# Patient Record
Sex: MALE | Race: BLACK OR AFRICAN AMERICAN | NOT HISPANIC OR LATINO | Employment: OTHER | URBAN - NONMETROPOLITAN AREA
[De-identification: names, ages, dates, MRNs, and addresses within clinical notes are randomized per-mention and may not be internally consistent; named-entity substitution may affect disease eponyms.]

---

## 2018-02-17 ENCOUNTER — HOSPITAL ENCOUNTER (EMERGENCY)
Facility: HOSPITAL | Age: 68
Discharge: HOME | End: 2018-02-17
Attending: FAMILY MEDICINE | Admitting: FAMILY MEDICINE

## 2018-02-17 ENCOUNTER — APPOINTMENT (OUTPATIENT)
Dept: NUCLEAR MEDICINE | Facility: HOSPITAL | Age: 68
End: 2018-02-17

## 2018-02-17 ENCOUNTER — APPOINTMENT (OUTPATIENT)
Dept: GENERAL RADIOLOGY | Facility: HOSPITAL | Age: 68
End: 2018-02-17

## 2018-02-17 VITALS
SYSTOLIC BLOOD PRESSURE: 148 MMHG | HEIGHT: 78 IN | RESPIRATION RATE: 16 BRPM | BODY MASS INDEX: 30.89 KG/M2 | TEMPERATURE: 97.5 F | OXYGEN SATURATION: 100 % | HEART RATE: 69 BPM | DIASTOLIC BLOOD PRESSURE: 94 MMHG | WEIGHT: 267 LBS

## 2018-02-17 DIAGNOSIS — E86.0 DEHYDRATION: ICD-10-CM

## 2018-02-17 DIAGNOSIS — R55 NEUROCARDIOGENIC SYNCOPE: Primary | ICD-10-CM

## 2018-02-17 LAB
ALBUMIN SERPL-MCNC: 4.5 G/DL (ref 3.4–4.8)
ALBUMIN/GLOB SERPL: 1.8 G/DL (ref 1.5–2.5)
ALP SERPL-CCNC: 73 U/L (ref 40–129)
ALT SERPL W P-5'-P-CCNC: 32 U/L (ref 10–44)
ANION GAP SERPL CALCULATED.3IONS-SCNC: 5.2 MMOL/L (ref 3.6–11.2)
AST SERPL-CCNC: 29 U/L (ref 10–34)
BASOPHILS # BLD AUTO: 0.01 10*3/MM3 (ref 0–0.3)
BASOPHILS NFR BLD AUTO: 0.1 % (ref 0–2)
BILIRUB SERPL-MCNC: 0.5 MG/DL (ref 0.2–1.8)
BNP SERPL-MCNC: 4 PG/ML (ref 0–100)
BUN BLD-MCNC: 26 MG/DL (ref 7–21)
BUN/CREAT SERPL: 19.8 (ref 7–25)
CALCIUM SPEC-SCNC: 9.2 MG/DL (ref 7.7–10)
CHLORIDE SERPL-SCNC: 107 MMOL/L (ref 99–112)
CO2 SERPL-SCNC: 26.8 MMOL/L (ref 24.3–31.9)
CREAT BLD-MCNC: 1.31 MG/DL (ref 0.43–1.29)
D DIMER PPP FEU-MCNC: 5.16 MCGFEU/ML (ref 0–0.5)
DEPRECATED RDW RBC AUTO: 40.5 FL (ref 37–54)
EOSINOPHIL # BLD AUTO: 0.07 10*3/MM3 (ref 0–0.7)
EOSINOPHIL NFR BLD AUTO: 1 % (ref 0–7)
ERYTHROCYTE [DISTWIDTH] IN BLOOD BY AUTOMATED COUNT: 12.8 % (ref 11.5–14.5)
GFR SERPL CREATININE-BSD FRML MDRD: 66 ML/MIN/1.73
GLOBULIN UR ELPH-MCNC: 2.5 GM/DL
GLUCOSE BLD-MCNC: 145 MG/DL (ref 70–110)
GLUCOSE BLDC GLUCOMTR-MCNC: 139 MG/DL (ref 70–130)
HCT VFR BLD AUTO: 38.8 % (ref 42–52)
HGB BLD-MCNC: 12.8 G/DL (ref 14–18)
IMM GRANULOCYTES # BLD: 0.01 10*3/MM3 (ref 0–0.03)
IMM GRANULOCYTES NFR BLD: 0.1 % (ref 0–0.5)
LYMPHOCYTES # BLD AUTO: 0.78 10*3/MM3 (ref 1–3)
LYMPHOCYTES NFR BLD AUTO: 11.2 % (ref 16–46)
MAGNESIUM SERPL-MCNC: 2.3 MG/DL (ref 1.7–2.6)
MCH RBC QN AUTO: 29.3 PG (ref 27–33)
MCHC RBC AUTO-ENTMCNC: 33 G/DL (ref 33–37)
MCV RBC AUTO: 88.8 FL (ref 80–94)
MONOCYTES # BLD AUTO: 0.6 10*3/MM3 (ref 0.1–0.9)
MONOCYTES NFR BLD AUTO: 8.6 % (ref 0–12)
NEUTROPHILS # BLD AUTO: 5.5 10*3/MM3 (ref 1.4–6.5)
NEUTROPHILS NFR BLD AUTO: 79 % (ref 40–75)
OSMOLALITY SERPL CALC.SUM OF ELEC: 284.9 MOSM/KG (ref 273–305)
PHOSPHATE SERPL-MCNC: 3.1 MG/DL (ref 2.7–4.5)
PLATELET # BLD AUTO: 253 10*3/MM3 (ref 130–400)
PMV BLD AUTO: 9.5 FL (ref 6–10)
POTASSIUM BLD-SCNC: 3.7 MMOL/L (ref 3.5–5.3)
PROT SERPL-MCNC: 7 G/DL (ref 6–8)
RBC # BLD AUTO: 4.37 10*6/MM3 (ref 4.7–6.1)
SODIUM BLD-SCNC: 139 MMOL/L (ref 135–153)
TROPONIN I SERPL-MCNC: <0.006 NG/ML
TSH SERPL DL<=0.05 MIU/L-ACNC: 0.49 MIU/ML (ref 0.55–4.78)
WBC NRBC COR # BLD: 6.97 10*3/MM3 (ref 4.5–12.5)

## 2018-02-17 PROCEDURE — 71045 X-RAY EXAM CHEST 1 VIEW: CPT

## 2018-02-17 PROCEDURE — 85025 COMPLETE CBC W/AUTO DIFF WBC: CPT | Performed by: FAMILY MEDICINE

## 2018-02-17 PROCEDURE — 78582 LUNG VENTILAT&PERFUS IMAGING: CPT | Performed by: RADIOLOGY

## 2018-02-17 PROCEDURE — 83735 ASSAY OF MAGNESIUM: CPT | Performed by: FAMILY MEDICINE

## 2018-02-17 PROCEDURE — 78582 LUNG VENTILAT&PERFUS IMAGING: CPT

## 2018-02-17 PROCEDURE — 83880 ASSAY OF NATRIURETIC PEPTIDE: CPT | Performed by: FAMILY MEDICINE

## 2018-02-17 PROCEDURE — 80053 COMPREHEN METABOLIC PANEL: CPT | Performed by: FAMILY MEDICINE

## 2018-02-17 PROCEDURE — 84443 ASSAY THYROID STIM HORMONE: CPT | Performed by: FAMILY MEDICINE

## 2018-02-17 PROCEDURE — 36415 COLL VENOUS BLD VENIPUNCTURE: CPT

## 2018-02-17 PROCEDURE — A9539 TC99M PENTETATE: HCPCS | Performed by: FAMILY MEDICINE

## 2018-02-17 PROCEDURE — 85379 FIBRIN DEGRADATION QUANT: CPT | Performed by: FAMILY MEDICINE

## 2018-02-17 PROCEDURE — 71045 X-RAY EXAM CHEST 1 VIEW: CPT | Performed by: RADIOLOGY

## 2018-02-17 PROCEDURE — 0 TECHNETIUM ALBUMIN AGGREGATED: Performed by: FAMILY MEDICINE

## 2018-02-17 PROCEDURE — 93005 ELECTROCARDIOGRAM TRACING: CPT | Performed by: FAMILY MEDICINE

## 2018-02-17 PROCEDURE — 99285 EMERGENCY DEPT VISIT HI MDM: CPT

## 2018-02-17 PROCEDURE — 0 TECHNETIUM TC 99M PENTETATE KIT: Performed by: FAMILY MEDICINE

## 2018-02-17 PROCEDURE — 84484 ASSAY OF TROPONIN QUANT: CPT | Performed by: FAMILY MEDICINE

## 2018-02-17 PROCEDURE — A9540 TC99M MAA: HCPCS | Performed by: FAMILY MEDICINE

## 2018-02-17 PROCEDURE — 82962 GLUCOSE BLOOD TEST: CPT

## 2018-02-17 PROCEDURE — 84100 ASSAY OF PHOSPHORUS: CPT | Performed by: FAMILY MEDICINE

## 2018-02-17 PROCEDURE — 96374 THER/PROPH/DIAG INJ IV PUSH: CPT

## 2018-02-17 PROCEDURE — 25010000002 ONDANSETRON PER 1 MG: Performed by: FAMILY MEDICINE

## 2018-02-17 PROCEDURE — 96361 HYDRATE IV INFUSION ADD-ON: CPT

## 2018-02-17 PROCEDURE — 96375 TX/PRO/DX INJ NEW DRUG ADDON: CPT

## 2018-02-17 PROCEDURE — 93010 ELECTROCARDIOGRAM REPORT: CPT | Performed by: INTERNAL MEDICINE

## 2018-02-17 RX ORDER — FAMOTIDINE 10 MG/ML
20 INJECTION, SOLUTION INTRAVENOUS ONCE
Status: COMPLETED | OUTPATIENT
Start: 2018-02-17 | End: 2018-02-17

## 2018-02-17 RX ORDER — SODIUM CHLORIDE 0.9 % (FLUSH) 0.9 %
10 SYRINGE (ML) INJECTION AS NEEDED
Status: DISCONTINUED | OUTPATIENT
Start: 2018-02-17 | End: 2018-02-17 | Stop reason: HOSPADM

## 2018-02-17 RX ORDER — OMEPRAZOLE 20 MG/1
20 CAPSULE, DELAYED RELEASE ORAL DAILY
COMMUNITY
End: 2021-08-30

## 2018-02-17 RX ORDER — ONDANSETRON 2 MG/ML
4 INJECTION INTRAMUSCULAR; INTRAVENOUS ONCE
Status: COMPLETED | OUTPATIENT
Start: 2018-02-17 | End: 2018-02-17

## 2018-02-17 RX ORDER — AMLODIPINE, VALSARTAN AND HYDROCHLOROTHIAZIDE 5; 160; 25 MG/1; MG/1; MG/1
TABLET ORAL DAILY
COMMUNITY
End: 2021-08-30 | Stop reason: SDUPTHER

## 2018-02-17 RX ORDER — IBUPROFEN 600 MG/1
600 TABLET ORAL EVERY 6 HOURS PRN
COMMUNITY
End: 2021-08-30

## 2018-02-17 RX ADMIN — FAMOTIDINE 20 MG: 10 INJECTION INTRAVENOUS at 16:23

## 2018-02-17 RX ADMIN — SODIUM CHLORIDE 1000 ML: 9 INJECTION, SOLUTION INTRAVENOUS at 17:14

## 2018-02-17 RX ADMIN — Medication 1 DOSE: at 18:10

## 2018-02-17 RX ADMIN — SODIUM CHLORIDE 2000 ML: 9 INJECTION, SOLUTION INTRAVENOUS at 16:23

## 2018-02-17 RX ADMIN — ONDANSETRON 4 MG: 2 INJECTION, SOLUTION INTRAMUSCULAR; INTRAVENOUS at 16:23

## 2018-02-17 RX ADMIN — KIT FOR THE PREPARATION OF TECHNETIUM TC 99M PENTETATE 1 DOSE: 20 INJECTION, POWDER, LYOPHILIZED, FOR SOLUTION INTRAVENOUS; RESPIRATORY (INHALATION) at 17:43

## 2018-02-17 NOTE — ED NOTES
Went to get pts glucose check. Registration is in there at this time. Will Check back.      Jane Marie  02/17/18 1540

## 2018-02-17 NOTE — ED PROVIDER NOTES
Subjective   History of Present Illness  68 y/o M here after syncopal episode that occurred shortly PTP. Pt states that he was attending a banquet when he suddenly felt nauseous and awoke after the syncopal episode. Pt states he had been standing for >20 minutes taking pictures when he suddenly felt nauseated and lightheaded. Pt states that he sat down upon feeling symptoms and almost immediately had syncopal episode. Pt does not remember syncope per se but states he sat down and the next thing he remembers people were standing over him when he awoke. Pt denies any CP, SOA, numbness/tingling, or palpitations surrounding event. Pt has never had an episode like this before. Pt has no history or arrhythmia or CHF and states he was last seen at his PCP <1 wk prior to presentation. Pt mentions that he is from out of town and has had a prolonged car ride.  Review of Systems   Constitutional: Negative for chills, fatigue and fever.   Eyes: Negative for photophobia and visual disturbance.   Respiratory: Negative for cough, chest tightness, shortness of breath and wheezing.    Cardiovascular: Negative for chest pain, palpitations and leg swelling.   Gastrointestinal: Positive for nausea. Negative for abdominal distention, abdominal pain, constipation, diarrhea and vomiting.   Genitourinary: Negative for difficulty urinating and dysuria.   Musculoskeletal: Negative for back pain and neck pain.   Skin: Negative for color change and pallor.   Neurological: Positive for syncope. Negative for dizziness, seizures, weakness, light-headedness and numbness.   Hematological: Does not bruise/bleed easily.   All other systems reviewed and are negative.      Past Medical History:   Diagnosis Date   • Acid reflux    • Diabetes    • High cholesterol    • Hypertension    • Neuropathy        No Known Allergies    Past Surgical History:   Procedure Laterality Date   • CIRCUMCISION     • KNEE ACL RECONSTRUCTION      left       History  reviewed. No pertinent family history.    Social History     Social History   • Marital status:      Spouse name: N/A   • Number of children: N/A   • Years of education: N/A     Social History Main Topics   • Smoking status: Never Smoker   • Smokeless tobacco: Never Used   • Alcohol use No   • Drug use: No   • Sexual activity: Defer     Other Topics Concern   • None     Social History Narrative   • None           Objective   Physical Exam   Constitutional: He is oriented to person, place, and time. He appears well-developed and well-nourished. He is active.   HENT:   Head: Normocephalic and atraumatic.   Right Ear: Hearing, external ear and ear canal normal.   Left Ear: Hearing, external ear and ear canal normal.   Nose: Nose normal.   Mouth/Throat: Uvula is midline, oropharynx is clear and moist and mucous membranes are normal.   Eyes: Conjunctivae, EOM and lids are normal. Pupils are equal, round, and reactive to light.   Neck: Trachea normal, normal range of motion, full passive range of motion without pain and phonation normal. Neck supple.   Cardiovascular: Normal rate, regular rhythm and normal heart sounds.    Pulmonary/Chest: Effort normal and breath sounds normal.   Abdominal: Normal appearance.   Neurological: He is alert and oriented to person, place, and time. GCS eye subscore is 4. GCS verbal subscore is 5. GCS motor subscore is 6.   Skin: Skin is warm, dry and intact.   Psychiatric: He has a normal mood and affect. His speech is normal and behavior is normal. Cognition and memory are normal.   Nursing note and vitals reviewed.      Procedures         ED Course  ED Course   Value Comment By Time   ECG 12 Lead NSR, 64 bpm. QTc 392ms. No ST segment abnormalities concerning for STEMI. No blocks or dysrhythmias. Naveed Grimaldo MD 02/17 1528   [place] Syncope Score of 0 on presentation placing pt in the low risk category. Pt's WELL's score for PE is zero on presentation but pt has PERC  of 1 necessitating a d-dimer since PE cannot be ruled out.  1907-pt V/Q scan read as low probability for PE. Pt with evidence of dehydration on labs with elevation in his BUN/Cr. Pt given IVF bolus. Pt states that he is feeling much improved. Pt encouraged to maintain PO intake. Given pt has such frequent PCP f/u will continue medications as is for now with plan to revisit his PCP next week. Pt has been asymptomatic while in the ED. I spoke w/ Dr Jeffries who removed pt's EKG and concurred with outpt f/u.             MDM  Number of Diagnoses or Management Options  Dehydration: new and requires workup  Neurocardiogenic syncope: new and requires workup     Amount and/or Complexity of Data Reviewed  Clinical lab tests: reviewed and ordered  Tests in the radiology section of CPT®: reviewed and ordered  Tests in the medicine section of CPT®: reviewed and ordered  Independent visualization of images, tracings, or specimens: yes    Risk of Complications, Morbidity, and/or Mortality  Presenting problems: high  Diagnostic procedures: high  Management options: high    Patient Progress  Patient progress: stable      Final diagnoses:   Neurocardiogenic syncope   Dehydration            Naveed Grimaldo MD  02/17/18 1915       Naveed Grimaldo MD  02/17/18 1921

## 2018-02-18 NOTE — ED NOTES
PT IS AAO X4 PT HAS NO SIGNS OF DISTRESS AT THIS TIME BREATHING IS EQUAL AND UNLABORED SKIN PWD     Kortney Laughlin, ZHANG  02/17/18 1939

## 2019-02-20 ENCOUNTER — HOSPITAL ENCOUNTER (OUTPATIENT)
Dept: OTHER | Facility: HOSPITAL | Age: 69
Discharge: HOME | End: 2019-02-20
Attending: INTERNAL MEDICINE

## 2019-02-20 LAB
ANION GAP SERPL CALC-SCNC: 15 MMOL/L (ref 8–19)
BUN SERPL-MCNC: 18 MG/DL (ref 5–25)
BUN/CREAT SERPL: 15 {RATIO} (ref 6–20)
CALCIUM SERPL-MCNC: 9.3 MG/DL (ref 8.7–10.4)
CHLORIDE SERPL-SCNC: 103 MMOL/L (ref 99–111)
CONV CO2: 27 MMOL/L (ref 22–32)
CREAT UR-MCNC: 1.22 MG/DL (ref 0.7–1.2)
GFR SERPLBLD BASED ON 1.73 SQ M-ARVRAT: >60 ML/MIN/{1.73_M2}
GLUCOSE SERPL-MCNC: 134 MG/DL (ref 70–99)
MAGNESIUM SERPL-MCNC: 2.32 MG/DL (ref 1.6–2.3)
OSMOLALITY SERPL CALC.SUM OF ELEC: 294 MOSM/KG (ref 273–304)
POTASSIUM SERPL-SCNC: 4.5 MMOL/L (ref 3.5–5.3)
SODIUM SERPL-SCNC: 140 MMOL/L (ref 135–147)

## 2019-03-08 ENCOUNTER — HOSPITAL ENCOUNTER (OUTPATIENT)
Dept: GENERAL RADIOLOGY | Facility: HOSPITAL | Age: 69
Discharge: HOME | End: 2019-03-08
Attending: PHYSICIAN ASSISTANT

## 2019-03-26 ENCOUNTER — HOSPITAL ENCOUNTER (OUTPATIENT)
Dept: URGENT CARE | Facility: CLINIC | Age: 69
Discharge: HOME | End: 2019-03-26

## 2019-03-26 LAB — GLUCOSE BLD-MCNC: 163 MG/DL (ref 70–99)

## 2019-04-01 ENCOUNTER — HOSPITAL ENCOUNTER (OUTPATIENT)
Dept: OTHER | Facility: HOSPITAL | Age: 69
Discharge: HOME | End: 2019-04-01
Attending: INTERNAL MEDICINE

## 2019-04-01 LAB
ALBUMIN SERPL-MCNC: 3.7 G/DL (ref 3.5–5)
ALBUMIN/GLOB SERPL: 1.1 {RATIO} (ref 1.4–2.6)
ALP SERPL-CCNC: 88 U/L (ref 56–155)
ALT SERPL-CCNC: 19 U/L (ref 10–40)
ANION GAP SERPL CALC-SCNC: 16 MMOL/L (ref 8–19)
AST SERPL-CCNC: 17 U/L (ref 15–50)
BASOPHILS # BLD AUTO: 0.02 10*3/UL (ref 0–0.2)
BASOPHILS NFR BLD AUTO: 0.3 % (ref 0–3)
BILIRUB SERPL-MCNC: 0.24 MG/DL (ref 0.2–1.3)
BUN SERPL-MCNC: 17 MG/DL (ref 5–25)
BUN/CREAT SERPL: 16 {RATIO} (ref 6–20)
CALCIUM SERPL-MCNC: 9.7 MG/DL (ref 8.7–10.4)
CHLORIDE SERPL-SCNC: 100 MMOL/L (ref 99–111)
CHOLEST SERPL-MCNC: 161 MG/DL (ref 107–200)
CHOLEST/HDLC SERPL: 3 {RATIO} (ref 3–6)
CONV ABS IMM GRAN: 0.02 10*3/UL (ref 0–0.2)
CONV CO2: 28 MMOL/L (ref 22–32)
CONV IMMATURE GRAN: 0.3 % (ref 0–1.8)
CONV TOTAL PROTEIN: 7.1 G/DL (ref 6.3–8.2)
CREAT UR-MCNC: 1.05 MG/DL (ref 0.7–1.2)
DEPRECATED RDW RBC AUTO: 40.3 FL (ref 35.1–43.9)
EOSINOPHIL # BLD AUTO: 0.25 10*3/UL (ref 0–0.7)
EOSINOPHIL # BLD AUTO: 3.2 % (ref 0–7)
ERYTHROCYTE [DISTWIDTH] IN BLOOD BY AUTOMATED COUNT: 12.3 % (ref 11.6–14.4)
EST. AVERAGE GLUCOSE BLD GHB EST-MCNC: 160 MG/DL
GFR SERPLBLD BASED ON 1.73 SQ M-ARVRAT: >60 ML/MIN/{1.73_M2}
GLOBULIN UR ELPH-MCNC: 3.4 G/DL (ref 2–3.5)
GLUCOSE SERPL-MCNC: 173 MG/DL (ref 70–99)
HBA1C MFR BLD: 12.3 G/DL (ref 14–18)
HBA1C MFR BLD: 7.2 % (ref 3.5–5.7)
HCT VFR BLD AUTO: 38.6 % (ref 42–52)
HDLC SERPL-MCNC: 53 MG/DL (ref 40–60)
LDLC SERPL CALC-MCNC: 96 MG/DL (ref 70–100)
LYMPHOCYTES # BLD AUTO: 1.75 10*3/UL (ref 1–5)
MCH RBC QN AUTO: 28.2 PG (ref 27–31)
MCHC RBC AUTO-ENTMCNC: 31.9 G/DL (ref 33–37)
MCV RBC AUTO: 88.5 FL (ref 80–96)
MONOCYTES # BLD AUTO: 0.76 10*3/UL (ref 0.2–1.2)
MONOCYTES NFR BLD AUTO: 9.6 % (ref 3–10)
NEUTROPHILS # BLD AUTO: 5.11 10*3/UL (ref 2–8)
NEUTROPHILS NFR BLD AUTO: 64.5 % (ref 30–85)
NRBC CBCN: 0 % (ref 0–0.7)
OSMOLALITY SERPL CALC.SUM OF ELEC: 294 MOSM/KG (ref 273–304)
PLATELET # BLD AUTO: 383 10*3/UL (ref 130–400)
PMV BLD AUTO: 9.6 FL (ref 9.4–12.4)
POTASSIUM SERPL-SCNC: 4.7 MMOL/L (ref 3.5–5.3)
RBC # BLD AUTO: 4.36 10*6/UL (ref 4.7–6.1)
SODIUM SERPL-SCNC: 139 MMOL/L (ref 135–147)
T4 FREE SERPL-MCNC: 1.2 NG/DL (ref 0.9–1.8)
TRIGL SERPL-MCNC: 61 MG/DL (ref 40–150)
TSH SERPL-ACNC: 2.36 M[IU]/L (ref 0.27–4.2)
VARIANT LYMPHS NFR BLD MANUAL: 22.1 % (ref 20–45)
VLDLC SERPL-MCNC: 12 MG/DL (ref 5–37)
WBC # BLD AUTO: 7.91 10*3/UL (ref 4.8–10.8)

## 2019-05-08 ENCOUNTER — HOSPITAL ENCOUNTER (OUTPATIENT)
Dept: OTHER | Facility: HOSPITAL | Age: 69
Discharge: HOME | End: 2019-05-08
Attending: INTERNAL MEDICINE

## 2019-05-08 LAB
BASOPHILS # BLD AUTO: 0.02 10*3/UL (ref 0–0.2)
BASOPHILS NFR BLD AUTO: 0.2 % (ref 0–3)
CONV ABS IMM GRAN: 0.02 10*3/UL (ref 0–0.2)
CONV IMMATURE GRAN: 0.2 % (ref 0–1.8)
CRP SERPL-MCNC: 97.3 MG/L (ref 0–5)
DEPRECATED RDW RBC AUTO: 42.8 FL (ref 35.1–43.9)
EOSINOPHIL # BLD AUTO: 0.24 10*3/UL (ref 0–0.7)
EOSINOPHIL # BLD AUTO: 2.7 % (ref 0–7)
ERYTHROCYTE [DISTWIDTH] IN BLOOD BY AUTOMATED COUNT: 13.2 % (ref 11.6–14.4)
ERYTHROCYTE [SEDIMENTATION RATE] IN BLOOD: 86 MM/H (ref 0–20)
HBA1C MFR BLD: 10.4 G/DL (ref 14–18)
HCT VFR BLD AUTO: 32.5 % (ref 42–52)
LYMPHOCYTES # BLD AUTO: 1.87 10*3/UL (ref 1–5)
MCH RBC QN AUTO: 28.2 PG (ref 27–31)
MCHC RBC AUTO-ENTMCNC: 32 G/DL (ref 33–37)
MCV RBC AUTO: 88.1 FL (ref 80–96)
MONOCYTES # BLD AUTO: 0.84 10*3/UL (ref 0.2–1.2)
MONOCYTES NFR BLD AUTO: 9.5 % (ref 3–10)
NEUTROPHILS # BLD AUTO: 5.85 10*3/UL (ref 2–8)
NEUTROPHILS NFR BLD AUTO: 66.2 % (ref 30–85)
NRBC CBCN: 0 % (ref 0–0.7)
PLATELET # BLD AUTO: 347 10*3/UL (ref 130–400)
PMV BLD AUTO: 9.4 FL (ref 9.4–12.4)
RBC # BLD AUTO: 3.69 10*6/UL (ref 4.7–6.1)
URATE SERPL-MCNC: 4 MG/DL (ref 3.5–8.5)
VARIANT LYMPHS NFR BLD MANUAL: 21.2 % (ref 20–45)
WBC # BLD AUTO: 8.84 10*3/UL (ref 4.8–10.8)

## 2019-06-21 ENCOUNTER — HOSPITAL ENCOUNTER (OUTPATIENT)
Dept: OTHER | Facility: HOSPITAL | Age: 69
Discharge: HOME | End: 2019-06-21
Attending: INTERNAL MEDICINE

## 2019-06-21 LAB
ANION GAP SERPL CALC-SCNC: 22 MMOL/L (ref 8–19)
BASOPHILS # BLD AUTO: 0.01 10*3/UL (ref 0–0.2)
BASOPHILS NFR BLD AUTO: 0.1 % (ref 0–3)
BUN SERPL-MCNC: 19 MG/DL (ref 5–25)
BUN/CREAT SERPL: 18 {RATIO} (ref 6–20)
CALCIUM SERPL-MCNC: 9.8 MG/DL (ref 8.7–10.4)
CHLORIDE SERPL-SCNC: 101 MMOL/L (ref 99–111)
CONV ABS IMM GRAN: 0.03 10*3/UL (ref 0–0.2)
CONV CO2: 23 MMOL/L (ref 22–32)
CONV IMMATURE GRAN: 0.3 % (ref 0–1.8)
CREAT UR-MCNC: 1.05 MG/DL (ref 0.7–1.2)
DEPRECATED RDW RBC AUTO: 48.2 FL (ref 35.1–43.9)
EOSINOPHIL # BLD AUTO: 0.06 10*3/UL (ref 0–0.7)
EOSINOPHIL # BLD AUTO: 0.7 % (ref 0–7)
ERYTHROCYTE [DISTWIDTH] IN BLOOD BY AUTOMATED COUNT: 14.7 % (ref 11.6–14.4)
EST. AVERAGE GLUCOSE BLD GHB EST-MCNC: 154 MG/DL
FERRITIN SERPL-MCNC: 306 NG/ML (ref 30–300)
FOLATE SERPL-MCNC: >20 NG/ML (ref 4.8–20)
GFR SERPLBLD BASED ON 1.73 SQ M-ARVRAT: >60 ML/MIN/{1.73_M2}
GLUCOSE SERPL-MCNC: 180 MG/DL (ref 70–99)
HBA1C MFR BLD: 11.3 G/DL (ref 14–18)
HBA1C MFR BLD: 7 % (ref 3.5–5.7)
HCT VFR BLD AUTO: 35.6 % (ref 42–52)
IRON SATN MFR SERPL: 17 % (ref 20–55)
IRON SERPL-MCNC: 52 UG/DL (ref 70–180)
LYMPHOCYTES # BLD AUTO: 1.21 10*3/UL (ref 1–5)
MCH RBC QN AUTO: 28.4 PG (ref 27–31)
MCHC RBC AUTO-ENTMCNC: 31.7 G/DL (ref 33–37)
MCV RBC AUTO: 89.4 FL (ref 80–96)
MONOCYTES # BLD AUTO: 0.52 10*3/UL (ref 0.2–1.2)
MONOCYTES NFR BLD AUTO: 5.8 % (ref 3–10)
NEUTROPHILS # BLD AUTO: 7.12 10*3/UL (ref 2–8)
NEUTROPHILS NFR BLD AUTO: 79.6 % (ref 30–85)
NRBC CBCN: 0 % (ref 0–0.7)
OSMOLALITY SERPL CALC.SUM OF ELEC: 299 MOSM/KG (ref 273–304)
PLATELET # BLD AUTO: 357 10*3/UL (ref 130–400)
PMV BLD AUTO: 10 FL (ref 9.4–12.4)
POTASSIUM SERPL-SCNC: 4.6 MMOL/L (ref 3.5–5.3)
RBC # BLD AUTO: 3.98 10*6/UL (ref 4.7–6.1)
SODIUM SERPL-SCNC: 141 MMOL/L (ref 135–147)
TIBC SERPL-MCNC: 315 UG/DL (ref 245–450)
TRANSFERRIN SERPL-MCNC: 220 MG/DL (ref 215–365)
VARIANT LYMPHS NFR BLD MANUAL: 13.5 % (ref 20–45)
VIT B12 SERPL-MCNC: 948 PG/ML (ref 211–911)
WBC # BLD AUTO: 8.95 10*3/UL (ref 4.8–10.8)

## 2019-09-24 ENCOUNTER — HOSPITAL ENCOUNTER (OUTPATIENT)
Dept: OTHER | Facility: HOSPITAL | Age: 69
Discharge: HOME | End: 2019-09-24
Attending: INTERNAL MEDICINE

## 2019-09-24 LAB
ALBUMIN SERPL-MCNC: 4.4 G/DL (ref 3.5–5)
ALBUMIN/GLOB SERPL: 1.8 {RATIO} (ref 1.4–2.6)
ALP SERPL-CCNC: 74 U/L (ref 56–155)
ALT SERPL-CCNC: 20 U/L (ref 10–40)
ANION GAP SERPL CALC-SCNC: 13 MMOL/L (ref 8–19)
AST SERPL-CCNC: 21 U/L (ref 15–50)
BASOPHILS # BLD AUTO: 0.01 10*3/UL (ref 0–0.2)
BASOPHILS NFR BLD AUTO: 0.1 % (ref 0–3)
BILIRUB SERPL-MCNC: 0.21 MG/DL (ref 0.2–1.3)
BUN SERPL-MCNC: 18 MG/DL (ref 5–25)
BUN/CREAT SERPL: 16 {RATIO} (ref 6–20)
CALCIUM SERPL-MCNC: 9.7 MG/DL (ref 8.7–10.4)
CHLORIDE SERPL-SCNC: 106 MMOL/L (ref 99–111)
CHOLEST SERPL-MCNC: 137 MG/DL (ref 107–200)
CHOLEST/HDLC SERPL: 2.4 {RATIO} (ref 3–6)
CONV ABS IMM GRAN: 0.02 10*3/UL (ref 0–0.2)
CONV CO2: 25 MMOL/L (ref 22–32)
CONV IMMATURE GRAN: 0.3 % (ref 0–1.8)
CONV RHEUMATOID FACTOR IGM: 14.6 [IU]/ML (ref 0–14)
CONV TOTAL PROTEIN: 6.8 G/DL (ref 6.3–8.2)
CREAT UR-MCNC: 1.1 MG/DL (ref 0.7–1.2)
CRP SERPL HS-MCNC: 0.19 MG/DL (ref 0–0.5)
DEPRECATED RDW RBC AUTO: 42.4 FL (ref 35.1–43.9)
EOSINOPHIL # BLD AUTO: 0.2 10*3/UL (ref 0–0.7)
EOSINOPHIL # BLD AUTO: 2.9 % (ref 0–7)
ERYTHROCYTE [DISTWIDTH] IN BLOOD BY AUTOMATED COUNT: 13.2 % (ref 11.6–14.4)
ERYTHROCYTE [SEDIMENTATION RATE] IN BLOOD: 9 MM/H (ref 0–20)
EST. AVERAGE GLUCOSE BLD GHB EST-MCNC: 166 MG/DL
FERRITIN SERPL-MCNC: 129 NG/ML (ref 30–300)
GFR SERPLBLD BASED ON 1.73 SQ M-ARVRAT: >60 ML/MIN/{1.73_M2}
GLOBULIN UR ELPH-MCNC: 2.4 G/DL (ref 2–3.5)
GLUCOSE SERPL-MCNC: 133 MG/DL (ref 70–99)
HBA1C MFR BLD: 7.4 % (ref 3.5–5.7)
HCT VFR BLD AUTO: 37.4 % (ref 42–52)
HDLC SERPL-MCNC: 57 MG/DL (ref 40–60)
HGB BLD-MCNC: 12 G/DL (ref 14–18)
IRON SATN MFR SERPL: 16 % (ref 20–55)
IRON SERPL-MCNC: 51 UG/DL (ref 70–180)
LDLC SERPL CALC-MCNC: 65 MG/DL (ref 70–100)
LYMPHOCYTES # BLD AUTO: 1.48 10*3/UL (ref 1–5)
LYMPHOCYTES NFR BLD AUTO: 21.6 % (ref 20–45)
MCH RBC QN AUTO: 28.5 PG (ref 27–31)
MCHC RBC AUTO-ENTMCNC: 32.1 G/DL (ref 33–37)
MCV RBC AUTO: 88.8 FL (ref 80–96)
MONOCYTES # BLD AUTO: 0.61 10*3/UL (ref 0.2–1.2)
MONOCYTES NFR BLD AUTO: 8.9 % (ref 3–10)
NEUTROPHILS # BLD AUTO: 4.52 10*3/UL (ref 2–8)
NEUTROPHILS NFR BLD AUTO: 66.2 % (ref 30–85)
NRBC CBCN: 0 % (ref 0–0.7)
OSMOLALITY SERPL CALC.SUM OF ELEC: 292 MOSM/KG (ref 273–304)
PLATELET # BLD AUTO: 275 10*3/UL (ref 130–400)
PMV BLD AUTO: 9.7 FL (ref 9.4–12.4)
POTASSIUM SERPL-SCNC: 4.5 MMOL/L (ref 3.5–5.3)
PSA SERPL-MCNC: 1.81 NG/ML (ref 0–4)
RBC # BLD AUTO: 4.21 10*6/UL (ref 4.7–6.1)
SODIUM SERPL-SCNC: 139 MMOL/L (ref 135–147)
TIBC SERPL-MCNC: 313 UG/DL (ref 245–450)
TRANSFERRIN SERPL-MCNC: 219 MG/DL (ref 215–365)
TRIGL SERPL-MCNC: 75 MG/DL (ref 40–150)
VLDLC SERPL-MCNC: 15 MG/DL (ref 5–37)
WBC # BLD AUTO: 6.84 10*3/UL (ref 4.8–10.8)

## 2019-12-26 ENCOUNTER — HOSPITAL ENCOUNTER (OUTPATIENT)
Dept: OTHER | Facility: HOSPITAL | Age: 69
Discharge: HOME | End: 2019-12-26
Attending: INTERNAL MEDICINE

## 2019-12-26 LAB
ANION GAP SERPL CALC-SCNC: 17 MMOL/L (ref 8–19)
BUN SERPL-MCNC: 19 MG/DL (ref 5–25)
BUN/CREAT SERPL: 18 {RATIO} (ref 6–20)
CALCIUM SERPL-MCNC: 9.6 MG/DL (ref 8.7–10.4)
CHLORIDE SERPL-SCNC: 104 MMOL/L (ref 99–111)
CONV CO2: 25 MMOL/L (ref 22–32)
CREAT UR-MCNC: 1.08 MG/DL (ref 0.7–1.2)
EST. AVERAGE GLUCOSE BLD GHB EST-MCNC: 151 MG/DL
GFR SERPLBLD BASED ON 1.73 SQ M-ARVRAT: >60 ML/MIN/{1.73_M2}
GLUCOSE SERPL-MCNC: 134 MG/DL (ref 70–99)
HBA1C MFR BLD: 6.9 % (ref 3.5–5.7)
OSMOLALITY SERPL CALC.SUM OF ELEC: 298 MOSM/KG (ref 273–304)
POTASSIUM SERPL-SCNC: 4 MMOL/L (ref 3.5–5.3)
SODIUM SERPL-SCNC: 142 MMOL/L (ref 135–147)

## 2020-04-29 ENCOUNTER — HOSPITAL ENCOUNTER (OUTPATIENT)
Dept: LAB | Facility: HOSPITAL | Age: 70
Discharge: HOME | End: 2020-04-29
Attending: INTERNAL MEDICINE

## 2020-04-29 LAB
ALBUMIN SERPL-MCNC: 4.3 G/DL (ref 3.5–5)
ALBUMIN/GLOB SERPL: 1.5 {RATIO} (ref 1.4–2.6)
ALP SERPL-CCNC: 75 U/L (ref 56–155)
ALT SERPL-CCNC: 16 U/L (ref 10–40)
ANION GAP SERPL CALC-SCNC: 23 MMOL/L (ref 8–19)
AST SERPL-CCNC: 21 U/L (ref 15–50)
BASOPHILS # BLD AUTO: 0.02 10*3/UL (ref 0–0.2)
BASOPHILS NFR BLD AUTO: 0.3 % (ref 0–3)
BILIRUB SERPL-MCNC: 0.42 MG/DL (ref 0.2–1.3)
BUN SERPL-MCNC: 19 MG/DL (ref 5–25)
BUN/CREAT SERPL: 14 {RATIO} (ref 6–20)
CALCIUM SERPL-MCNC: 9.8 MG/DL (ref 8.7–10.4)
CHLORIDE SERPL-SCNC: 106 MMOL/L (ref 99–111)
CHOLEST SERPL-MCNC: 153 MG/DL (ref 107–200)
CHOLEST/HDLC SERPL: 3 {RATIO} (ref 3–6)
CONV ABS IMM GRAN: 0.02 10*3/UL (ref 0–0.2)
CONV CO2: 21 MMOL/L (ref 22–32)
CONV IMMATURE GRAN: 0.3 % (ref 0–1.8)
CONV TOTAL PROTEIN: 7.2 G/DL (ref 6.3–8.2)
CREAT UR-MCNC: 1.39 MG/DL (ref 0.7–1.2)
DEPRECATED RDW RBC AUTO: 42.9 FL (ref 35.1–43.9)
EOSINOPHIL # BLD AUTO: 0.19 10*3/UL (ref 0–0.7)
EOSINOPHIL # BLD AUTO: 2.6 % (ref 0–7)
ERYTHROCYTE [DISTWIDTH] IN BLOOD BY AUTOMATED COUNT: 13.1 % (ref 11.6–14.4)
EST. AVERAGE GLUCOSE BLD GHB EST-MCNC: 169 MG/DL
GFR SERPLBLD BASED ON 1.73 SQ M-ARVRAT: 58 ML/MIN/{1.73_M2}
GLOBULIN UR ELPH-MCNC: 2.9 G/DL (ref 2–3.5)
GLUCOSE SERPL-MCNC: 140 MG/DL (ref 70–99)
HBA1C MFR BLD: 7.5 % (ref 3.5–5.7)
HCT VFR BLD AUTO: 38.5 % (ref 42–52)
HDLC SERPL-MCNC: 51 MG/DL (ref 40–60)
HGB BLD-MCNC: 12.3 G/DL (ref 14–18)
IRON SATN MFR SERPL: 20 % (ref 20–55)
IRON SERPL-MCNC: 67 UG/DL (ref 70–180)
LDLC SERPL CALC-MCNC: 85 MG/DL (ref 70–100)
LYMPHOCYTES # BLD AUTO: 1.52 10*3/UL (ref 1–5)
LYMPHOCYTES NFR BLD AUTO: 20.7 % (ref 20–45)
MCH RBC QN AUTO: 28.4 PG (ref 27–31)
MCHC RBC AUTO-ENTMCNC: 31.9 G/DL (ref 33–37)
MCV RBC AUTO: 88.9 FL (ref 80–96)
MONOCYTES # BLD AUTO: 0.67 10*3/UL (ref 0.2–1.2)
MONOCYTES NFR BLD AUTO: 9.1 % (ref 3–10)
NEUTROPHILS # BLD AUTO: 4.91 10*3/UL (ref 2–8)
NEUTROPHILS NFR BLD AUTO: 67 % (ref 30–85)
NRBC CBCN: 0 % (ref 0–0.7)
OSMOLALITY SERPL CALC.SUM OF ELEC: 307 MOSM/KG (ref 273–304)
PLATELET # BLD AUTO: 280 10*3/UL (ref 130–400)
PMV BLD AUTO: 10 FL (ref 9.4–12.4)
POTASSIUM SERPL-SCNC: 4.4 MMOL/L (ref 3.5–5.3)
RBC # BLD AUTO: 4.33 10*6/UL (ref 4.7–6.1)
SODIUM SERPL-SCNC: 146 MMOL/L (ref 135–147)
TIBC SERPL-MCNC: 330 UG/DL (ref 245–450)
TRANSFERRIN SERPL-MCNC: 231 MG/DL (ref 215–365)
TRIGL SERPL-MCNC: 84 MG/DL (ref 40–150)
VLDLC SERPL-MCNC: 17 MG/DL (ref 5–37)
WBC # BLD AUTO: 7.33 10*3/UL (ref 4.8–10.8)

## 2020-08-03 ENCOUNTER — HOSPITAL ENCOUNTER (OUTPATIENT)
Dept: LAB | Facility: HOSPITAL | Age: 70
Discharge: HOME | End: 2020-08-03
Attending: INTERNAL MEDICINE

## 2020-08-03 LAB
ANION GAP SERPL CALC-SCNC: 18 MMOL/L (ref 8–19)
BASOPHILS # BLD AUTO: 0.02 10*3/UL (ref 0–0.2)
BASOPHILS NFR BLD AUTO: 0.3 % (ref 0–3)
BUN SERPL-MCNC: 16 MG/DL (ref 5–25)
BUN/CREAT SERPL: 13 {RATIO} (ref 6–20)
CALCIUM SERPL-MCNC: 10.2 MG/DL (ref 8.7–10.4)
CHLORIDE SERPL-SCNC: 104 MMOL/L (ref 99–111)
CONV ABS IMM GRAN: 0.01 10*3/UL (ref 0–0.2)
CONV CO2: 23 MMOL/L (ref 22–32)
CONV IMMATURE GRAN: 0.2 % (ref 0–1.8)
CREAT UR-MCNC: 1.24 MG/DL (ref 0.7–1.2)
DEPRECATED RDW RBC AUTO: 41.1 FL (ref 35.1–43.9)
EOSINOPHIL # BLD AUTO: 0.16 10*3/UL (ref 0–0.7)
EOSINOPHIL # BLD AUTO: 2.6 % (ref 0–7)
ERYTHROCYTE [DISTWIDTH] IN BLOOD BY AUTOMATED COUNT: 12.6 % (ref 11.6–14.4)
EST. AVERAGE GLUCOSE BLD GHB EST-MCNC: 151 MG/DL
FERRITIN SERPL-MCNC: 127 NG/ML (ref 30–300)
GFR SERPLBLD BASED ON 1.73 SQ M-ARVRAT: >60 ML/MIN/{1.73_M2}
GLUCOSE SERPL-MCNC: 152 MG/DL (ref 70–99)
HBA1C MFR BLD: 6.9 % (ref 3.5–5.7)
HCT VFR BLD AUTO: 38.5 % (ref 42–52)
HGB BLD-MCNC: 12.6 G/DL (ref 14–18)
IRON SATN MFR SERPL: 18 % (ref 20–55)
IRON SERPL-MCNC: 60 UG/DL (ref 70–180)
LYMPHOCYTES # BLD AUTO: 1.68 10*3/UL (ref 1–5)
LYMPHOCYTES NFR BLD AUTO: 27.4 % (ref 20–45)
MCH RBC QN AUTO: 29.2 PG (ref 27–31)
MCHC RBC AUTO-ENTMCNC: 32.7 G/DL (ref 33–37)
MCV RBC AUTO: 89.3 FL (ref 80–96)
MONOCYTES # BLD AUTO: 0.52 10*3/UL (ref 0.2–1.2)
MONOCYTES NFR BLD AUTO: 8.5 % (ref 3–10)
NEUTROPHILS # BLD AUTO: 3.74 10*3/UL (ref 2–8)
NEUTROPHILS NFR BLD AUTO: 61 % (ref 30–85)
NRBC CBCN: 0 % (ref 0–0.7)
OSMOLALITY SERPL CALC.SUM OF ELEC: 296 MOSM/KG (ref 273–304)
PLATELET # BLD AUTO: 261 10*3/UL (ref 130–400)
PMV BLD AUTO: 9.9 FL (ref 9.4–12.4)
POTASSIUM SERPL-SCNC: 4.2 MMOL/L (ref 3.5–5.3)
RBC # BLD AUTO: 4.31 10*6/UL (ref 4.7–6.1)
SODIUM SERPL-SCNC: 141 MMOL/L (ref 135–147)
TIBC SERPL-MCNC: 325 UG/DL (ref 245–450)
TRANSFERRIN SERPL-MCNC: 227 MG/DL (ref 215–365)
WBC # BLD AUTO: 6.13 10*3/UL (ref 4.8–10.8)

## 2020-12-03 ENCOUNTER — HOSPITAL ENCOUNTER (OUTPATIENT)
Dept: LAB | Facility: HOSPITAL | Age: 70
Discharge: HOME | End: 2020-12-03
Attending: INTERNAL MEDICINE

## 2020-12-03 LAB
ALBUMIN SERPL-MCNC: 4.1 G/DL (ref 3.5–5)
ALBUMIN/GLOB SERPL: 1.5 {RATIO} (ref 1.4–2.6)
ALP SERPL-CCNC: 81 U/L (ref 56–155)
ALT SERPL-CCNC: 14 U/L (ref 10–40)
ANION GAP SERPL CALC-SCNC: 15 MMOL/L (ref 8–19)
AST SERPL-CCNC: 19 U/L (ref 15–50)
BASOPHILS # BLD AUTO: 0.02 10*3/UL (ref 0–0.2)
BASOPHILS NFR BLD AUTO: 0.3 % (ref 0–3)
BILIRUB SERPL-MCNC: 0.33 MG/DL (ref 0.2–1.3)
BUN SERPL-MCNC: 21 MG/DL (ref 5–25)
BUN/CREAT SERPL: 19 {RATIO} (ref 6–20)
CALCIUM SERPL-MCNC: 9.9 MG/DL (ref 8.7–10.4)
CHLORIDE SERPL-SCNC: 104 MMOL/L (ref 99–111)
CHOLEST SERPL-MCNC: 147 MG/DL (ref 107–200)
CHOLEST/HDLC SERPL: 2.9 {RATIO} (ref 3–6)
CONV ABS IMM GRAN: 0.01 10*3/UL (ref 0–0.2)
CONV CO2: 27 MMOL/L (ref 22–32)
CONV IMMATURE GRAN: 0.2 % (ref 0–1.8)
CONV TOTAL PROTEIN: 6.9 G/DL (ref 6.3–8.2)
CREAT UR-MCNC: 1.12 MG/DL (ref 0.7–1.2)
DEPRECATED RDW RBC AUTO: 41.1 FL (ref 35.1–43.9)
EOSINOPHIL # BLD AUTO: 0.2 10*3/UL (ref 0–0.7)
EOSINOPHIL # BLD AUTO: 3 % (ref 0–7)
ERYTHROCYTE [DISTWIDTH] IN BLOOD BY AUTOMATED COUNT: 12.6 % (ref 11.6–14.4)
EST. AVERAGE GLUCOSE BLD GHB EST-MCNC: 151 MG/DL
FERRITIN SERPL-MCNC: 162 NG/ML (ref 30–300)
GFR SERPLBLD BASED ON 1.73 SQ M-ARVRAT: >60 ML/MIN/{1.73_M2}
GLOBULIN UR ELPH-MCNC: 2.8 G/DL (ref 2–3.5)
GLUCOSE SERPL-MCNC: 133 MG/DL (ref 70–99)
HBA1C MFR BLD: 6.9 % (ref 3.5–5.7)
HCT VFR BLD AUTO: 40.4 % (ref 42–52)
HDLC SERPL-MCNC: 50 MG/DL (ref 40–60)
HGB BLD-MCNC: 13.2 G/DL (ref 14–18)
IRON SATN MFR SERPL: 26 % (ref 20–55)
IRON SERPL-MCNC: 89 UG/DL (ref 70–180)
LDLC SERPL CALC-MCNC: 82 MG/DL (ref 70–100)
LYMPHOCYTES # BLD AUTO: 2.35 10*3/UL (ref 1–5)
LYMPHOCYTES NFR BLD AUTO: 35.8 % (ref 20–45)
MCH RBC QN AUTO: 29.1 PG (ref 27–31)
MCHC RBC AUTO-ENTMCNC: 32.7 G/DL (ref 33–37)
MCV RBC AUTO: 89.2 FL (ref 80–96)
MONOCYTES # BLD AUTO: 0.59 10*3/UL (ref 0.2–1.2)
MONOCYTES NFR BLD AUTO: 9 % (ref 3–10)
NEUTROPHILS # BLD AUTO: 3.39 10*3/UL (ref 2–8)
NEUTROPHILS NFR BLD AUTO: 51.7 % (ref 30–85)
NRBC CBCN: 0 % (ref 0–0.7)
OSMOLALITY SERPL CALC.SUM OF ELEC: 297 MOSM/KG (ref 273–304)
PLATELET # BLD AUTO: 287 10*3/UL (ref 130–400)
PMV BLD AUTO: 9.7 FL (ref 9.4–12.4)
POTASSIUM SERPL-SCNC: 4.6 MMOL/L (ref 3.5–5.3)
RBC # BLD AUTO: 4.53 10*6/UL (ref 4.7–6.1)
SODIUM SERPL-SCNC: 141 MMOL/L (ref 135–147)
TIBC SERPL-MCNC: 342 UG/DL (ref 245–450)
TRANSFERRIN SERPL-MCNC: 239 MG/DL (ref 215–365)
TRIGL SERPL-MCNC: 77 MG/DL (ref 40–150)
VLDLC SERPL-MCNC: 15 MG/DL (ref 5–37)
WBC # BLD AUTO: 6.56 10*3/UL (ref 4.8–10.8)

## 2021-04-02 ENCOUNTER — HOSPITAL ENCOUNTER (OUTPATIENT)
Dept: LAB | Facility: HOSPITAL | Age: 71
Discharge: HOME | End: 2021-04-02
Attending: INTERNAL MEDICINE

## 2021-04-02 LAB
ANION GAP SERPL CALC-SCNC: 17 MMOL/L (ref 8–19)
BASOPHILS # BLD AUTO: 0.03 10*3/UL (ref 0–0.2)
BASOPHILS NFR BLD AUTO: 0.5 % (ref 0–3)
BUN SERPL-MCNC: 15 MG/DL (ref 5–25)
BUN/CREAT SERPL: 13 {RATIO} (ref 6–20)
CALCIUM SERPL-MCNC: 9.7 MG/DL (ref 8.7–10.4)
CHLORIDE SERPL-SCNC: 106 MMOL/L (ref 99–111)
CONV ABS IMM GRAN: 0.01 10*3/UL (ref 0–0.2)
CONV CO2: 24 MMOL/L (ref 22–32)
CONV IMMATURE GRAN: 0.2 % (ref 0–1.8)
CREAT UR-MCNC: 1.16 MG/DL (ref 0.7–1.2)
DEPRECATED RDW RBC AUTO: 40.8 FL (ref 35.1–43.9)
EOSINOPHIL # BLD AUTO: 0.3 10*3/UL (ref 0–0.7)
EOSINOPHIL # BLD AUTO: 5 % (ref 0–7)
ERYTHROCYTE [DISTWIDTH] IN BLOOD BY AUTOMATED COUNT: 12.5 % (ref 11.6–14.4)
EST. AVERAGE GLUCOSE BLD GHB EST-MCNC: 157 MG/DL
GFR SERPLBLD BASED ON 1.73 SQ M-ARVRAT: >60 ML/MIN/{1.73_M2}
GLUCOSE SERPL-MCNC: 148 MG/DL (ref 70–99)
HBA1C MFR BLD: 7.1 % (ref 3.5–5.7)
HCT VFR BLD AUTO: 40 % (ref 42–52)
HGB BLD-MCNC: 13 G/DL (ref 14–18)
IRON SERPL-MCNC: 61 UG/DL (ref 70–180)
LYMPHOCYTES # BLD AUTO: 2.02 10*3/UL (ref 1–5)
LYMPHOCYTES NFR BLD AUTO: 33.4 % (ref 20–45)
MCH RBC QN AUTO: 29.1 PG (ref 27–31)
MCHC RBC AUTO-ENTMCNC: 32.5 G/DL (ref 33–37)
MCV RBC AUTO: 89.5 FL (ref 80–96)
MONOCYTES # BLD AUTO: 0.47 10*3/UL (ref 0.2–1.2)
MONOCYTES NFR BLD AUTO: 7.8 % (ref 3–10)
NEUTROPHILS # BLD AUTO: 3.21 10*3/UL (ref 2–8)
NEUTROPHILS NFR BLD AUTO: 53.1 % (ref 30–85)
NRBC CBCN: 0 % (ref 0–0.7)
OSMOLALITY SERPL CALC.SUM OF ELEC: 298 MOSM/KG (ref 273–304)
PLATELET # BLD AUTO: 293 10*3/UL (ref 130–400)
PMV BLD AUTO: 10 FL (ref 9.4–12.4)
POTASSIUM SERPL-SCNC: 5 MMOL/L (ref 3.5–5.3)
PSA SERPL-MCNC: 1.81 NG/ML (ref 0–4)
RBC # BLD AUTO: 4.47 10*6/UL (ref 4.7–6.1)
SODIUM SERPL-SCNC: 142 MMOL/L (ref 135–147)
T4 FREE SERPL-MCNC: 1 NG/DL (ref 0.9–1.8)
TSH SERPL-ACNC: 1.33 M[IU]/L (ref 0.27–4.2)
WBC # BLD AUTO: 6.04 10*3/UL (ref 4.8–10.8)

## 2021-07-02 ENCOUNTER — TRANSCRIBE ORDERS (OUTPATIENT)
Dept: ADMINISTRATIVE | Facility: HOSPITAL | Age: 71
End: 2021-07-02

## 2021-07-02 ENCOUNTER — HOSPITAL ENCOUNTER (OUTPATIENT)
Dept: GENERAL RADIOLOGY | Facility: HOSPITAL | Age: 71
Discharge: HOME | End: 2021-07-02
Admitting: INTERNAL MEDICINE

## 2021-07-02 DIAGNOSIS — M25.532 LEFT WRIST PAIN: ICD-10-CM

## 2021-07-02 DIAGNOSIS — M25.562 LEFT KNEE PAIN, UNSPECIFIED CHRONICITY: Primary | ICD-10-CM

## 2021-07-02 DIAGNOSIS — M25.562 LEFT KNEE PAIN, UNSPECIFIED CHRONICITY: ICD-10-CM

## 2021-07-02 PROCEDURE — 73110 X-RAY EXAM OF WRIST: CPT

## 2021-07-02 PROCEDURE — 73562 X-RAY EXAM OF KNEE 3: CPT

## 2021-07-29 PROBLEM — E78.2 MIXED HYPERLIPIDEMIA: Status: ACTIVE | Noted: 2017-02-09

## 2021-07-29 PROBLEM — E78.00 PURE HYPERCHOLESTEROLEMIA: Status: ACTIVE | Noted: 2021-07-29

## 2021-07-29 PROBLEM — D50.9 IRON DEFICIENCY ANEMIA: Status: ACTIVE | Noted: 2021-07-29

## 2021-07-29 PROBLEM — R53.83 FATIGUE: Status: ACTIVE | Noted: 2021-07-29

## 2021-07-29 PROBLEM — Z12.5 SCREENING FOR MALIGNANT NEOPLASM OF PROSTATE: Status: ACTIVE | Noted: 2021-07-29

## 2021-08-19 PROBLEM — E11.9 TYPE 2 DIABETES MELLITUS WITHOUT COMPLICATION, WITHOUT LONG-TERM CURRENT USE OF INSULIN: Status: ACTIVE | Noted: 2021-08-19

## 2021-08-19 PROBLEM — M15.0 PRIMARY OSTEOARTHRITIS INVOLVING MULTIPLE JOINTS: Status: ACTIVE | Noted: 2021-08-19

## 2021-08-19 PROBLEM — D50.9 IRON DEFICIENCY ANEMIA: Status: ACTIVE | Noted: 2021-08-19

## 2021-08-19 PROBLEM — M15.9 PRIMARY OSTEOARTHRITIS INVOLVING MULTIPLE JOINTS: Status: ACTIVE | Noted: 2021-08-19

## 2021-08-25 ENCOUNTER — TRANSCRIBE ORDERS (OUTPATIENT)
Dept: LAB | Facility: HOSPITAL | Age: 71
End: 2021-08-25

## 2021-08-25 ENCOUNTER — LAB (OUTPATIENT)
Dept: LAB | Facility: HOSPITAL | Age: 71
End: 2021-08-25

## 2021-08-25 DIAGNOSIS — E78.2 MIXED HYPERLIPIDEMIA: ICD-10-CM

## 2021-08-25 DIAGNOSIS — E11.9 TYPE 2 DIABETES MELLITUS WITHOUT COMPLICATION, WITHOUT LONG-TERM CURRENT USE OF INSULIN (HCC): Primary | ICD-10-CM

## 2021-08-25 DIAGNOSIS — E11.9 TYPE 2 DIABETES MELLITUS WITHOUT COMPLICATION, WITHOUT LONG-TERM CURRENT USE OF INSULIN (HCC): ICD-10-CM

## 2021-08-25 LAB
ALBUMIN SERPL-MCNC: 4.4 G/DL (ref 3.5–5.2)
ALBUMIN/GLOB SERPL: 1.8 G/DL
ALP SERPL-CCNC: 77 U/L (ref 39–117)
ALT SERPL W P-5'-P-CCNC: 14 U/L (ref 1–41)
ANION GAP SERPL CALCULATED.3IONS-SCNC: 10.4 MMOL/L (ref 5–15)
AST SERPL-CCNC: 18 U/L (ref 1–40)
BILIRUB SERPL-MCNC: 0.3 MG/DL (ref 0–1.2)
BUN SERPL-MCNC: 16 MG/DL (ref 8–23)
BUN/CREAT SERPL: 12.9 (ref 7–25)
CALCIUM SPEC-SCNC: 9.4 MG/DL (ref 8.6–10.5)
CHLORIDE SERPL-SCNC: 105 MMOL/L (ref 98–107)
CHOLEST SERPL-MCNC: 149 MG/DL (ref 0–200)
CO2 SERPL-SCNC: 24.6 MMOL/L (ref 22–29)
CREAT SERPL-MCNC: 1.24 MG/DL (ref 0.76–1.27)
GFR SERPL CREATININE-BSD FRML MDRD: 69 ML/MIN/1.73
GLOBULIN UR ELPH-MCNC: 2.5 GM/DL
GLUCOSE SERPL-MCNC: 132 MG/DL (ref 65–99)
HBA1C MFR BLD: 7.2 % (ref 4.8–5.6)
HDLC SERPL-MCNC: 50 MG/DL (ref 40–60)
LDLC SERPL CALC-MCNC: 86 MG/DL (ref 0–100)
LDLC/HDLC SERPL: 1.72 {RATIO}
POTASSIUM SERPL-SCNC: 4.3 MMOL/L (ref 3.5–5.2)
PROT SERPL-MCNC: 6.9 G/DL (ref 6–8.5)
SODIUM SERPL-SCNC: 140 MMOL/L (ref 136–145)
TRIGL SERPL-MCNC: 65 MG/DL (ref 0–150)
VLDLC SERPL-MCNC: 13 MG/DL (ref 5–40)

## 2021-08-25 PROCEDURE — 36415 COLL VENOUS BLD VENIPUNCTURE: CPT

## 2021-08-25 PROCEDURE — 80061 LIPID PANEL: CPT

## 2021-08-25 PROCEDURE — 80053 COMPREHEN METABOLIC PANEL: CPT

## 2021-08-25 PROCEDURE — 83036 HEMOGLOBIN GLYCOSYLATED A1C: CPT

## 2021-08-28 PROBLEM — D50.8 IRON DEFICIENCY ANEMIA SECONDARY TO INADEQUATE DIETARY IRON INTAKE: Status: ACTIVE | Noted: 2021-08-28

## 2021-08-28 PROBLEM — D64.9 ABSOLUTE ANEMIA: Status: ACTIVE | Noted: 2021-08-28

## 2021-08-28 NOTE — PROGRESS NOTES
"Chief Complaint  Hypertension and Follow-up (Pt gets SOB with activity, especially in the sun. He was wondering if he could have blockage)    Subjective          Marcio Enrique presents to [facility]     History of Present Illness     68-year-old male with underlying hypertension, hyperlipidemia, diabetes, who is coming in for routine 4-month follow-up.  We will review his labs and evaluate any new concerns.    Review of Systems   Constitutional: Negative for appetite change, fatigue and fever.   HENT: Negative for congestion and ear pain.    Eyes: Negative for blurred vision.   Respiratory: Negative for cough, chest tightness, shortness of breath and wheezing.    Cardiovascular: Negative for chest pain, palpitations and leg swelling.   Gastrointestinal: Negative for abdominal pain.   Genitourinary: Negative for difficulty urinating, dysuria and hematuria.   Musculoskeletal: Negative for arthralgias and gait problem.   Skin: Negative for skin lesions.   Neurological: Negative for syncope, memory problem and confusion.   Psychiatric/Behavioral: Negative for self-injury and depressed mood.       Objective   Vital Signs:   /94   Pulse 59   Temp 97.3 °F (36.3 °C)   Ht 195.6 cm (77\")   Wt 123 kg (270 lb 9.6 oz)   SpO2 99%   BMI 32.09 kg/m²           Physical Exam  Vitals and nursing note reviewed.   Constitutional:       General: He is not in acute distress.     Appearance: Normal appearance. He is not toxic-appearing.   HENT:      Head: Atraumatic.      Right Ear: External ear normal.      Left Ear: External ear normal.      Nose: Nose normal.      Mouth/Throat:      Mouth: Mucous membranes are moist.   Eyes:      General:         Right eye: No discharge.         Left eye: No discharge.      Extraocular Movements: Extraocular movements intact.      Pupils: Pupils are equal, round, and reactive to light.   Cardiovascular:      Rate and Rhythm: Normal rate and regular rhythm. "      Pulses: Normal pulses.      Heart sounds: Normal heart sounds. No murmur heard.   No gallop.    Pulmonary:      Effort: Pulmonary effort is normal. No respiratory distress.      Breath sounds: No wheezing, rhonchi or rales.   Abdominal:      General: There is no distension.      Palpations: Abdomen is soft. There is no mass.      Tenderness: There is no abdominal tenderness. There is no guarding.   Musculoskeletal:         General: No swelling or tenderness.      Cervical back: No tenderness.      Right lower leg: No edema.      Left lower leg: No edema.   Skin:     General: Skin is warm and dry.      Findings: No rash.   Neurological:      General: No focal deficit present.      Mental Status: He is alert and oriented to person, place, and time. Mental status is at baseline.      Motor: No weakness.      Gait: Gait normal.   Psychiatric:         Mood and Affect: Mood normal.         Thought Content: Thought content normal.          Result Review :   The following data was reviewed by: Aleksandr Olmstead MD on 08/23/2021:                  Assessment and Plan    Diagnoses and all orders for this visit:    1. Type 2 diabetes mellitus without complication, without long-term current use of insulin (CMS/[facility]) (Primary)  Overview:  A1c is very stable at 7.2 as of his 8/30/2021 office visit.  This is on low-dose Metformin only.  Continue same treatment.    Orders:  -     Hemoglobin A1c; Future  -     Basic Metabolic Panel; Future    2. Primary osteoarthritis involving multiple joints  Overview:  This is improved as of 8/21 office visit.  He is not requiring any regularly scheduled anti-inflammatories because he has been seeing Dr. Mcgovern and getting steroid injections in his left knee.  He has had 3 as of this dictation.      3. Mixed hyperlipidemia  Overview:  LDL of 86 is at goal as of his August 2021 appointment.  He is maintained on low-dose pravastatin, he is tolerating this, stable on this, so continue same.      4.  Gastro-esophageal reflux disease without esophagitis  Overview:  His issues with reflux and specifically burping are not much improved as of August 2021.  He had increase his Prilosec from every other day to daily, but it has not helped much as of yet.  We will advanced Prilosec to 40 mg daily prior to consideration for further evaluation or additional medications.      5. Essential hypertension  Overview:  Blood pressure was fine last visit, perhaps up a little bit at this one, so patient will monitor at home and let me know.  He has previously been stable on max dose Exforge and very low-dose metoprolol.  We will continue same for now and make changes only if remains elevated at home.      6. Iron deficiency anemia secondary to inadequate dietary iron intake  Overview:  Hemoglobin was stable earlier this year, we will repeat the level in the fall.  Patient is to maintain current dosing of over-the-counter iron pill.    Orders:  -     CBC & Differential; Future  -     Iron Profile; Future  -     Ferritin; Future    7. Dyspnea on exertion  Overview:  This is as of his August 2021 appointment.  He has not had a stress test in many many years, additionally has not had a repeat echo for a very minor ascending aortic aneurysm either.  We will get both of these and take things from there.    Orders:  -     Adult Transthoracic Echo Complete W/ Cont if Necessary Per Protocol; Future  -     Stress Test With Myocardial Perfusion One Day; Future    Other orders  -     omeprazole (priLOSEC) 40 MG capsule; Take 1 capsule by mouth Daily.  Dispense: 90 capsule; Refill: 1       --  --  VISIT 4/21:   ANNUAL MEDICARE WELLNESS EXAM 12/20 = reviewed all forms with pt; no new discoveries.  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  --  DM stable on only 500 qd and that is fine...6.3...6.9 and I d/w bad trend...6.9 is holding on 500 qd only... 7.4 and rec 1000 qd again...7.0...7.4 is due to the steroids, and they are being weaned, so no med changes made 9/19...6.9 is back down off steroids...7.5 and will increase on RTO if same/higher = only on 500 qd recall...6.9 on 1000 qAM as of 8/20 OV---> 7.1 is fine 4/21. (TSH neg 4/21).  --  LIPIDS stable 3/14 w/o treatment...elevated CK without statins anyhow...defer... holding steady...122 and will add statin now...78, but CK up and c/o joints at 5/18 OV, so will hold Pravastatin 10 mg for now...86 is fine...96=ok for now---> 84 in 4/20.  --  HTN with need for checks at home before increase Exforge to max dose as of 2/18 OV... BP stable off HCTZ and on toprol...required increase exforge per Karyn; BP great 4/19 OV despite back on Naproxen past week---> stable 4/21.  --  SYNCOPE 2/18 and to ER for 5 hrs; had neg V/Q; did have aura, so most c/w vasovagal; exam neg; will get ECHO/Dopplers/tilt to complete w/u...ECHO is as below, the Carotids 3/18 were neg, and TILT + for NEUROCARDIOGENIC SYNCOPE=will try to lose HCTZ and add in low dose of toprol; pt to monitor BP and call with results; d/w keep hydrated/heed warning signs...c/o dizziness with rapid head mvmt and rec eval per ENT now...got better on it's own.  ASCENDING AORTIC DILATATION=3.8 cm per ECHO 3/18 (d/w nl 3.5 or less, dilated 3.5 to 4.5, aneurysmal is above 4.4, and surgery above 5.5).  --  DJD with L knee/shoulder requiring MRI's...increased sxs 5/18, so holding statin...c/o knee again 12/18, but doesn't sound severe; OTC meds for now...I was unaware of bump in BP, so I started Naprosyn back a week ago...on prednisone as of 6/19 OV with Dr Mcgovern following now...ESR down 86 to 9 on prednisone as of 9/19  OV...still being weaned as of 12/19 OV and I d/w add tylenol to help offset the prednisone wean.  --  H PYLORI +...d/w tx plan with prilosec 20 mg/amoxil 1000mg/biaxin 500 mg all bid for 14 days...tolerated this.  GERD/BURPPING was better when he was on PPI for above, so try qod...no c/o 10/17.  GALLSTONES noted per below eval and will eval if persistent.  R ABD PAIN to ER, was nauseated, CT=? liver cysts and L renal lesion, he's concerned, so will get MRI 2/16... MRI 3/17 is with stable cysts.   --  BPH with intol to cardura prior, slow stream but no other sx's...defer for now.   --  --  PSA 1.8 (4/2/21)  COLON 12/12...normal...Dr Sutherland...5 yrs given FH+ x 2...d/w 10/17 OV---> d/w again 6/19, but now says neg FH 6/19??? = rec cologuard=neg 7/19.  PNEUMOVAX #1 12/13, Prevnar '16; rec Hep A 5/18; COVID x2 as of 4/21.  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    Follow Up   Return in about 4 months (around 12/30/2021).  Patient was given instructions and counseling regarding his condition or for health maintenance advice. Please see specific information pulled into the AVS if appropriate.

## 2021-08-30 ENCOUNTER — OFFICE VISIT (OUTPATIENT)
Dept: INTERNAL MEDICINE | Facility: CLINIC | Age: 71
End: 2021-08-30

## 2021-08-30 VITALS
WEIGHT: 270.6 LBS | DIASTOLIC BLOOD PRESSURE: 94 MMHG | TEMPERATURE: 97.3 F | HEIGHT: 77 IN | OXYGEN SATURATION: 99 % | SYSTOLIC BLOOD PRESSURE: 165 MMHG | BODY MASS INDEX: 31.95 KG/M2 | HEART RATE: 59 BPM

## 2021-08-30 DIAGNOSIS — E78.2 MIXED HYPERLIPIDEMIA: ICD-10-CM

## 2021-08-30 DIAGNOSIS — R06.09 DYSPNEA ON EXERTION: ICD-10-CM

## 2021-08-30 DIAGNOSIS — M15.9 PRIMARY OSTEOARTHRITIS INVOLVING MULTIPLE JOINTS: ICD-10-CM

## 2021-08-30 DIAGNOSIS — E11.9 TYPE 2 DIABETES MELLITUS WITHOUT COMPLICATION, WITHOUT LONG-TERM CURRENT USE OF INSULIN (HCC): Primary | ICD-10-CM

## 2021-08-30 DIAGNOSIS — K21.9 GASTRO-ESOPHAGEAL REFLUX DISEASE WITHOUT ESOPHAGITIS: ICD-10-CM

## 2021-08-30 DIAGNOSIS — D50.8 IRON DEFICIENCY ANEMIA SECONDARY TO INADEQUATE DIETARY IRON INTAKE: ICD-10-CM

## 2021-08-30 DIAGNOSIS — I10 ESSENTIAL HYPERTENSION: ICD-10-CM

## 2021-08-30 PROCEDURE — 99214 OFFICE O/P EST MOD 30 MIN: CPT | Performed by: INTERNAL MEDICINE

## 2021-08-30 RX ORDER — PRAVASTATIN SODIUM 10 MG
10 TABLET ORAL DAILY
COMMUNITY
Start: 2021-07-30 | End: 2021-09-02

## 2021-08-30 RX ORDER — OMEPRAZOLE 40 MG/1
40 CAPSULE, DELAYED RELEASE ORAL DAILY
COMMUNITY
End: 2021-08-30 | Stop reason: SDUPTHER

## 2021-08-30 RX ORDER — OMEPRAZOLE 40 MG/1
40 CAPSULE, DELAYED RELEASE ORAL DAILY
Qty: 90 CAPSULE | Refills: 1 | Status: SHIPPED | OUTPATIENT
Start: 2021-08-30 | End: 2021-12-27

## 2021-08-30 RX ORDER — IRON POLYSACCHARIDE COMPLEX 200 MG
1 CAPSULE ORAL DAILY
COMMUNITY
Start: 2021-07-16 | End: 2021-09-16

## 2021-08-30 RX ORDER — METOPROLOL SUCCINATE 25 MG/1
12.5 TABLET, EXTENDED RELEASE ORAL DAILY
COMMUNITY
Start: 2021-06-21 | End: 2021-11-22

## 2021-08-30 RX ORDER — METFORMIN HYDROCHLORIDE 500 MG/1
1000 TABLET, EXTENDED RELEASE ORAL
COMMUNITY
End: 2021-10-14

## 2021-08-30 RX ORDER — AMLODIPINE AND VALSARTAN 10; 320 MG/1; MG/1
1 TABLET ORAL DAILY
COMMUNITY
Start: 2021-07-30 | End: 2022-01-12 | Stop reason: SDUPTHER

## 2021-09-02 RX ORDER — OMEPRAZOLE 20 MG/1
CAPSULE, DELAYED RELEASE ORAL
Qty: 90 CAPSULE | Refills: 1 | Status: SHIPPED | OUTPATIENT
Start: 2021-09-02 | End: 2021-12-20 | Stop reason: SDUPTHER

## 2021-09-02 RX ORDER — PRAVASTATIN SODIUM 10 MG
TABLET ORAL
Qty: 90 TABLET | Refills: 1 | Status: SHIPPED | OUTPATIENT
Start: 2021-09-02 | End: 2022-04-20 | Stop reason: SDUPTHER

## 2021-09-16 RX ORDER — IRON POLYSACCHARIDE COMPLEX 200 MG
CAPSULE ORAL
Qty: 90 EACH | Refills: 0 | Status: SHIPPED | OUTPATIENT
Start: 2021-09-16 | End: 2021-12-20

## 2021-10-12 ENCOUNTER — APPOINTMENT (OUTPATIENT)
Dept: NUCLEAR MEDICINE | Facility: HOSPITAL | Age: 71
End: 2021-10-12

## 2021-10-13 ENCOUNTER — HOSPITAL ENCOUNTER (OUTPATIENT)
Dept: NUCLEAR MEDICINE | Facility: HOSPITAL | Age: 71
Discharge: HOME | End: 2021-10-13

## 2021-10-13 ENCOUNTER — HOSPITAL ENCOUNTER (OUTPATIENT)
Dept: CARDIOLOGY | Facility: HOSPITAL | Age: 71
Discharge: HOME | End: 2021-10-13

## 2021-10-13 VITALS — WEIGHT: 271.17 LBS | BODY MASS INDEX: 32.16 KG/M2

## 2021-10-13 DIAGNOSIS — R06.09 DYSPNEA ON EXERTION: ICD-10-CM

## 2021-10-13 LAB
BH CV ECHO MEAS - AO ROOT DIAM: 3.1 CM
BH CV ECHO MEAS - EF(MOD-BP): 69 %
BH CV ECHO MEAS - IVSD: 1.2 CM
BH CV ECHO MEAS - LA DIMENSION(2D): 4 CM
BH CV ECHO MEAS - LAT PEAK E' VEL: 3 CM/SEC
BH CV ECHO MEAS - LVIDD: 4.9 CM
BH CV ECHO MEAS - LVIDS: 3.1 CM
BH CV ECHO MEAS - LVPWD: 1.2 CM
BH CV ECHO MEAS - MED PEAK E' VEL: 4 CM/SEC
BH CV ECHO MEAS - MV A MAX VEL: 98 CM/SEC
BH CV ECHO MEAS - MV DEC TIME: 174 MSEC
BH CV ECHO MEAS - MV E MAX VEL: 57 CM/SEC
BH CV ECHO MEAS - MV E/A: 0.6
BH CV ECHO MEASUREMENTS AVERAGE E/E' RATIO: 16.29
BH CV IMMEDIATE POST RECOVERY TECH DATA SYMPTOMS: NORMAL
BH CV IMMEDIATE POST TECH DATA BLOOD PRESSURE: NORMAL MMHG
BH CV IMMEDIATE POST TECH DATA HEART RATE: 90 BPM
BH CV IMMEDIATE POST TECH DATA OXYGEN SATS: 97 %
BH CV REST NUCLEAR ISOTOPE DOSE: 9.7 MCI
BH CV SIX MINUTE RECOVERY TECH DATA BLOOD PRESSURE: NORMAL
BH CV SIX MINUTE RECOVERY TECH DATA HEART RATE: 86 BPM
BH CV SIX MINUTE RECOVERY TECH DATA OXYGEN SATURATION: 98 %
BH CV SIX MINUTE RECOVERY TECH DATA SYMPTOMS: NORMAL
BH CV STRESS BP STAGE 1: NORMAL
BH CV STRESS DURATION MIN STAGE 1: 3
BH CV STRESS DURATION SEC STAGE 1: 0
BH CV STRESS GRADE STAGE 1: 10
BH CV STRESS HR STAGE 1: 125
BH CV STRESS METS STAGE 1: 5
BH CV STRESS NUCLEAR ISOTOPE DOSE: 37.9 MCI
BH CV STRESS O2 STAGE 1: 98
BH CV STRESS PROTOCOL 1: NORMAL
BH CV STRESS PROTOCOL 2 BP STAGE 1: NORMAL
BH CV STRESS PROTOCOL 2 COMMENTS STAGE 1: NORMAL
BH CV STRESS PROTOCOL 2 DOSE REGADENOSON STAGE 1: 0.4
BH CV STRESS PROTOCOL 2 DURATION MIN STAGE 1: 0
BH CV STRESS PROTOCOL 2 DURATION SEC STAGE 1: 10
BH CV STRESS PROTOCOL 2 HR STAGE 1: 84
BH CV STRESS PROTOCOL 2 O2 STAGE 1: 98
BH CV STRESS PROTOCOL 2 STAGE 1: 1
BH CV STRESS PROTOCOL 2: NORMAL
BH CV STRESS RECOVERY BP: NORMAL MMHG
BH CV STRESS RECOVERY HR: 86 BPM
BH CV STRESS RECOVERY O2: 98 %
BH CV STRESS SPEED STAGE 1: 1.7
BH CV STRESS STAGE 1: 1
BH CV THREE MINUTE POST TECH DATA BLOOD PRESSURE: NORMAL MMHG
BH CV THREE MINUTE POST TECH DATA HEART RATE: 87 BPM
BH CV THREE MINUTE POST TECH DATA OXYGEN SATURATION: 99 %
IVRT: 66 MSEC
LEFT ATRIUM VOLUME INDEX: 30 ML/M2
LV EF NUC BP: 51 %
MAXIMAL PREDICTED HEART RATE: 147 BPM
MAXIMAL PREDICTED HEART RATE: 147 BPM
PERCENT MAX PREDICTED HR: 85.03 %
STRESS BASELINE BP: NORMAL MMHG
STRESS BASELINE HR: 87 BPM
STRESS O2 SAT REST: 98 %
STRESS PERCENT HR: 100 %
STRESS POST EXERCISE DUR MIN: 3 MIN
STRESS POST EXERCISE DUR SEC: 10 SEC
STRESS POST O2 SAT PEAK: 99 %
STRESS POST PEAK BP: NORMAL MMHG
STRESS POST PEAK HR: 125 BPM
STRESS TARGET HR: 125 BPM
STRESS TARGET HR: 125 BPM

## 2021-10-13 PROCEDURE — 78452 HT MUSCLE IMAGE SPECT MULT: CPT | Performed by: SPECIALIST

## 2021-10-13 PROCEDURE — A9502 TC99M TETROFOSMIN: HCPCS | Performed by: INTERNAL MEDICINE

## 2021-10-13 PROCEDURE — 0 TECHNETIUM TETROFOSMIN KIT: Performed by: INTERNAL MEDICINE

## 2021-10-13 PROCEDURE — 93306 TTE W/DOPPLER COMPLETE: CPT

## 2021-10-13 PROCEDURE — 25010000002 REGADENOSON 0.4 MG/5ML SOLUTION: Performed by: INTERNAL MEDICINE

## 2021-10-13 PROCEDURE — 78452 HT MUSCLE IMAGE SPECT MULT: CPT

## 2021-10-13 PROCEDURE — 93306 TTE W/DOPPLER COMPLETE: CPT | Performed by: SPECIALIST

## 2021-10-13 PROCEDURE — 93017 CV STRESS TEST TRACING ONLY: CPT

## 2021-10-13 PROCEDURE — 93016 CV STRESS TEST SUPVJ ONLY: CPT | Performed by: NURSE PRACTITIONER

## 2021-10-13 PROCEDURE — 93018 CV STRESS TEST I&R ONLY: CPT | Performed by: SPECIALIST

## 2021-10-13 RX ADMIN — REGADENOSON 0.4 MG: 0.08 INJECTION, SOLUTION INTRAVENOUS at 10:10

## 2021-10-13 RX ADMIN — TETROFOSMIN 1 DOSE: 1.38 INJECTION, POWDER, LYOPHILIZED, FOR SOLUTION INTRAVENOUS at 08:15

## 2021-10-13 RX ADMIN — TETROFOSMIN 1 DOSE: 1.38 INJECTION, POWDER, LYOPHILIZED, FOR SOLUTION INTRAVENOUS at 10:10

## 2021-10-14 RX ORDER — METFORMIN HYDROCHLORIDE 500 MG/1
TABLET, EXTENDED RELEASE ORAL
Qty: 270 TABLET | Refills: 1 | Status: SHIPPED | OUTPATIENT
Start: 2021-10-14 | End: 2022-03-22

## 2021-11-16 RX ORDER — FERROUS SULFATE 325(65) MG
TABLET ORAL
Qty: 90 TABLET | Refills: 0 | Status: SHIPPED | OUTPATIENT
Start: 2021-11-16 | End: 2021-11-22

## 2021-11-22 RX ORDER — METOPROLOL SUCCINATE 25 MG/1
TABLET, EXTENDED RELEASE ORAL
Qty: 45 TABLET | Refills: 0 | Status: SHIPPED | OUTPATIENT
Start: 2021-11-22 | End: 2022-08-02

## 2021-11-22 RX ORDER — FERROUS SULFATE 325(65) MG
TABLET ORAL
Qty: 90 TABLET | Refills: 0 | Status: SHIPPED | OUTPATIENT
Start: 2021-11-22 | End: 2021-12-20

## 2021-12-14 ENCOUNTER — LAB (OUTPATIENT)
Dept: LAB | Facility: HOSPITAL | Age: 71
End: 2021-12-14

## 2021-12-14 DIAGNOSIS — D50.8 IRON DEFICIENCY ANEMIA SECONDARY TO INADEQUATE DIETARY IRON INTAKE: ICD-10-CM

## 2021-12-14 DIAGNOSIS — E11.9 TYPE 2 DIABETES MELLITUS WITHOUT COMPLICATION, WITHOUT LONG-TERM CURRENT USE OF INSULIN (HCC): ICD-10-CM

## 2021-12-14 LAB
BASOPHILS # BLD AUTO: 0.02 10*3/MM3 (ref 0–0.2)
BASOPHILS NFR BLD AUTO: 0.3 % (ref 0–1.5)
DEPRECATED RDW RBC AUTO: 42.3 FL (ref 37–54)
EOSINOPHIL # BLD AUTO: 0.28 10*3/MM3 (ref 0–0.4)
EOSINOPHIL NFR BLD AUTO: 4.2 % (ref 0.3–6.2)
ERYTHROCYTE [DISTWIDTH] IN BLOOD BY AUTOMATED COUNT: 12.8 % (ref 12.3–15.4)
FERRITIN SERPL-MCNC: 260 NG/ML (ref 30–400)
HBA1C MFR BLD: 7.2 % (ref 4.8–5.6)
HCT VFR BLD AUTO: 40.4 % (ref 37.5–51)
HGB BLD-MCNC: 13.4 G/DL (ref 13–17.7)
IMM GRANULOCYTES # BLD AUTO: 0.02 10*3/MM3 (ref 0–0.05)
IMM GRANULOCYTES NFR BLD AUTO: 0.3 % (ref 0–0.5)
LYMPHOCYTES # BLD AUTO: 2.38 10*3/MM3 (ref 0.7–3.1)
LYMPHOCYTES NFR BLD AUTO: 35.9 % (ref 19.6–45.3)
MCH RBC QN AUTO: 29.9 PG (ref 26.6–33)
MCHC RBC AUTO-ENTMCNC: 33.2 G/DL (ref 31.5–35.7)
MCV RBC AUTO: 90.2 FL (ref 79–97)
MONOCYTES # BLD AUTO: 0.47 10*3/MM3 (ref 0.1–0.9)
MONOCYTES NFR BLD AUTO: 7.1 % (ref 5–12)
NEUTROPHILS NFR BLD AUTO: 3.46 10*3/MM3 (ref 1.7–7)
NEUTROPHILS NFR BLD AUTO: 52.2 % (ref 42.7–76)
NRBC BLD AUTO-RTO: 0 /100 WBC (ref 0–0.2)
PLATELET # BLD AUTO: 335 10*3/MM3 (ref 140–450)
PMV BLD AUTO: 10.2 FL (ref 6–12)
RBC # BLD AUTO: 4.48 10*6/MM3 (ref 4.14–5.8)
WBC NRBC COR # BLD: 6.63 10*3/MM3 (ref 3.4–10.8)

## 2021-12-14 PROCEDURE — 83540 ASSAY OF IRON: CPT

## 2021-12-14 PROCEDURE — 85025 COMPLETE CBC W/AUTO DIFF WBC: CPT

## 2021-12-14 PROCEDURE — 36415 COLL VENOUS BLD VENIPUNCTURE: CPT

## 2021-12-14 PROCEDURE — 80048 BASIC METABOLIC PNL TOTAL CA: CPT

## 2021-12-14 PROCEDURE — 84466 ASSAY OF TRANSFERRIN: CPT

## 2021-12-14 PROCEDURE — 83036 HEMOGLOBIN GLYCOSYLATED A1C: CPT

## 2021-12-14 PROCEDURE — 82728 ASSAY OF FERRITIN: CPT

## 2021-12-15 LAB
ANION GAP SERPL CALCULATED.3IONS-SCNC: 10.5 MMOL/L (ref 5–15)
BUN SERPL-MCNC: 15 MG/DL (ref 8–23)
BUN/CREAT SERPL: 16.3 (ref 7–25)
CALCIUM SPEC-SCNC: 10 MG/DL (ref 8.6–10.5)
CHLORIDE SERPL-SCNC: 106 MMOL/L (ref 98–107)
CO2 SERPL-SCNC: 24.5 MMOL/L (ref 22–29)
CREAT SERPL-MCNC: 0.92 MG/DL (ref 0.76–1.27)
GFR SERPL CREATININE-BSD FRML MDRD: 98 ML/MIN/1.73
GLUCOSE SERPL-MCNC: 132 MG/DL (ref 65–99)
IRON 24H UR-MRATE: 87 MCG/DL (ref 59–158)
IRON SATN MFR SERPL: 25 % (ref 20–50)
POTASSIUM SERPL-SCNC: 4.4 MMOL/L (ref 3.5–5.2)
SODIUM SERPL-SCNC: 141 MMOL/L (ref 136–145)
TIBC SERPL-MCNC: 343 MCG/DL (ref 298–536)
TRANSFERRIN SERPL-MCNC: 230 MG/DL (ref 200–360)

## 2021-12-20 ENCOUNTER — HOSPITAL ENCOUNTER (OUTPATIENT)
Dept: GENERAL RADIOLOGY | Facility: HOSPITAL | Age: 71
Discharge: HOME | End: 2021-12-20
Admitting: INTERNAL MEDICINE

## 2021-12-20 ENCOUNTER — OFFICE VISIT (OUTPATIENT)
Dept: INTERNAL MEDICINE | Facility: CLINIC | Age: 71
End: 2021-12-20

## 2021-12-20 VITALS
WEIGHT: 272 LBS | DIASTOLIC BLOOD PRESSURE: 78 MMHG | OXYGEN SATURATION: 98 % | HEART RATE: 62 BPM | HEIGHT: 77 IN | SYSTOLIC BLOOD PRESSURE: 133 MMHG | TEMPERATURE: 97.2 F | BODY MASS INDEX: 32.12 KG/M2

## 2021-12-20 DIAGNOSIS — Z23 NEED FOR VACCINATION: ICD-10-CM

## 2021-12-20 DIAGNOSIS — R06.09 DYSPNEA ON EXERTION: ICD-10-CM

## 2021-12-20 DIAGNOSIS — D50.8 IRON DEFICIENCY ANEMIA SECONDARY TO INADEQUATE DIETARY IRON INTAKE: ICD-10-CM

## 2021-12-20 DIAGNOSIS — K21.9 GASTRO-ESOPHAGEAL REFLUX DISEASE WITHOUT ESOPHAGITIS: ICD-10-CM

## 2021-12-20 DIAGNOSIS — E11.9 TYPE 2 DIABETES MELLITUS WITHOUT COMPLICATION, WITHOUT LONG-TERM CURRENT USE OF INSULIN (HCC): ICD-10-CM

## 2021-12-20 DIAGNOSIS — I10 ESSENTIAL HYPERTENSION: Primary | ICD-10-CM

## 2021-12-20 DIAGNOSIS — E78.2 MIXED HYPERLIPIDEMIA: ICD-10-CM

## 2021-12-20 PROCEDURE — 71046 X-RAY EXAM CHEST 2 VIEWS: CPT

## 2021-12-20 PROCEDURE — G0008 ADMIN INFLUENZA VIRUS VAC: HCPCS | Performed by: INTERNAL MEDICINE

## 2021-12-20 PROCEDURE — 90662 IIV NO PRSV INCREASED AG IM: CPT | Performed by: INTERNAL MEDICINE

## 2021-12-20 PROCEDURE — 99214 OFFICE O/P EST MOD 30 MIN: CPT | Performed by: INTERNAL MEDICINE

## 2021-12-20 NOTE — PROGRESS NOTES
"Chief Complaint  Diabetes and Follow-up (Pt is still having SOB with activity, He is still having increased burping, his medication was recently increased.)    Subjective          Marcio Enrique presents to [facility]     History of Present Illness   68-year-old male with underlying hypertension, hyperlipidemia, diabetes, who is coming in 12/21 for routine 4-month follow-up.  We will review his labs and evaluate any new concerns.    Review of Systems   Constitutional: Negative for appetite change, fatigue and fever.   HENT: Negative for congestion and ear pain.    Eyes: Negative for blurred vision.   Respiratory: Negative for cough, chest tightness, shortness of breath and wheezing.    Cardiovascular: Negative for chest pain, palpitations and leg swelling.   Gastrointestinal: Negative for abdominal pain.   Genitourinary: Negative for difficulty urinating, dysuria and hematuria.   Musculoskeletal: Negative for arthralgias and gait problem.   Skin: Negative for skin lesions.   Neurological: Negative for syncope, memory problem and confusion.   Psychiatric/Behavioral: Negative for self-injury and depressed mood.       Objective   Vital Signs:   /78   Pulse 62   Temp 97.2 °F (36.2 °C)   Ht 195.6 cm (77\")   Wt 123 kg (272 lb)   SpO2 98%   BMI 32.25 kg/m²           Physical Exam  Vitals and nursing note reviewed.   Constitutional:       General: He is not in acute distress.     Appearance: Normal appearance. He is not toxic-appearing.   HENT:      Head: Atraumatic.      Right Ear: External ear normal.      Left Ear: External ear normal.      Nose: Nose normal.      Mouth/Throat:      Mouth: Mucous membranes are moist.   Eyes:      General:         Right eye: No discharge.         Left eye: No discharge.      Extraocular Movements: Extraocular movements intact.      Pupils: Pupils are equal, round, and reactive to light.   Cardiovascular:      Rate and Rhythm: Normal rate and " regular rhythm.      Pulses: Normal pulses.      Heart sounds: Normal heart sounds. No murmur heard.  No gallop.    Pulmonary:      Effort: Pulmonary effort is normal. No respiratory distress.      Breath sounds: No wheezing, rhonchi or rales.   Abdominal:      General: There is no distension.      Palpations: Abdomen is soft. There is no mass.      Tenderness: There is no abdominal tenderness. There is no guarding.   Musculoskeletal:         General: No swelling or tenderness.      Cervical back: No tenderness.      Right lower leg: No edema.      Left lower leg: No edema.   Skin:     General: Skin is warm and dry.      Findings: No rash.   Neurological:      General: No focal deficit present.      Mental Status: He is alert and oriented to person, place, and time. Mental status is at baseline.      Motor: No weakness.      Gait: Gait normal.   Psychiatric:         Mood and Affect: Mood normal.         Thought Content: Thought content normal.          Result Review :   The following data was reviewed by: Aleksandr Olmstead MD on 08/23/2021:                  Assessment and Plan    Diagnoses and all orders for this visit:    1. Essential hypertension (Primary)  Assessment & Plan:  Blood pressure is fine as of 12/21 office visit.  Patient stable on max dose Exforge as well as low-dose metoprolol.  Continue same.      Orders:  -     Comprehensive Metabolic Panel; Future    2. Gastro-esophageal reflux disease without esophagitis  Assessment & Plan:  His issues with reflux and specifically burping are not much improved as of August 2021.  He had increase his Prilosec from every other day to daily, but it has not helped much as of yet.  We will advanced Prilosec to 40 mg daily prior to consideration for further evaluation or additional medications.---> We will get him off iron and flaxseed as of 12/21 office visit, and consider further evaluation of previously noted gallstones if symptoms are persisting.        3. Mixed  hyperlipidemia  Assessment & Plan:  LDL of 86 is at goal as of his August 2021 appointment.  He is maintained on low-dose pravastatin, he is tolerating this, stable on this, so continue same.---> Repeat on return to office 2022.      Orders:  -     Lipid Panel; Future    4. Type 2 diabetes mellitus without complication, without long-term current use of insulin (HCC)  Assessment & Plan:  A1c is very stable at 7.2 as of his 12/21 office visit.  This is on low-dose Metformin only.  Continue same treatment.      Orders:  -     Hemoglobin A1c; Future    5. Iron deficiency anemia secondary to inadequate dietary iron intake  Assessment & Plan:  Hemoglobin is stable at 13.4 as of 12/21 office visit.  His iron level was 87, ferritin was in the 250 ballpark.  Potential he is having some side effects from these with some GI upset, has had a lot of burping, so we will hold off on oral iron supplementation at this time, follow-up levels next year, consider IV iron if needed.      Orders:  -     CBC & Differential; Future  -     Iron Profile; Future  -     Ferritin; Future    6. Dyspnea on exertion  Assessment & Plan:  This is as of his August 2021 appointment.  He has not had a stress test in many many years, additionally has not had a repeat echo for a very minor ascending aortic aneurysm either.  We will get both of these and take things from there---> Echo 10/21 with EF 51% and no WMA.  SPECT 10/21 with no ischemia.  Unfortunately, patient still with same symptoms, so we will get a chest x-ray with PFTs and then consider referral to cardiology for further eval if these are unrevealing.      Orders:  -     XR Chest PA & Lateral; Future  -     Pulmonary Function Test    7. Need for vaccination    Other orders  -     Fluzone High-Dose 65+yrs (8542-6007)     --  --  Older notes:  VISIT 4/21:   ANNUAL MEDICARE WELLNESS EXAM 12/20 = reviewed all forms with pt; no new discoveries.  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  --  DM stable on only 500 qd and that is fine...6.3...6.9 and I d/w bad trend...6.9 is holding on 500 qd only... 7.4 and rec 1000 qd again...7.0...7.4 is due to the steroids, and they are being weaned, so no med changes made 9/19...6.9 is back down off steroids...7.5 and will increase on RTO if same/higher = only on 500 qd recall...6.9 on 1000 qAM as of 8/20 OV---> 7.1 is fine 4/21. (TSH neg 4/21).  --  LIPIDS stable 3/14 w/o treatment...elevated CK without statins anyhow...defer... holding steady...122 and will add statin now...78, but CK up and c/o joints at 5/18 OV, so will hold Pravastatin 10 mg for now...86 is fine...96=ok for now---> 84 in 4/20.  --  HTN with need for checks at home before increase Exforge to max dose as of 2/18 OV... BP stable off HCTZ and on toprol...required increase exforge per Karyn; BP great 4/19 OV despite back on Naproxen past week---> stable 4/21.  --  SYNCOPE 2/18 and to ER for 5 hrs; had neg V/Q; did have aura, so most c/w vasovagal; exam neg; will get ECHO/Dopplers/tilt to complete w/u...ECHO is as below, the Carotids 3/18 were neg, and TILT + for NEUROCARDIOGENIC SYNCOPE=will try to lose HCTZ and add in low dose of toprol; pt to monitor BP and call with results; d/w keep hydrated/heed warning signs...c/o dizziness with rapid head mvmt and rec eval per ENT now...got better on it's own.  ASCENDING AORTIC DILATATION=3.8 cm per ECHO 3/18 (d/w nl 3.5 or less, dilated 3.5 to 4.5, aneurysmal is above 4.4, and surgery above 5.5).  --  DJD with L knee/shoulder requiring MRI's...increased sxs 5/18, so holding statin...c/o knee again 12/18, but doesn't sound  severe; OTC meds for now...I was unaware of bump in BP, so I started Naprosyn back a week ago...on prednisone as of 6/19 OV with Dr Mcgovern following now...ESR down 86 to 9 on prednisone as of 9/19 OV...still being weaned as of 12/19 OV and I d/w add tylenol to help offset the prednisone wean.  --  H PYLORI +...d/w tx plan with prilosec 20 mg/amoxil 1000mg/biaxin 500 mg all bid for 14 days...tolerated this.  GERD/BURPPING was better when he was on PPI for above, so try qod...no c/o 10/17.  GALLSTONES noted per below eval and will eval if persistent.  R ABD PAIN to ER, was nauseated, CT=? liver cysts and L renal lesion, he's concerned, so will get MRI 2/16... MRI 3/17 is with stable cysts.   --  BPH with intol to cardura prior, slow stream but no other sx's...defer for now.   --  --  PSA 1.8 (4/2/21)  COLON 12/12...normal...Dr Sutherland...5 yrs given FH+ x 2...d/w 10/17 OV---> d/w again 6/19, but now says neg FH 6/19??? = rec cologuard=neg 7/19.  PNEUMOVAX #1 12/13, Prevnar '16; rec Hep A 5/18; COVID x2 as of 4/21.  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    Follow Up   Return in about 4 months (around 4/20/2022).  Patient was given instructions and counseling regarding his condition or for health maintenance advice. Please see specific information pulled into the AVS if appropriate.

## 2021-12-20 NOTE — ASSESSMENT & PLAN NOTE
Blood pressure is fine as of 12/21 office visit.  Patient stable on max dose Exforge as well as low-dose metoprolol.  Continue same.

## 2021-12-20 NOTE — ASSESSMENT & PLAN NOTE
This is improved as of 8/21 office visit.  He is not requiring any regularly scheduled anti-inflammatories because he has been seeing Dr. Mcgovern and getting steroid injections in his left knee.  He has had 3 as of this dictation.---> Juan Carlos 12/21.

## 2021-12-20 NOTE — ASSESSMENT & PLAN NOTE
Hemoglobin is stable at 13.4 as of 12/21 office visit.  His iron level was 87, ferritin was in the 250 ballpark.  Potential he is having some side effects from these with some GI upset, has had a lot of burping, so we will hold off on oral iron supplementation at this time, follow-up levels next year, consider IV iron if needed.

## 2021-12-20 NOTE — ASSESSMENT & PLAN NOTE
This is as of his August 2021 appointment.  He has not had a stress test in many many years, additionally has not had a repeat echo for a very minor ascending aortic aneurysm either.  We will get both of these and take things from there---> Echo 10/21 with EF 51% and no WMA.  SPECT 10/21 with no ischemia.  Unfortunately, patient still with same symptoms, so we will get a chest x-ray with PFTs and then consider referral to cardiology for further eval if these are unrevealing.

## 2021-12-20 NOTE — ASSESSMENT & PLAN NOTE
His issues with reflux and specifically burping are not much improved as of August 2021.  He had increase his Prilosec from every other day to daily, but it has not helped much as of yet.  We will advanced Prilosec to 40 mg daily prior to consideration for further evaluation or additional medications.---> We will get him off iron and flaxseed as of 12/21 office visit, and consider further evaluation of previously noted gallstones if symptoms are persisting.

## 2021-12-20 NOTE — ASSESSMENT & PLAN NOTE
A1c is very stable at 7.2 as of his 12/21 office visit.  This is on low-dose Metformin only.  Continue same treatment.

## 2021-12-20 NOTE — ASSESSMENT & PLAN NOTE
LDL of 86 is at goal as of his August 2021 appointment.  He is maintained on low-dose pravastatin, he is tolerating this, stable on this, so continue same.---> Repeat on return to office 2022.

## 2021-12-27 RX ORDER — OMEPRAZOLE 40 MG/1
CAPSULE, DELAYED RELEASE ORAL
Qty: 90 CAPSULE | Refills: 1 | Status: SHIPPED | OUTPATIENT
Start: 2021-12-27 | End: 2022-04-20 | Stop reason: SDUPTHER

## 2022-01-12 ENCOUNTER — TELEPHONE (OUTPATIENT)
Dept: INTERNAL MEDICINE | Facility: CLINIC | Age: 72
End: 2022-01-12

## 2022-01-12 RX ORDER — AMLODIPINE AND VALSARTAN 10; 320 MG/1; MG/1
1 TABLET ORAL DAILY
Qty: 90 TABLET | Refills: 1 | Status: SHIPPED | OUTPATIENT
Start: 2022-01-12 | End: 2022-07-25

## 2022-01-20 ENCOUNTER — TELEPHONE (OUTPATIENT)
Dept: INTERNAL MEDICINE | Facility: CLINIC | Age: 72
End: 2022-01-20

## 2022-01-20 RX ORDER — MECLIZINE HYDROCHLORIDE 25 MG/1
TABLET ORAL
Qty: 30 TABLET | Refills: 1 | Status: SHIPPED | OUTPATIENT
Start: 2022-01-20 | End: 2022-08-22

## 2022-01-20 NOTE — TELEPHONE ENCOUNTER
Pt has dizziness with fast movement. His BP yesterday was 154/96 HR 59.     He didn't know if this was Vertigo, he thought maybe Meclizine would help him.

## 2022-02-15 RX ORDER — FERROUS SULFATE 325(65) MG
TABLET ORAL
Qty: 90 TABLET | Refills: 0 | OUTPATIENT
Start: 2022-02-15

## 2022-02-20 NOTE — PROGRESS NOTES
"Chief Complaint  Hypertension (Pt feels that his BP is elevated and he is still having trouble with dizziness. He states that with quick movement of his head it is worse. His daughter feels that he is eating to much salt. 5-6 days ago he stopped adding salt. )    Subjective          Marcio Enrique presents to [facility]     History of Present Illness   68-year-old male with underlying hypertension, hyperlipidemia, diabetes, who was seen 12/21 for routine 4-month follow-up, and who is coming in 2/22 for new issue I believe.     Review of Systems   Constitutional: Negative for appetite change, fatigue and fever.   HENT: Negative for congestion and ear pain.    Eyes: Negative for blurred vision.   Respiratory: Negative for cough, chest tightness, shortness of breath and wheezing.    Cardiovascular: Negative for chest pain, palpitations and leg swelling.   Gastrointestinal: Negative for abdominal pain.   Genitourinary: Negative for difficulty urinating, dysuria and hematuria.   Musculoskeletal: Negative for arthralgias and gait problem.   Skin: Negative for skin lesions.   Neurological: Negative for syncope, memory problem and confusion.   Psychiatric/Behavioral: Negative for self-injury and depressed mood.       Objective   Vital Signs:   /80   Pulse 60   Temp 97.3 °F (36.3 °C)   Ht 195.6 cm (77.01\")   Wt 126 kg (278 lb 9.6 oz)   SpO2 99%   BMI 33.03 kg/m²           Physical Exam  Vitals and nursing note reviewed.   Constitutional:       General: He is not in acute distress.     Appearance: Normal appearance. He is not toxic-appearing.   HENT:      Head: Atraumatic.      Right Ear: External ear normal.      Left Ear: External ear normal.      Nose: Nose normal.      Mouth/Throat:      Mouth: Mucous membranes are moist.   Eyes:      General:         Right eye: No discharge.         Left eye: No discharge.      Extraocular Movements: Extraocular movements intact.      Pupils: " Pupils are equal, round, and reactive to light.   Cardiovascular:      Rate and Rhythm: Normal rate and regular rhythm.      Pulses: Normal pulses.      Heart sounds: Normal heart sounds. No murmur heard.  No gallop.    Pulmonary:      Effort: Pulmonary effort is normal. No respiratory distress.      Breath sounds: No wheezing, rhonchi or rales.   Abdominal:      General: There is no distension.      Palpations: Abdomen is soft. There is no mass.      Tenderness: There is no abdominal tenderness. There is no guarding.   Musculoskeletal:         General: No swelling or tenderness.      Cervical back: No tenderness.      Right lower leg: No edema.      Left lower leg: No edema.   Skin:     General: Skin is warm and dry.      Findings: No rash.   Neurological:      General: No focal deficit present.      Mental Status: He is alert and oriented to person, place, and time. Mental status is at baseline.      Motor: No weakness.      Gait: Gait normal.   Psychiatric:         Mood and Affect: Mood normal.         Thought Content: Thought content normal.          Result Review :   The following data was reviewed by: Aleksandr Olmstead MD on 08/23/2021:                  Assessment and Plan    Diagnoses and all orders for this visit:    1. Essential hypertension (Primary)  Assessment & Plan:  Blood pressures been well controlled his past 2 office visits as of his 2/22 urgent visit.  The patient is compliant with max dose amlodipine/valsartan as well as very low-dose metoprolol.  I recommend he continue same.  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  Discussed with the patient we be happy to send him a note to inform them that we are comfortable with his current blood pressure readings, I think the reading in their office was just a spurious one.  Additionally, I agree with his daughter's recommendation that he back off on his sodium chloride intake.      2. Type 2 diabetes  mellitus without complication, without long-term current use of insulin (HCC)  Assessment & Plan:  Blood sugars remain under 150 at home when he checks it.  Certainly no indication for checking too frequently, just periodically.  Continue with dose Metformin only.      3. Vertigo  Assessment & Plan:  Patient with what sounds like mild BPV, he is on low-dose meclizine, but we discussed 2/22 discontinuing that.  Only concern is that the patient is a , he is having episodes with quick movements of his head, so we will get ENTs opinion on this.    Orders:  -     Cancel: Ambulatory Referral to ENT (Otolaryngology)  -     Ambulatory Referral to ENT (Otolaryngology)     --  --  Older notes:  VISIT 4/21:   ANNUAL MEDICARE WELLNESS EXAM 12/20 = reviewed all forms with pt; no new discoveries.  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  --  DM stable on only 500 qd and that is fine...6.3...6.9 and I d/w bad trend...6.9 is holding on 500 qd only... 7.4 and rec 1000 qd again...7.0...7.4 is due to the steroids, and they are being weaned, so no med changes made 9/19...6.9 is back down off steroids...7.5 and will increase on RTO if same/higher = only on 500 qd recall...6.9 on 1000 qAM as of 8/20 OV---> 7.1 is fine 4/21. (TSH neg 4/21).  --  LIPIDS stable 3/14 w/o treatment...elevated CK without statins anyhow...defer... holding steady...122 and will add statin now...78, but CK up and c/o joints at 5/18 OV, so will hold Pravastatin 10 mg for now...86 is fine...96=ok for now---> 84 in 4/20.  --  HTN with need for checks at home before increase Exforge to max dose as of 2/18 OV... BP stable  off HCTZ and on toprol...required increase exforge per Karyn; BP great 4/19 OV despite back on Naproxen past week---> stable 4/21.  --  SYNCOPE 2/18 and to ER for 5 hrs; had neg V/Q; did have aura, so most c/w vasovagal; exam neg; will get ECHO/Dopplers/tilt to complete w/u...ECHO is as below, the Carotids 3/18 were neg, and TILT + for NEUROCARDIOGENIC SYNCOPE=will try to lose HCTZ and add in low dose of toprol; pt to monitor BP and call with results; d/w keep hydrated/heed warning signs...c/o dizziness with rapid head mvmt and rec eval per ENT now...got better on it's own.  ASCENDING AORTIC DILATATION=3.8 cm per ECHO 3/18 (d/w nl 3.5 or less, dilated 3.5 to 4.5, aneurysmal is above 4.4, and surgery above 5.5).  --  DJD with L knee/shoulder requiring MRI's...increased sxs 5/18, so holding statin...c/o knee again 12/18, but doesn't sound severe; OTC meds for now...I was unaware of bump in BP, so I started Naprosyn back a week ago...on prednisone as of 6/19 OV with Dr Mcgovern following now...ESR down 86 to 9 on prednisone as of 9/19 OV...still being weaned as of 12/19 OV and I d/w add tylenol to help offset the prednisone wean.  --  H PYLORI +...d/w tx plan with prilosec 20 mg/amoxil 1000mg/biaxin 500 mg all bid for 14 days...tolerated this.  GERD/BURPPING was better when he was on PPI for above, so try qod...no c/o 10/17.  GALLSTONES noted per below eval and will eval if persistent.  R ABD PAIN to ER, was nauseated, CT=? liver cysts and L renal lesion, he's concerned, so will get MRI 2/16... MRI 3/17 is with stable cysts.   --  BPH with intol to cardura prior, slow stream but no other sx's...defer for now.   --  --  PSA 1.8 (4/2/21)  COLON 12/12...normal...Dr Sutherland...5 yrs given FH+ x 2...d/w 10/17 OV---> d/w again 6/19, but now says neg FH 6/19??? = rec cologuard=neg 7/19.  PNEUMOVAX #1 12/13, Prevnar '16; rec Hep A 5/18; COVID x2 as of 4/21.  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    Follow Up   Return for Next scheduled follow up.  Patient was given instructions and counseling regarding his condition or for health maintenance advice. Please see specific information pulled into the AVS if appropriate.

## 2022-02-21 ENCOUNTER — OFFICE VISIT (OUTPATIENT)
Dept: INTERNAL MEDICINE | Facility: CLINIC | Age: 72
End: 2022-02-21

## 2022-02-21 VITALS
WEIGHT: 278.6 LBS | OXYGEN SATURATION: 99 % | TEMPERATURE: 97.3 F | BODY MASS INDEX: 32.9 KG/M2 | HEIGHT: 77 IN | DIASTOLIC BLOOD PRESSURE: 80 MMHG | SYSTOLIC BLOOD PRESSURE: 130 MMHG | HEART RATE: 60 BPM

## 2022-02-21 DIAGNOSIS — I10 ESSENTIAL HYPERTENSION: Primary | ICD-10-CM

## 2022-02-21 DIAGNOSIS — E11.9 TYPE 2 DIABETES MELLITUS WITHOUT COMPLICATION, WITHOUT LONG-TERM CURRENT USE OF INSULIN: ICD-10-CM

## 2022-02-21 DIAGNOSIS — R42 VERTIGO: ICD-10-CM

## 2022-02-21 PROCEDURE — 99213 OFFICE O/P EST LOW 20 MIN: CPT | Performed by: INTERNAL MEDICINE

## 2022-02-21 NOTE — ASSESSMENT & PLAN NOTE
Blood pressures been well controlled his past 2 office visits as of his 2/22 urgent visit.  The patient is compliant with max dose amlodipine/valsartan as well as very low-dose metoprolol.  I recommend he continue same.  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  Discussed with the patient we be happy to send him a note to inform them that we are comfortable with his current blood pressure readings, I think the reading in their office was just a spurious one.  Additionally, I agree with his daughter's recommendation that he back off on his sodium chloride intake.

## 2022-02-21 NOTE — ASSESSMENT & PLAN NOTE
Patient with what sounds like mild BPV, he is on low-dose meclizine, but we discussed 2/22 discontinuing that.  Only concern is that the patient is a , he is having episodes with quick movements of his head, so we will get ENTs opinion on this.

## 2022-02-21 NOTE — ASSESSMENT & PLAN NOTE
Blood sugars remain under 150 at home when he checks it.  Certainly no indication for checking too frequently, just periodically.  Continue with dose Metformin only.

## 2022-03-22 RX ORDER — METFORMIN HYDROCHLORIDE 500 MG/1
1000 TABLET, EXTENDED RELEASE ORAL
Qty: 180 TABLET | Refills: 1 | Status: SHIPPED | OUTPATIENT
Start: 2022-03-22 | End: 2022-06-06

## 2022-03-22 NOTE — TELEPHONE ENCOUNTER
Caller: Marcio Enrique    Relationship: Self    Best call back number: [phone]     Requested Prescriptions:   Requested Prescriptions     Pending Prescriptions Disp Refills   • metFORMIN ER (GLUCOPHAGE-XR) 500 MG 24 hr tablet [Pharmacy Med Name: METFORMIN HCL  MG TABLET] 270 tablet 1     Sig: TAKE 1 TABLET BY MOUTH EVERY MORNING AND 2 TABLETS BY MOUTH EVERY EVENING        Pharmacy where request should be sent: [facility]/PHARMACY #[id number] - [place], [place] - [address] [facility] [phone] [facility] [phone] FX     Additional details provided by patient: PATIENT IS NEEDING A REFILL. PLEASE CALL AND ADVISE.     Does the patient have less than a 3 day supply:  [x] Yes  [] No    Sheryl Fitch Rep   03/22/22 15:21 EDT

## 2022-03-28 ENCOUNTER — OFFICE VISIT (OUTPATIENT)
Dept: OTOLARYNGOLOGY | Facility: CLINIC | Age: 72
End: 2022-03-28

## 2022-03-28 VITALS — TEMPERATURE: 96 F | HEIGHT: 78 IN | WEIGHT: 271 LBS | BODY MASS INDEX: 31.35 KG/M2

## 2022-03-28 DIAGNOSIS — R42 DIZZINESS: Primary | ICD-10-CM

## 2022-03-28 PROCEDURE — 99203 OFFICE O/P NEW LOW 30 MIN: CPT | Performed by: OTOLARYNGOLOGY

## 2022-03-28 NOTE — PROGRESS NOTES
"Patient Name: Marcio Enrique   Visit Date: 03/28/2022   Patient ID: [id number]  Provider: Fan Ragsdale MD    Sex: male  Location: [facility]   YOB: 1950  Location Address: [address] [place] [address], [address] [place] [address],?[place]?[id number]    Primary Care Provider Aleksandr Olmstead MD  Location Phone: [phone]    Referring Provider: Aleksandr Olmstead MD        Chief Complaint  Dizziness    History of Present Illness  Marcio Enrique is a 71 y.o. male who presents to [facility] today as a consult from Aleksandr Olmstead MD for evaluation of dizziness.  He tells me that this initially began around 3 months ago.  He cannot recall any inciting illness or incident.  He tells me that very quick head movements tend to make him feel \"swimmy headed\".  It is almost like his visual field shifts.  He denies any issues with vertigo, lightheadedness, loss of consciousness, diplopia, sensation changes, dysarthria, or dysphagia.  These episodes do not occur daily and only last a few seconds.  He denies any issues with hearing loss, tinnitus, otalgia, otorrhea, or headache.  He has been taking meclizine daily which seems to be helpful.  He has not undergone any imaging.      Past Medical History:   Diagnosis Date   • Acid reflux    • Dizziness    • DJD (degenerative joint disease)     LEFT SHOULDER; LEFT KNEE= MRI   • Essential (primary) hypertension    • Gastro-esophageal reflux disease without esophagitis    • High cholesterol    • HTN (hypertension)    • Iron deficiency anemia, unspecified    • Mixed hyperlipidemia    • Neuropathy    • Other fatigue    • Partial tear of left rotator cuff    • PUD (peptic ulcer disease)    • Pure hypercholesterolemia     INCREASED CK WITH ALL   • Type 2 diabetes mellitus without complication (HCC)    • Unspecified osteoarthritis, unspecified site        Past Surgical History:   Procedure Laterality Date   • CIRCUMCISION     • KNEE ACL RECONSTRUCTION " "     left   • KNEE SURGERY Left     CLEAN    • TOOTH EXTRACTION           Current Outpatient Medications:   •  amLODIPine-valsartan (EXFORGE)  MG per tablet, Take 1 tablet by mouth Daily., Disp: 90 tablet, Rfl: 1  •  aspirin 81 MG tablet, Take 81 mg by mouth Daily., Disp: , Rfl:   •  glucose blood test strip, Use as instructed, Disp: 200 each, Rfl: 12  •  meclizine (ANTIVERT) 25 MG tablet, 1/2 to 1 tablet 4 times daily as needed for vertigo, caution with driving., Disp: 30 tablet, Rfl: 1  •  metFORMIN ER (GLUCOPHAGE-XR) 500 MG 24 hr tablet, Take 2 tablets by mouth Daily With Breakfast., Disp: 180 tablet, Rfl: 1  •  metoprolol succinate XL (TOPROL-XL) 25 MG 24 hr tablet, TAKE 1/2 TABLET BY MOUTH DAILY (Patient taking differently: Take 12.5 mg by mouth Daily.), Disp: 45 tablet, Rfl: 0  •  omeprazole (priLOSEC) 40 MG capsule, TAKE 1 CAPSULE BY MOUTH EVERY DAY, Disp: 90 capsule, Rfl: 1  •  pravastatin (PRAVACHOL) 10 MG tablet, TAKE 1 TABLET BY MOUTH EVERY DAY (Patient taking differently: Take 10 mg by mouth Daily.), Disp: 90 tablet, Rfl: 1  •  sertraline (ZOLOFT) 50 MG tablet, TAKE 1 TABLET BY MOUTH EVERY DAY (Patient taking differently: Take 50 mg by mouth Daily.), Disp: 90 tablet, Rfl: 1     No Known Allergies    Social History     Tobacco Use   • Smoking status: Never Smoker   • Smokeless tobacco: Never Used   Vaping Use   • Vaping Use: Never used   Substance Use Topics   • Alcohol use: No   • Drug use: No        Objective     Vital Signs:   Temp 96 °F (35.6 °C) (Temporal)   Ht 198.1 cm (78\")   Wt 123 kg (271 lb)   BMI 31.32 kg/m²       Physical Exam    General: Well developed, well nourished patient of stated age in no acute distress. Voice is strong and clear.   Head: Normocephalic and atraumatic.  Face: No lesions.  Bilateral parotid and submandibular glands are unremarkable.  Stensen's and Warthin's ducts are productive of clear saliva bilaterally.  House-Brackmann I/VI     bilaterally.   " muscles and temporomandibular joint nontender to palpation.  No TMJ crepitus.  Eyes: PERRLA, sclerae anicteric, no conjunctival injection. Extra ocular movements are intact and full. No nystagmus.   Ears: Auricles are normal in appearance. Bilateral external auditory canals are unremarkable. Bilateral tympanic membranes are clear and without effusion. Hearing normal to conversational voice.   Nose: External nose is normal in appearance. Bilateral nares are patent with normal appearing mucosa. Septum midline. Turbinates are unremarkable. No lesions.   Oral Cavity: Lips are normal in appearance. Oral mucosa is unremarkable. Gingiva is unremarkable.  Partial dentition for age. Tongue is unremarkable with good movement. Hard palate is unremarkable.   Oropharynx: Soft palate is unremarkable with full movement. Uvula is unremarkable. Bilateral tonsils are unremarkable. Posterior oropharynx is unremarkable.    Larynx and hypopharynx: Deferred secondary to gag reflex.  Neck: Supple.  No mass.  Nontender to palpation.  Trachea midline. Thyroid normal size and without nodules to palpation.   Lymphatic: No lymphadenopathy upon palpation.  Respiratory: Clear to auscultation bilaterally, nonlabored respirations    Cardiovascular: RRR, no murmurs, rubs, or gallops,   Psychiatric: Appropriate affect, cooperative   Neurologic: Oriented x 3, strength symmetric in all extremities, Cranial Nerves II-XII are grossly intact to confrontation.  Bilateral finger-nose and heel-to-shin are unremarkable.  Romberg testing is negative.  Emory-Hallpike testing is negative.   Skin: Warm and dry. No rashes.    Procedures           Result Review :               Assessment and Plan    Diagnoses and all orders for this visit:    1. Dizziness (Primary)    Impressions and findings were discussed at great length.  We discussed the differential including both central and peripheral causes of vestibulopathy.  Examination today does not reveal any current  evidence of vestibulopathy although we discussed that the meclizine can sometimes mask nystagmus.  Options for further evaluation management were discussed including referral for an audiogram and video nystagmography testing versus referral for vestibular  rehabilitation.  After a thorough discussion he feels as though he is doing much better as these episodes are fewer and far between.  I have asked him to continue the meclizine for another week prior to stopping it altogether to see how he does.  If his symptoms return he will call to arrange further evaluation and management.        Follow Up   Return if symptoms worsen or fail to improve.  Patient was given instructions and counseling regarding his condition or for health maintenance advice. Please see specific information pulled into the AVS if appropriate.

## 2022-04-07 ENCOUNTER — HOSPITAL ENCOUNTER (OUTPATIENT)
Dept: RESPIRATORY THERAPY | Facility: HOSPITAL | Age: 72
Discharge: HOME | End: 2022-04-07
Admitting: INTERNAL MEDICINE

## 2022-04-07 PROCEDURE — 94729 DIFFUSING CAPACITY: CPT | Performed by: INTERNAL MEDICINE

## 2022-04-07 PROCEDURE — 94060 EVALUATION OF WHEEZING: CPT | Performed by: INTERNAL MEDICINE

## 2022-04-07 PROCEDURE — 94726 PLETHYSMOGRAPHY LUNG VOLUMES: CPT | Performed by: INTERNAL MEDICINE

## 2022-04-07 PROCEDURE — 94060 EVALUATION OF WHEEZING: CPT

## 2022-04-07 PROCEDURE — 94726 PLETHYSMOGRAPHY LUNG VOLUMES: CPT

## 2022-04-07 PROCEDURE — 94729 DIFFUSING CAPACITY: CPT

## 2022-04-07 RX ORDER — ALBUTEROL SULFATE 2.5 MG/3ML
2.5 SOLUTION RESPIRATORY (INHALATION) ONCE
Status: COMPLETED | OUTPATIENT
Start: 2022-04-07 | End: 2022-04-07

## 2022-04-07 RX ADMIN — ALBUTEROL SULFATE 2.5 MG: 2.5 SOLUTION RESPIRATORY (INHALATION) at 07:01

## 2022-04-18 ENCOUNTER — LAB (OUTPATIENT)
Dept: LAB | Facility: HOSPITAL | Age: 72
End: 2022-04-18

## 2022-04-18 DIAGNOSIS — I10 ESSENTIAL HYPERTENSION: ICD-10-CM

## 2022-04-18 DIAGNOSIS — E11.9 TYPE 2 DIABETES MELLITUS WITHOUT COMPLICATION, WITHOUT LONG-TERM CURRENT USE OF INSULIN: ICD-10-CM

## 2022-04-18 DIAGNOSIS — E78.2 MIXED HYPERLIPIDEMIA: ICD-10-CM

## 2022-04-18 DIAGNOSIS — D50.8 IRON DEFICIENCY ANEMIA SECONDARY TO INADEQUATE DIETARY IRON INTAKE: ICD-10-CM

## 2022-04-18 LAB
ALBUMIN SERPL-MCNC: 4.5 G/DL (ref 3.5–5.2)
ALBUMIN/GLOB SERPL: 1.8 G/DL
ALP SERPL-CCNC: 79 U/L (ref 39–117)
ALT SERPL W P-5'-P-CCNC: 14 U/L (ref 1–41)
ANION GAP SERPL CALCULATED.3IONS-SCNC: 11 MMOL/L (ref 5–15)
AST SERPL-CCNC: 19 U/L (ref 1–40)
BASOPHILS # BLD AUTO: 0.03 10*3/MM3 (ref 0–0.2)
BASOPHILS NFR BLD AUTO: 0.5 % (ref 0–1.5)
BILIRUB SERPL-MCNC: 0.3 MG/DL (ref 0–1.2)
BUN SERPL-MCNC: 13 MG/DL (ref 8–23)
BUN/CREAT SERPL: 9.8 (ref 7–25)
CALCIUM SPEC-SCNC: 9.5 MG/DL (ref 8.6–10.5)
CHLORIDE SERPL-SCNC: 104 MMOL/L (ref 98–107)
CHOLEST SERPL-MCNC: 164 MG/DL (ref 0–200)
CO2 SERPL-SCNC: 25 MMOL/L (ref 22–29)
CREAT SERPL-MCNC: 1.32 MG/DL (ref 0.76–1.27)
DEPRECATED RDW RBC AUTO: 40.5 FL (ref 37–54)
EGFRCR SERPLBLD CKD-EPI 2021: 57 ML/MIN/1.73
EOSINOPHIL # BLD AUTO: 0.27 10*3/MM3 (ref 0–0.4)
EOSINOPHIL NFR BLD AUTO: 4.4 % (ref 0.3–6.2)
ERYTHROCYTE [DISTWIDTH] IN BLOOD BY AUTOMATED COUNT: 12.5 % (ref 12.3–15.4)
FERRITIN SERPL-MCNC: 212 NG/ML (ref 30–400)
GLOBULIN UR ELPH-MCNC: 2.5 GM/DL
GLUCOSE SERPL-MCNC: 148 MG/DL (ref 65–99)
HBA1C MFR BLD: 8.2 % (ref 4.8–5.6)
HCT VFR BLD AUTO: 43.2 % (ref 37.5–51)
HDLC SERPL-MCNC: 51 MG/DL (ref 40–60)
HGB BLD-MCNC: 14.3 G/DL (ref 13–17.7)
IMM GRANULOCYTES # BLD AUTO: 0.02 10*3/MM3 (ref 0–0.05)
IMM GRANULOCYTES NFR BLD AUTO: 0.3 % (ref 0–0.5)
IRON 24H UR-MRATE: 85 MCG/DL (ref 59–158)
IRON SATN MFR SERPL: 24 % (ref 20–50)
LDLC SERPL CALC-MCNC: 95 MG/DL (ref 0–100)
LDLC/HDLC SERPL: 1.83 {RATIO}
LYMPHOCYTES # BLD AUTO: 2.34 10*3/MM3 (ref 0.7–3.1)
LYMPHOCYTES NFR BLD AUTO: 38.4 % (ref 19.6–45.3)
MCH RBC QN AUTO: 29.1 PG (ref 26.6–33)
MCHC RBC AUTO-ENTMCNC: 33.1 G/DL (ref 31.5–35.7)
MCV RBC AUTO: 88 FL (ref 79–97)
MONOCYTES # BLD AUTO: 0.49 10*3/MM3 (ref 0.1–0.9)
MONOCYTES NFR BLD AUTO: 8 % (ref 5–12)
NEUTROPHILS NFR BLD AUTO: 2.95 10*3/MM3 (ref 1.7–7)
NEUTROPHILS NFR BLD AUTO: 48.4 % (ref 42.7–76)
NRBC BLD AUTO-RTO: 0 /100 WBC (ref 0–0.2)
PLATELET # BLD AUTO: 281 10*3/MM3 (ref 140–450)
PMV BLD AUTO: 9.8 FL (ref 6–12)
POTASSIUM SERPL-SCNC: 4.6 MMOL/L (ref 3.5–5.2)
PROT SERPL-MCNC: 7 G/DL (ref 6–8.5)
RBC # BLD AUTO: 4.91 10*6/MM3 (ref 4.14–5.8)
SODIUM SERPL-SCNC: 140 MMOL/L (ref 136–145)
TIBC SERPL-MCNC: 361 MCG/DL (ref 298–536)
TRANSFERRIN SERPL-MCNC: 242 MG/DL (ref 200–360)
TRIGL SERPL-MCNC: 99 MG/DL (ref 0–150)
VLDLC SERPL-MCNC: 18 MG/DL (ref 5–40)
WBC NRBC COR # BLD: 6.1 10*3/MM3 (ref 3.4–10.8)

## 2022-04-18 PROCEDURE — 84466 ASSAY OF TRANSFERRIN: CPT

## 2022-04-18 PROCEDURE — 83540 ASSAY OF IRON: CPT

## 2022-04-18 PROCEDURE — 85025 COMPLETE CBC W/AUTO DIFF WBC: CPT

## 2022-04-18 PROCEDURE — 80061 LIPID PANEL: CPT

## 2022-04-18 PROCEDURE — 83036 HEMOGLOBIN GLYCOSYLATED A1C: CPT

## 2022-04-18 PROCEDURE — 82728 ASSAY OF FERRITIN: CPT

## 2022-04-18 PROCEDURE — 36415 COLL VENOUS BLD VENIPUNCTURE: CPT

## 2022-04-18 PROCEDURE — 80053 COMPREHEN METABOLIC PANEL: CPT

## 2022-04-18 PROCEDURE — 82550 ASSAY OF CK (CPK): CPT

## 2022-04-20 ENCOUNTER — OFFICE VISIT (OUTPATIENT)
Dept: INTERNAL MEDICINE | Facility: CLINIC | Age: 72
End: 2022-04-20

## 2022-04-20 VITALS
OXYGEN SATURATION: 96 % | TEMPERATURE: 98.4 F | DIASTOLIC BLOOD PRESSURE: 89 MMHG | SYSTOLIC BLOOD PRESSURE: 155 MMHG | BODY MASS INDEX: 31.8 KG/M2 | WEIGHT: 274.8 LBS | HEART RATE: 64 BPM | HEIGHT: 78 IN

## 2022-04-20 DIAGNOSIS — K21.9 GASTRO-ESOPHAGEAL REFLUX DISEASE WITHOUT ESOPHAGITIS: ICD-10-CM

## 2022-04-20 DIAGNOSIS — R10.13 EPIGASTRIC PAIN: ICD-10-CM

## 2022-04-20 DIAGNOSIS — E78.2 MIXED HYPERLIPIDEMIA: ICD-10-CM

## 2022-04-20 DIAGNOSIS — E11.9 TYPE 2 DIABETES MELLITUS WITHOUT COMPLICATION, WITHOUT LONG-TERM CURRENT USE OF INSULIN: ICD-10-CM

## 2022-04-20 DIAGNOSIS — R53.83 OTHER FATIGUE: ICD-10-CM

## 2022-04-20 DIAGNOSIS — Z00.00 WELL ADULT EXAM: ICD-10-CM

## 2022-04-20 DIAGNOSIS — R06.09 DYSPNEA ON EXERTION: ICD-10-CM

## 2022-04-20 DIAGNOSIS — I10 ESSENTIAL HYPERTENSION: Primary | ICD-10-CM

## 2022-04-20 DIAGNOSIS — M25.50 ARTHRALGIA, UNSPECIFIED JOINT: ICD-10-CM

## 2022-04-20 LAB — CK SERPL-CCNC: 141 U/L (ref 20–200)

## 2022-04-20 PROCEDURE — 99214 OFFICE O/P EST MOD 30 MIN: CPT | Performed by: INTERNAL MEDICINE

## 2022-04-20 RX ORDER — OMEPRAZOLE 40 MG/1
40 CAPSULE, DELAYED RELEASE ORAL 2 TIMES DAILY
Qty: 180 CAPSULE | Refills: 1 | Status: SHIPPED | OUTPATIENT
Start: 2022-04-20 | End: 2022-10-17

## 2022-04-20 RX ORDER — PRAVASTATIN SODIUM 20 MG
20 TABLET ORAL DAILY
Qty: 90 TABLET | Refills: 1 | Status: SHIPPED | OUTPATIENT
Start: 2022-04-20 | End: 2022-07-27

## 2022-04-20 RX ORDER — DOXAZOSIN 2 MG/1
2 TABLET ORAL NIGHTLY
Qty: 90 TABLET | Refills: 1 | Status: SHIPPED | OUTPATIENT
Start: 2022-04-20 | End: 2022-07-27

## 2022-04-20 NOTE — ASSESSMENT & PLAN NOTE
Patient with increased symptoms as of his 4/22 office visit, and this is despite the increase in his omeprazole time before last.  He has not had any improvement or least not significant improvement stopping some of the supplements we discussed last time.  He had previously noted gallstones, go ahead and repeat ultrasound at this time.

## 2022-04-20 NOTE — PATIENT INSTRUCTIONS
1.  Take 2 of your 10 mg pravastatin at one time until gone, then start the 20 mg tablet.    2.  Doxazosin 2 mg every evening is the an additional blood pressure pill that is being started.    3.  Please call for the results of your gallbladder ultrasound, and let us know at that time if taking the stomach pill twice daily is making any difference.

## 2022-04-20 NOTE — ASSESSMENT & PLAN NOTE
As noted below, patient had echo and stress imaging study in 10/21 with no evidence of ischemia, no CHF.  He had a chest x-ray earlier this year that was very clear.  He had PFTs earlier this month that were well normal, 97 to 101% of predicted.

## 2022-04-20 NOTE — PROGRESS NOTES
"Chief Complaint  Diabetes (Pt is here for routine follow up. Pt noticed a couple of weeks ago his middle finger on both hands has been cramping up.)    Subjective          Marcio Enrique presents to [facility]     History of Present Illness   71-year-old male with underlying hypertension, hyperlipidemia, diabetes, who is coming in 4/22 for routine 4-month follow-up.  We will go over his meds, review his labs in detail, and address any new concerns he may have.    Review of Systems   Constitutional: Negative for appetite change, fatigue and fever.   HENT: Negative for congestion and ear pain.    Eyes: Negative for blurred vision.   Respiratory: Negative for cough, chest tightness, shortness of breath and wheezing.    Cardiovascular: Negative for chest pain, palpitations and leg swelling.   Gastrointestinal: Negative for abdominal pain.   Genitourinary: Negative for difficulty urinating, dysuria and hematuria.   Musculoskeletal: Negative for arthralgias and gait problem.   Skin: Negative for skin lesions.   Neurological: Negative for syncope, memory problem and confusion.   Psychiatric/Behavioral: Negative for self-injury and depressed mood.       Objective   Vital Signs:   /89   Pulse 64   Temp 98.4 °F (36.9 °C)   Ht 198.1 cm (77.99\")   Wt 125 kg (274 lb 12.8 oz)   SpO2 96%   BMI 31.76 kg/m²           Physical Exam  Vitals and nursing note reviewed.   Constitutional:       General: He is not in acute distress.     Appearance: Normal appearance. He is not toxic-appearing.   HENT:      Head: Atraumatic.      Right Ear: External ear normal.      Left Ear: External ear normal.      Nose: Nose normal.      Mouth/Throat:      Mouth: Mucous membranes are moist.   Eyes:      General:         Right eye: No discharge.         Left eye: No discharge.      Extraocular Movements: Extraocular movements intact.      Pupils: Pupils are equal, round, and reactive to light. "   Cardiovascular:      Rate and Rhythm: Normal rate and regular rhythm.      Pulses: Normal pulses.      Heart sounds: Normal heart sounds. No murmur heard.    No gallop.   Pulmonary:      Effort: Pulmonary effort is normal. No respiratory distress.      Breath sounds: No wheezing, rhonchi or rales.   Abdominal:      General: There is no distension.      Palpations: Abdomen is soft. There is no mass.      Tenderness: There is no abdominal tenderness. There is no guarding.   Musculoskeletal:         General: No swelling or tenderness.      Cervical back: No tenderness.      Right lower leg: No edema.      Left lower leg: No edema.   Skin:     General: Skin is warm and dry.      Findings: No rash.   Neurological:      General: No focal deficit present.      Mental Status: He is alert and oriented to person, place, and time. Mental status is at baseline.      Motor: No weakness.      Gait: Gait normal.   Psychiatric:         Mood and Affect: Mood normal.         Thought Content: Thought content normal.          Result Review :   The following data was reviewed by: Aleksandr Olmstead MD on 08/23/2021:                  Assessment and Plan    Diagnoses and all orders for this visit:    1. Essential hypertension (Primary)  Assessment & Plan:  Patient's blood pressure is not optimal as of 4/22 office visit, and he seen some similar readings elsewhere.  He is maxed out on Exforge, and he is on max dose of metoprolol he can tolerate since his heart rate is in the 60s.  We will add low-dose doxazosin in the evening.    Orders:  -     Comprehensive Metabolic Panel; Future    2. Mixed hyperlipidemia  Assessment & Plan:  LDL is trended up to 95 as of 4/22, patient has underlying diabetes, his goal would be less than 70.  We will titrate his pravastatin dose at this time.  We will need to follow-up CK since it bumped with prior statin therapy.    Orders:  -     CK; Future  -     Lipid Panel; Future  -     CK; Future    3. Type 2  diabetes mellitus without complication, without long-term current use of insulin ([facility])  Assessment & Plan:  A1c is trended up from 7.2 to 8.2 as of his 4/22 office visit.  He had previously been very steady [facility] the [facility] for the past 3 years.  He admits to some changes in his diet that may be affecting this, could also be just related to time.  He is only on low-dose metformin, reasonable to continue that for now, but if A1c not improved on return to office, will need to advance it.    Orders:  -     Hemoglobin A1c; Future  -     Microalbumin / Creatinine Urine Ratio - Urine, Clean Catch; Future    4. Well adult exam  -     TSH; Future  -     Hepatitis C Antibody; Future    5. Other fatigue  -     Testosterone, Free, Total; Future    6. Arthralgia, unspecified joint  -     Sedimentation Rate; Future  -     C-reactive protein; Future  -     Uric Acid; Future    7. Gastro-esophageal reflux disease without esophagitis  Assessment & Plan:  Patient with increased symptoms as of his 4/22 office visit, and this is despite the increase in his omeprazole time before last.  He has not had any improvement or least not significant improvement stopping some of the supplements we discussed last time.  He had previously noted gallstones, go ahead and repeat ultrasound at this time.      8. Epigastric pain  -     US Gallbladder; Future    9. Dyspnea on exertion  Overview:  PFTs 4/22:    Spirometry:  No obstructive lung defect noted.  FEV1 is 3.86 L / 101% predicted.  FVC is 4.88 L / 97% predicted.  No bronchodilator response was noted.  Flow volume loop within normal limits.     Lung volumes:  No evidence of restriction, hyperinflation or air trapping.     Diffusion capacity:  Diffuse capacity with normal limits at 82% predicted.     Overall impression:  No evidence of obstruction or restriction.  No bronchodilator response was noted.  Diffuse capacity within normal limits.    Assessment & Plan:  As noted below, patient had  echo and stress imaging study in 10/21 with no evidence of ischemia, no CHF.  He had a chest x-ray earlier this year that was very clear.  He had PFTs earlier this month that were well normal, 97 to 101% of predicted.      Other orders  -     doxazosin (Cardura) 2 MG tablet; Take 1 tablet by mouth Every Night.  Dispense: 90 tablet; Refill: 1  -     pravastatin (PRAVACHOL) 20 MG tablet; Take 1 tablet by mouth Daily.  Dispense: 90 tablet; Refill: 1  -     omeprazole (priLOSEC) 40 MG capsule; Take 1 capsule by mouth 2 (Two) Times a Day.  Dispense: 180 capsule; Refill: 1     --  --  Older notes:  VISIT 4/21:   ANNUAL MEDICARE WELLNESS EXAM 12/20 = reviewed all forms with pt; no new discoveries.  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  --  DM stable on only 500 qd and that is fine...6.3...6.9 and I d/w bad trend...6.9 is holding on 500 qd only... 7.4 and rec 1000 qd again...7.0...7.4 is due to the steroids, and they are being weaned, so no med changes made 9/19...6.9 is back down off steroids...7.5 and will increase on RTO if same/higher = only on 500 qd recall...6.9 on 1000 qAM as of 8/20 OV---> 7.1 is fine 4/21. (TSH neg 4/21).  --  LIPIDS stable 3/14 w/o treatment...elevated CK without statins anyhow...defer... holding steady...122 and will add statin now...78, but CK up and c/o joints at 5/18 OV, so will hold Pravastatin 10 mg for now...86 is fine...96=ok for now---> 84 in 4/20.  --  HTN with need for checks at home before increase Exforge to max dose as of 2/18 OV... BP stable off HCTZ and on toprol...required increase exforge per Karyn; BP great 4/19 OV despite back on Naproxen  past week---> stable 4/21.  --  SYNCOPE 2/18 and to ER for 5 hrs; had neg V/Q; did have aura, so most c/w vasovagal; exam neg; will get ECHO/Dopplers/tilt to complete w/u...ECHO is as below, the Carotids 3/18 were neg, and TILT + for NEUROCARDIOGENIC SYNCOPE=will try to lose HCTZ and add in low dose of toprol; pt to monitor BP and call with results; d/w keep hydrated/heed warning signs...c/o dizziness with rapid head mvmt and rec eval per ENT now...got better on it's own.  ASCENDING AORTIC DILATATION=3.8 cm per ECHO 3/18 (d/w nl 3.5 or less, dilated 3.5 to 4.5, aneurysmal is above 4.4, and surgery above 5.5).  --  DJD with L knee/shoulder requiring MRI's...increased sxs 5/18, so holding statin...c/o knee again 12/18, but doesn't sound severe; OTC meds for now...I was unaware of bump in BP, so I started Naprosyn back a week ago...on prednisone as of 6/19 OV with Dr Mcgovern following now...ESR down 86 to 9 on prednisone as of 9/19 OV...still being weaned as of 12/19 OV and I d/w add tylenol to help offset the prednisone wean.  --  H PYLORI +...d/w tx plan with prilosec 20 mg/amoxil 1000mg/biaxin 500 mg all bid for 14 days...tolerated this.  GERD/BURPPING was better when he was on PPI for above, so try qod...no c/o 10/17.  GALLSTONES noted per below eval and will eval if persistent.  R ABD PAIN to ER, was nauseated, CT=? liver cysts and L renal lesion, he's concerned, so will get MRI 2/16... MRI 3/17 is with stable cysts.   --  BPH with intol to cardura prior, slow stream but no other sx's...defer for now.   --  --  PSA 1.8 (4/2/21)  COLON 12/12...normal...Dr Sutherland...5 yrs given FH+ x 2...d/w 10/17 OV---> d/w again 6/19, but now says neg FH 6/19??? = rec cologuard=neg 7/19.  PNEUMOVAX #1 12/13, Prevnar '16; rec Hep A 5/18; COVID x2 as of 4/21.  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    Follow Up   Return in about 3 months (around 7/20/2022).  Patient was given instructions and counseling regarding his condition or for health maintenance advice. Please see specific information pulled into the AVS if appropriate.

## 2022-04-20 NOTE — ASSESSMENT & PLAN NOTE
A1c is trended up from 7.2 to 8.2 as of his 4/22 office visit.  He had previously been very steady in the low 7 ballpark for the past 3 years.  He admits to some changes in his diet that may be affecting this, could also be just related to time.  He is only on low-dose metformin, reasonable to continue that for now, but if A1c not improved on return to office, will need to advance it.

## 2022-04-20 NOTE — ASSESSMENT & PLAN NOTE
Patient's blood pressure is not optimal as of 4/22 office visit, and he seen some similar readings elsewhere.  He is maxed out on Exforge, and he is on max dose of metoprolol he can tolerate since his heart rate is in the 60s.  We will add low-dose doxazosin in the evening.

## 2022-04-20 NOTE — ASSESSMENT & PLAN NOTE
LDL is trended up to 95 as of 4/22, patient has underlying diabetes, his goal would be less than 70.  We will titrate his pravastatin dose at this time.  We will need to follow-up CK since it bumped with prior statin therapy.

## 2022-05-19 ENCOUNTER — HOSPITAL ENCOUNTER (OUTPATIENT)
Dept: ULTRASOUND IMAGING | Facility: HOSPITAL | Age: 72
Discharge: HOME | End: 2022-05-19
Admitting: INTERNAL MEDICINE

## 2022-05-19 DIAGNOSIS — R10.13 EPIGASTRIC PAIN: ICD-10-CM

## 2022-05-19 PROCEDURE — 76705 ECHO EXAM OF ABDOMEN: CPT

## 2022-06-06 RX ORDER — METFORMIN HYDROCHLORIDE 500 MG/1
1000 TABLET, EXTENDED RELEASE ORAL
Qty: 180 TABLET | Refills: 1 | Status: SHIPPED | OUTPATIENT
Start: 2022-06-06 | End: 2022-11-30

## 2022-07-13 ENCOUNTER — LAB (OUTPATIENT)
Dept: LAB | Facility: HOSPITAL | Age: 72
End: 2022-07-13

## 2022-07-13 DIAGNOSIS — E78.2 MIXED HYPERLIPIDEMIA: ICD-10-CM

## 2022-07-13 DIAGNOSIS — Z00.00 WELL ADULT EXAM: ICD-10-CM

## 2022-07-13 DIAGNOSIS — E11.9 TYPE 2 DIABETES MELLITUS WITHOUT COMPLICATION, WITHOUT LONG-TERM CURRENT USE OF INSULIN: ICD-10-CM

## 2022-07-13 DIAGNOSIS — I10 ESSENTIAL HYPERTENSION: ICD-10-CM

## 2022-07-13 DIAGNOSIS — M25.50 ARTHRALGIA, UNSPECIFIED JOINT: ICD-10-CM

## 2022-07-13 DIAGNOSIS — R53.83 OTHER FATIGUE: ICD-10-CM

## 2022-07-13 LAB
ALBUMIN SERPL-MCNC: 4.3 G/DL (ref 3.5–5.2)
ALBUMIN UR-MCNC: 5.6 MG/DL
ALBUMIN/GLOB SERPL: 1.9 G/DL
ALP SERPL-CCNC: 75 U/L (ref 39–117)
ALT SERPL W P-5'-P-CCNC: 14 U/L (ref 1–41)
ANION GAP SERPL CALCULATED.3IONS-SCNC: 9.5 MMOL/L (ref 5–15)
AST SERPL-CCNC: 18 U/L (ref 1–40)
BILIRUB SERPL-MCNC: 0.3 MG/DL (ref 0–1.2)
BUN SERPL-MCNC: 11 MG/DL (ref 8–23)
BUN/CREAT SERPL: 9.9 (ref 7–25)
CALCIUM SPEC-SCNC: 9.5 MG/DL (ref 8.6–10.5)
CHLORIDE SERPL-SCNC: 106 MMOL/L (ref 98–107)
CHOLEST SERPL-MCNC: 126 MG/DL (ref 0–200)
CK SERPL-CCNC: 236 U/L (ref 20–200)
CO2 SERPL-SCNC: 26.5 MMOL/L (ref 22–29)
CREAT SERPL-MCNC: 1.11 MG/DL (ref 0.76–1.27)
CREAT UR-MCNC: 84.7 MG/DL
CRP SERPL-MCNC: <0.3 MG/DL (ref 0–0.5)
EGFRCR SERPLBLD CKD-EPI 2021: 69.7 ML/MIN/1.73
ERYTHROCYTE [SEDIMENTATION RATE] IN BLOOD: 9 MM/HR (ref 0–20)
GLOBULIN UR ELPH-MCNC: 2.3 GM/DL
GLUCOSE SERPL-MCNC: 136 MG/DL (ref 65–99)
HBA1C MFR BLD: 7 % (ref 4.8–5.6)
HCV AB SER DONR QL: NORMAL
HDLC SERPL-MCNC: 50 MG/DL (ref 40–60)
LDLC SERPL CALC-MCNC: 55 MG/DL (ref 0–100)
LDLC/HDLC SERPL: 1.04 {RATIO}
MICROALBUMIN/CREAT UR: 66.1 MG/G
POTASSIUM SERPL-SCNC: 4.3 MMOL/L (ref 3.5–5.2)
PROT SERPL-MCNC: 6.6 G/DL (ref 6–8.5)
SODIUM SERPL-SCNC: 142 MMOL/L (ref 136–145)
TRIGL SERPL-MCNC: 119 MG/DL (ref 0–150)
TSH SERPL DL<=0.05 MIU/L-ACNC: 2.2 UIU/ML (ref 0.27–4.2)
URATE SERPL-MCNC: 4.6 MG/DL (ref 3.4–7)
VLDLC SERPL-MCNC: 21 MG/DL (ref 5–40)

## 2022-07-13 PROCEDURE — 86140 C-REACTIVE PROTEIN: CPT

## 2022-07-13 PROCEDURE — 84402 ASSAY OF FREE TESTOSTERONE: CPT

## 2022-07-13 PROCEDURE — 82043 UR ALBUMIN QUANTITATIVE: CPT

## 2022-07-13 PROCEDURE — 85652 RBC SED RATE AUTOMATED: CPT

## 2022-07-13 PROCEDURE — 36415 COLL VENOUS BLD VENIPUNCTURE: CPT

## 2022-07-13 PROCEDURE — 80053 COMPREHEN METABOLIC PANEL: CPT

## 2022-07-13 PROCEDURE — 83036 HEMOGLOBIN GLYCOSYLATED A1C: CPT

## 2022-07-13 PROCEDURE — 86803 HEPATITIS C AB TEST: CPT

## 2022-07-13 PROCEDURE — 84443 ASSAY THYROID STIM HORMONE: CPT

## 2022-07-13 PROCEDURE — 80061 LIPID PANEL: CPT

## 2022-07-13 PROCEDURE — 84550 ASSAY OF BLOOD/URIC ACID: CPT

## 2022-07-13 PROCEDURE — 84403 ASSAY OF TOTAL TESTOSTERONE: CPT

## 2022-07-13 PROCEDURE — 82570 ASSAY OF URINE CREATININE: CPT

## 2022-07-13 PROCEDURE — 82550 ASSAY OF CK (CPK): CPT

## 2022-07-17 LAB
TESTOST FREE SERPL-MCNC: 7.5 PG/ML (ref 6.6–18.1)
TESTOST SERPL-MCNC: 365 NG/DL (ref 264–916)

## 2022-07-20 ENCOUNTER — OFFICE VISIT (OUTPATIENT)
Dept: INTERNAL MEDICINE | Facility: CLINIC | Age: 72
End: 2022-07-20

## 2022-07-20 VITALS
BODY MASS INDEX: 30.87 KG/M2 | DIASTOLIC BLOOD PRESSURE: 91 MMHG | HEIGHT: 78 IN | OXYGEN SATURATION: 97 % | SYSTOLIC BLOOD PRESSURE: 160 MMHG | WEIGHT: 266.8 LBS | HEART RATE: 64 BPM | TEMPERATURE: 97.5 F

## 2022-07-20 DIAGNOSIS — D50.8 IRON DEFICIENCY ANEMIA SECONDARY TO INADEQUATE DIETARY IRON INTAKE: ICD-10-CM

## 2022-07-20 DIAGNOSIS — Z12.11 COLON CANCER SCREENING: ICD-10-CM

## 2022-07-20 DIAGNOSIS — I10 ESSENTIAL HYPERTENSION: Primary | ICD-10-CM

## 2022-07-20 DIAGNOSIS — E11.9 TYPE 2 DIABETES MELLITUS WITHOUT COMPLICATION, WITHOUT LONG-TERM CURRENT USE OF INSULIN: ICD-10-CM

## 2022-07-20 DIAGNOSIS — E78.2 MIXED HYPERLIPIDEMIA: ICD-10-CM

## 2022-07-20 DIAGNOSIS — K21.9 GASTRO-ESOPHAGEAL REFLUX DISEASE WITHOUT ESOPHAGITIS: ICD-10-CM

## 2022-07-20 PROBLEM — Z12.5 SCREENING FOR MALIGNANT NEOPLASM OF PROSTATE: Status: RESOLVED | Noted: 2021-07-29 | Resolved: 2022-07-20

## 2022-07-20 PROCEDURE — 99214 OFFICE O/P EST MOD 30 MIN: CPT | Performed by: INTERNAL MEDICINE

## 2022-07-20 PROCEDURE — 90471 IMMUNIZATION ADMIN: CPT | Performed by: INTERNAL MEDICINE

## 2022-07-20 PROCEDURE — 90714 TD VACC NO PRESV 7 YRS+ IM: CPT | Performed by: INTERNAL MEDICINE

## 2022-07-20 NOTE — ASSESSMENT & PLAN NOTE
Patient no dysphagia but still with occasional dyspepsia as of 7/22 office visit.  He is stable to continue with twice daily PPI.

## 2022-07-20 NOTE — ASSESSMENT & PLAN NOTE
A1c is down from 8.2 to 7.0 as of his 7/22 office visit.  This was without any changes in his medical regimen, he is just been cleaning up his diet some.  He certainly stable to continue with low-dose metformin.  Follow-up return to office in the Fall.

## 2022-07-20 NOTE — ASSESSMENT & PLAN NOTE
LDL is down nicely from 95 to 55 as of his 7/22 office visit.  This was after titrating from 10 to 20 mg of pravastatin.  This is certainly very stable, continue same dose.  Of note, his CK is stable right around 200, we will keep an eye on this as well.

## 2022-07-20 NOTE — ASSESSMENT & PLAN NOTE
Patient blood pressures not optimal again as of his 7/22 office visit, but he did not take his medications this morning.  Asked him to keep an eye on his blood pressure either at home or at the stores more frequently and let me know if it is elevated even when taking meds as prescribed.  Otherwise patient to continue with full dose of amlodipine/valsartan, low-dose of doxazosin, and low-dose metoprolol.  Of note if blood pressure remains elevated, we will have to titrate the doxazosin, because his heart rate is right around 60.

## 2022-07-20 NOTE — PATIENT INSTRUCTIONS
Shingrix is the name of the shingles vaccine to look for, you can get this at any pharmacy, you do not need an order.  You want 2 doses over about a 3-month timeframe.

## 2022-07-20 NOTE — PROGRESS NOTES
"Chief Complaint  GI Problem (Pt is here for routine follow up. Pt has noticed a rash above his left eye that he would like a cream for.)    Subjective          Marcio Enrique presents to [facility]     History of Present Illness   71-year-old male with underlying hypertension, hyperlipidemia, diabetes, who is coming in 7/22 for routine 4-month follow-up.  We will go over his meds, review his labs in detail, and address any new concerns he may have.    Review of Systems   Constitutional: Negative for appetite change, fatigue and fever.   HENT: Negative for congestion and ear pain.    Eyes: Negative for blurred vision.   Respiratory: Negative for cough, chest tightness, shortness of breath and wheezing.    Cardiovascular: Negative for chest pain, palpitations and leg swelling.   Gastrointestinal: Negative for abdominal pain.   Genitourinary: Negative for difficulty urinating, dysuria and hematuria.   Musculoskeletal: Negative for arthralgias and gait problem.   Skin: Negative for skin lesions.   Neurological: Negative for syncope, memory problem and confusion.   Psychiatric/Behavioral: Negative for self-injury and depressed mood.       Objective   Vital Signs:   /91   Pulse 64   Temp 97.5 °F (36.4 °C)   Ht 198.1 cm (77.99\")   Wt 121 kg (266 lb 12.8 oz)   SpO2 97%   BMI 30.84 kg/m²           Physical Exam  Vitals and nursing note reviewed.   Constitutional:       General: He is not in acute distress.     Appearance: Normal appearance. He is not toxic-appearing.   HENT:      Head: Atraumatic.      Right Ear: External ear normal.      Left Ear: External ear normal.      Nose: Nose normal.      Mouth/Throat:      Mouth: Mucous membranes are moist.   Eyes:      General:         Right eye: No discharge.         Left eye: No discharge.      Extraocular Movements: Extraocular movements intact.      Pupils: Pupils are equal, round, and reactive to light.   Cardiovascular:      Rate " and Rhythm: Normal rate and regular rhythm.      Pulses: Normal pulses.      Heart sounds: Normal heart sounds. No murmur heard.    No gallop.   Pulmonary:      Effort: Pulmonary effort is normal. No respiratory distress.      Breath sounds: No wheezing, rhonchi or rales.   Abdominal:      General: There is no distension.      Palpations: Abdomen is soft. There is no mass.      Tenderness: There is no abdominal tenderness. There is no guarding.   Musculoskeletal:         General: No swelling or tenderness.      Cervical back: No tenderness.      Right lower leg: No edema.      Left lower leg: No edema.   Skin:     General: Skin is warm and dry.      Findings: No rash.   Neurological:      General: No focal deficit present.      Mental Status: He is alert and oriented to person, place, and time. Mental status is at baseline.      Motor: No weakness.      Gait: Gait normal.   Psychiatric:         Mood and Affect: Mood normal.         Thought Content: Thought content normal.          Result Review :   The following data was reviewed by: Aleksandr Olmstead MD on 08/23/2021:                  Assessment and Plan    Diagnoses and all orders for this visit:    1. Essential hypertension (Primary)  Assessment & Plan:  Patient blood pressures not optimal again as of his 7/22 office visit, but he did not take his medications this morning.  Asked him to keep an eye on his blood pressure either at home or at the stores more frequently and let me know if it is elevated even when taking meds as prescribed.  Otherwise patient to continue with full dose of amlodipine/valsartan, low-dose of doxazosin, and low-dose metoprolol.  Of note if blood pressure remains elevated, we will have to titrate the doxazosin, because his heart rate is right around 60.    Orders:  -     Comprehensive Metabolic Panel; Future    2. Mixed hyperlipidemia  Assessment & Plan:  LDL is down nicely from 95 to 55 as of his 7/22 office visit.  This was after titrating  from 10 to 20 mg of pravastatin.  This is certainly very stable, continue same dose.  Of note, his CK is stable right around 200, we will keep an eye on this as well.    Orders:  -     Lipid Panel; Future  -     CK; Future    3. Type 2 diabetes mellitus without complication, without long-term current use of insulin (HCC)  Assessment & Plan:  A1c is down from 8.2 to 7.0 as of his 7/22 office visit.  This was without any changes in his medical regimen, he is just been cleaning up his diet some.  He certainly stable to continue with low-dose metformin.  Follow-up return to office in the Fall.     Orders:  -     Hemoglobin A1c; Future    4. Iron deficiency anemia secondary to inadequate dietary iron intake  -     CBC & Differential; Future  -     Iron Profile; Future  -     Ferritin; Future    5. Gastro-esophageal reflux disease without esophagitis  Assessment & Plan:  Patient no dysphagia but still with occasional dyspepsia as of 7/22 office visit.  He is stable to continue with twice daily PPI.      6. Colon cancer screening  -     Cologuard - Stool, Per Rectum; Future    Other orders  -     Td (adult) Vaccine Not Adsorbed     --  --  Older notes:  VISIT 4/21:   ANNUAL MEDICARE WELLNESS EXAM 12/20 = reviewed all forms with pt; no new discoveries.  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  --  DM stable on only 500 qd and that is fine...6.3...6.9 and I d/w bad trend...6.9 is holding on 500 qd only... 7.4 and rec 1000 qd again...7.0...7.4 is due to the steroids, and they are being weaned, so no med changes made 9/19...6.9 is back down off steroids...7.5 and will increase on RTO if  same/higher = only on 500 qd recall...6.9 on 1000 qAM as of 8/20 OV---> 7.1 is fine 4/21. (TSH neg 4/21).  --  LIPIDS stable 3/14 w/o treatment...elevated CK without statins anyhow...defer... holding steady...122 and will add statin now...78, but CK up and c/o joints at 5/18 OV, so will hold Pravastatin 10 mg for now...86 is fine...96=ok for now---> 84 in 4/20.  --  HTN with need for checks at home before increase Exforge to max dose as of 2/18 OV... BP stable off HCTZ and on toprol...required increase exforge per Karyn; BP great 4/19 OV despite back on Naproxen past week---> stable 4/21.  --  SYNCOPE 2/18 and to ER for 5 hrs; had neg V/Q; did have aura, so most c/w vasovagal; exam neg; will get ECHO/Dopplers/tilt to complete w/u...ECHO is as below, the Carotids 3/18 were neg, and TILT + for NEUROCARDIOGENIC SYNCOPE=will try to lose HCTZ and add in low dose of toprol; pt to monitor BP and call with results; d/w keep hydrated/heed warning signs...c/o dizziness with rapid head mvmt and rec eval per ENT now...got better on it's own.  ASCENDING AORTIC DILATATION=3.8 cm per ECHO 3/18 (d/w nl 3.5 or less, dilated 3.5 to 4.5, aneurysmal is above 4.4, and surgery above 5.5).  --  DJD with L knee/shoulder requiring MRI's...increased sxs 5/18, so holding statin...c/o knee again 12/18, but doesn't sound severe; OTC meds for now...I was unaware of bump in BP, so I started Naprosyn back a week ago...on prednisone as of 6/19 OV with Dr Mcgovern following now...ESR down 86 to 9 on prednisone as of 9/19 OV...still being weaned as of 12/19 OV and I d/w add tylenol to help offset the prednisone wean.  --  H PYLORI +...d/w tx plan with prilosec 20 mg/amoxil 1000mg/biaxin 500 mg all bid for 14 days...tolerated this.  GERD/BURPPING was better when he was on PPI for above, so try qod...no c/o 10/17.  GALLSTONES noted per below eval and will eval if persistent.  R ABD PAIN to ER, was nauseated, CT=? liver cysts and L renal lesion,  he's concerned, so will get MRI 2/16... MRI 3/17 is with stable cysts.   --  BPH with intol to cardura prior, slow stream but no other sx's...defer for now.   --  --  PSA 1.8 (4/2/21)  COLON 12/12...normal...Dr Sutherland...5 yrs given FH+ x 2...d/w 10/17 OV---> d/w again 6/19, but now says neg FH 6/19??? = rec cologuard=neg 7/19---> order placed 7/22.  PNEUMOVAX #1 12/13, Prevnar '16; rec Hep A 5/18; COVID x2 as of 4/21.  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    Follow Up   Return in about 4 months (around 11/20/2022).  Patient was given instructions and counseling regarding his condition or for health maintenance advice. Please see specific information pulled into the AVS if appropriate.

## 2022-07-25 RX ORDER — AMLODIPINE AND VALSARTAN 10; 320 MG/1; MG/1
TABLET ORAL
Qty: 90 TABLET | Refills: 1 | Status: SHIPPED | OUTPATIENT
Start: 2022-07-25 | End: 2022-11-14

## 2022-07-27 RX ORDER — DOXAZOSIN 2 MG/1
TABLET ORAL
Qty: 90 TABLET | Refills: 1 | Status: SHIPPED | OUTPATIENT
Start: 2022-07-27 | End: 2022-12-27

## 2022-07-27 RX ORDER — PRAVASTATIN SODIUM 20 MG
TABLET ORAL
Qty: 90 TABLET | Refills: 1 | Status: SHIPPED | OUTPATIENT
Start: 2022-07-27

## 2022-08-02 RX ORDER — METOPROLOL SUCCINATE 25 MG/1
TABLET, EXTENDED RELEASE ORAL
Qty: 45 TABLET | Refills: 0 | Status: SHIPPED | OUTPATIENT
Start: 2022-08-02 | End: 2022-10-28

## 2022-08-19 ENCOUNTER — TELEPHONE (OUTPATIENT)
Dept: INTERNAL MEDICINE | Facility: CLINIC | Age: 72
End: 2022-08-19

## 2022-08-22 ENCOUNTER — OFFICE VISIT (OUTPATIENT)
Dept: INTERNAL MEDICINE | Facility: CLINIC | Age: 72
End: 2022-08-22

## 2022-08-22 VITALS
OXYGEN SATURATION: 98 % | BODY MASS INDEX: 30.82 KG/M2 | DIASTOLIC BLOOD PRESSURE: 84 MMHG | SYSTOLIC BLOOD PRESSURE: 138 MMHG | WEIGHT: 266.4 LBS | TEMPERATURE: 97 F | HEART RATE: 54 BPM | HEIGHT: 78 IN

## 2022-08-22 DIAGNOSIS — L21.9 SEBORRHEIC DERMATITIS: ICD-10-CM

## 2022-08-22 DIAGNOSIS — M15.9 PRIMARY OSTEOARTHRITIS INVOLVING MULTIPLE JOINTS: ICD-10-CM

## 2022-08-22 DIAGNOSIS — I10 ESSENTIAL HYPERTENSION: Primary | ICD-10-CM

## 2022-08-22 PROCEDURE — 99213 OFFICE O/P EST LOW 20 MIN: CPT | Performed by: INTERNAL MEDICINE

## 2022-08-22 RX ORDER — KETOCONAZOLE 20 MG/G
1 CREAM TOPICAL 2 TIMES DAILY
Qty: 30 G | Refills: 1 | Status: SHIPPED | OUTPATIENT
Start: 2022-08-22

## 2022-08-22 NOTE — ASSESSMENT & PLAN NOTE
Patient with a flare of this as of his 8/22 urgent visit.  Sounds like we have treated this in the past, its been a while, but patient reports symptoms consistent with this for quite some time, finally would like to get back on the treatment for it.  Prescription for ketoconazole cream sent to his pharmacy, patient to call if not benefiting from it.

## 2022-08-22 NOTE — PROGRESS NOTES
"Chief Complaint  Rash (Pt has a scaly rash on the right outside of his nose. /He has arthritis in both of his middle fingers and they draw up.)    Subjective          Marcio Enrique presents to [facility]     Rash  Pertinent negatives include no congestion, cough, fatigue, fever or shortness of breath.      History of present illness:  71-year-old male with underlying hypertension, hyperlipidemia, diabetes, who is coming in 7/22 for routine 4-month follow-up.  We will go over his meds, review his labs in detail, and address any new concerns he may have.---> Patient here for urgent visit as per chief complaint above.    Review of Systems   Constitutional: Negative for appetite change, fatigue and fever.   HENT: Negative for congestion and ear pain.    Eyes: Negative for blurred vision.   Respiratory: Negative for cough, chest tightness, shortness of breath and wheezing.    Cardiovascular: Negative for chest pain, palpitations and leg swelling.   Gastrointestinal: Negative for abdominal pain.   Genitourinary: Negative for difficulty urinating, dysuria and hematuria.   Musculoskeletal: Negative for arthralgias and gait problem.   Skin: Positive for rash. Negative for skin lesions.   Neurological: Negative for syncope, memory problem and confusion.   Psychiatric/Behavioral: Negative for self-injury and depressed mood.       Objective   Vital Signs:   /84 (BP Location: Left arm, Patient Position: Sitting, Cuff Size: Large Adult)   Pulse 54   Temp 97 °F (36.1 °C)   Ht 198.1 cm (77.99\")   Wt 121 kg (266 lb 6.4 oz)   SpO2 98%   BMI 30.79 kg/m²           Physical Exam  Vitals and nursing note reviewed.   Constitutional:       General: He is not in acute distress.     Appearance: Normal appearance. He is not toxic-appearing.   HENT:      Head: Atraumatic.      Right Ear: External ear normal.      Left Ear: External ear normal.      Nose: Nose normal.      Mouth/Throat:      Mouth: " Mucous membranes are moist.   Eyes:      General:         Right eye: No discharge.         Left eye: No discharge.      Extraocular Movements: Extraocular movements intact.      Pupils: Pupils are equal, round, and reactive to light.   Cardiovascular:      Rate and Rhythm: Normal rate and regular rhythm.      Pulses: Normal pulses.      Heart sounds: Normal heart sounds. No murmur heard.    No gallop.   Pulmonary:      Effort: Pulmonary effort is normal. No respiratory distress.      Breath sounds: No wheezing, rhonchi or rales.   Abdominal:      General: There is no distension.      Palpations: Abdomen is soft. There is no mass.      Tenderness: There is no abdominal tenderness. There is no guarding.   Musculoskeletal:         General: No swelling or tenderness.      Cervical back: No tenderness.      Right lower leg: No edema.      Left lower leg: No edema.   Skin:     General: Skin is warm and dry.      Findings: No rash.   Neurological:      General: No focal deficit present.      Mental Status: He is alert and oriented to person, place, and time. Mental status is at baseline.      Motor: No weakness.      Gait: Gait normal.   Psychiatric:         Mood and Affect: Mood normal.         Thought Content: Thought content normal.          Result Review :   The following data was reviewed by: Aleksandr Olmstead MD on 08/23/2021:                  Assessment and Plan    Diagnoses and all orders for this visit:    1. Essential hypertension (Primary)  Assessment & Plan:  Blood pressure with better control as of his 8/22 urgent visit.  He had missed a dose at the time of his last visit.  He stable to continue with full dose amlodipine/valsartan as well as low doses of doxazosin and metoprolol.      2. Primary osteoarthritis involving multiple joints  Assessment & Plan:  Patient with issues in his middle fingers more so than others as of his 8/22 office visit.  He is having cramping in them in the evening, not exactly true  trigger finger.  He does have access to a lot of the Voltaren gel, discussed with him that it would be a reasonable thing to try, certainly safer than taken chronic oral nonsteroidals.  If symptoms progress, discussed with patient we could have Ortho see him in regards to injections.      3. Seborrheic dermatitis  Assessment & Plan:  Patient with a flare of this as of his 8/22 urgent visit.  Sounds like we have treated this in the past, its been a while, but patient reports symptoms consistent with this for quite some time, finally would like to get back on the treatment for it.  Prescription for ketoconazole cream sent to his pharmacy, patient to call if not benefiting from it.      Other orders  -     ketoconazole (NIZORAL) 2 % cream; Apply 1 application topically to the appropriate area as directed 2 (Two) Times a Day.  Dispense: 30 g; Refill: 1     --  --  Older notes:  VISIT 4/21:   ANNUAL MEDICARE WELLNESS EXAM 12/20 = reviewed all forms with pt; no new discoveries.  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  --  DM stable on only 500 qd and that is fine...6.3...6.9 and I d/w bad trend...6.9 is holding on 500 qd only... 7.4 and rec 1000 qd again...7.0...7.4 is due to the steroids, and they are being weaned, so no med changes made 9/19...6.9 is back down off steroids...7.5 and will increase on RTO if same/higher = only on 500 qd recall...6.9 on 1000 qAM as of 8/20 OV---> 7.1 is fine 4/21. (TSH neg 4/21).  --  LIPIDS stable 3/14 w/o treatment...elevated CK without statins anyhow...defer... holding steady...122 and will add statin now...78, but CK up and c/o joints at 5/18 OV,  so will hold Pravastatin 10 mg for now...86 is fine...96=ok for now---> 84 in 4/20.  --  HTN with need for checks at home before increase Exforge to max dose as of 2/18 OV... BP stable off HCTZ and on toprol...required increase exforge per Karyn; BP great 4/19 OV despite back on Naproxen past week---> stable 4/21.  --  SYNCOPE 2/18 and to ER for 5 hrs; had neg V/Q; did have aura, so most c/w vasovagal; exam neg; will get ECHO/Dopplers/tilt to complete w/u...ECHO is as below, the Carotids 3/18 were neg, and TILT + for NEUROCARDIOGENIC SYNCOPE=will try to lose HCTZ and add in low dose of toprol; pt to monitor BP and call with results; d/w keep hydrated/heed warning signs...c/o dizziness with rapid head mvmt and rec eval per ENT now...got better on it's own.  ASCENDING AORTIC DILATATION=3.8 cm per ECHO 3/18 (d/w nl 3.5 or less, dilated 3.5 to 4.5, aneurysmal is above 4.4, and surgery above 5.5).  --  DJD with L knee/shoulder requiring MRI's...increased sxs 5/18, so holding statin...c/o knee again 12/18, but doesn't sound severe; OTC meds for now...I was unaware of bump in BP, so I started Naprosyn back a week ago...on prednisone as of 6/19 OV with Dr Mcgovern following now...ESR down 86 to 9 on prednisone as of 9/19 OV...still being weaned as of 12/19 OV and I d/w add tylenol to help offset the prednisone wean.  --  H PYLORI +...d/w tx plan with prilosec 20 mg/amoxil 1000mg/biaxin 500 mg all bid for 14 days...tolerated this.  GERD/BURPPING was better when he was on PPI for above, so try qod...no c/o 10/17.  GALLSTONES noted per below eval and will eval if persistent.  R ABD PAIN to ER, was nauseated, CT=? liver cysts and L renal lesion, he's concerned, so will get MRI 2/16... MRI 3/17 is with stable cysts.   --  BPH with intol to cardura prior, slow stream but no other sx's...defer for now.   --  --  PSA 1.8 (4/2/21)  COLON 12/12...normal...Dr Sutherland...5 yrs given FH+ x 2...d/w 10/17 OV---> d/w again 6/19, but now says  neg FH 6/19??? = rec cologuard=neg 7/19---> order placed 7/22.  PNEUMOVAX #1 12/13, Prevnar '16; rec Hep A 5/18; COVID x2 as of 4/21.  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    Follow Up   Return for Next scheduled follow up.  Patient was given instructions and counseling regarding his condition or for health maintenance advice. Please see specific information pulled into the AVS if appropriate.

## 2022-08-22 NOTE — ASSESSMENT & PLAN NOTE
Blood pressure with better control as of his 8/22 urgent visit.  He had missed a dose at the time of his last visit.  He stable to continue with full dose amlodipine/valsartan as well as low doses of doxazosin and metoprolol.

## 2022-08-22 NOTE — ASSESSMENT & PLAN NOTE
Patient with issues in his middle fingers more so than others as of his 8/22 office visit.  He is having cramping in them in the evening, not exactly true trigger finger.  He does have access to a lot of the Voltaren gel, discussed with him that it would be a reasonable thing to try, certainly safer than taken chronic oral nonsteroidals.  If symptoms progress, discussed with patient we could have Ortho see him in regards to injections.

## 2022-10-17 RX ORDER — PRAVASTATIN SODIUM 10 MG
TABLET ORAL
Qty: 90 TABLET | Refills: 1 | Status: SHIPPED | OUTPATIENT
Start: 2022-10-17 | End: 2022-11-21 | Stop reason: SDUPTHER

## 2022-10-17 RX ORDER — OMEPRAZOLE 40 MG/1
CAPSULE, DELAYED RELEASE ORAL
Qty: 180 CAPSULE | Refills: 1 | Status: SHIPPED | OUTPATIENT
Start: 2022-10-17 | End: 2023-01-05

## 2022-10-28 RX ORDER — METOPROLOL SUCCINATE 25 MG/1
TABLET, EXTENDED RELEASE ORAL
Qty: 45 TABLET | Refills: 0 | Status: SHIPPED | OUTPATIENT
Start: 2022-10-28 | End: 2023-01-05

## 2022-11-14 RX ORDER — AMLODIPINE AND VALSARTAN 10; 320 MG/1; MG/1
TABLET ORAL
Qty: 90 TABLET | Refills: 1 | Status: SHIPPED | OUTPATIENT
Start: 2022-11-14

## 2022-11-19 ENCOUNTER — LAB (OUTPATIENT)
Dept: LAB | Facility: HOSPITAL | Age: 72
End: 2022-11-19

## 2022-11-19 DIAGNOSIS — I10 ESSENTIAL HYPERTENSION: ICD-10-CM

## 2022-11-19 DIAGNOSIS — D50.8 IRON DEFICIENCY ANEMIA SECONDARY TO INADEQUATE DIETARY IRON INTAKE: ICD-10-CM

## 2022-11-19 DIAGNOSIS — E11.9 TYPE 2 DIABETES MELLITUS WITHOUT COMPLICATION, WITHOUT LONG-TERM CURRENT USE OF INSULIN: ICD-10-CM

## 2022-11-19 DIAGNOSIS — E78.2 MIXED HYPERLIPIDEMIA: ICD-10-CM

## 2022-11-19 LAB
ALBUMIN SERPL-MCNC: 4.2 G/DL (ref 3.5–5.2)
ALBUMIN/GLOB SERPL: 1.6 G/DL
ALP SERPL-CCNC: 79 U/L (ref 39–117)
ALT SERPL W P-5'-P-CCNC: 20 U/L (ref 1–41)
ANION GAP SERPL CALCULATED.3IONS-SCNC: 11.3 MMOL/L (ref 5–15)
AST SERPL-CCNC: 17 U/L (ref 1–40)
BASOPHILS # BLD AUTO: 0.03 10*3/MM3 (ref 0–0.2)
BASOPHILS NFR BLD AUTO: 0.4 % (ref 0–1.5)
BILIRUB SERPL-MCNC: <0.2 MG/DL (ref 0–1.2)
BUN SERPL-MCNC: 15 MG/DL (ref 8–23)
BUN/CREAT SERPL: 12.2 (ref 7–25)
CALCIUM SPEC-SCNC: 10 MG/DL (ref 8.6–10.5)
CHLORIDE SERPL-SCNC: 106 MMOL/L (ref 98–107)
CHOLEST SERPL-MCNC: 140 MG/DL (ref 0–200)
CK SERPL-CCNC: 160 U/L (ref 20–200)
CO2 SERPL-SCNC: 26.7 MMOL/L (ref 22–29)
CREAT SERPL-MCNC: 1.23 MG/DL (ref 0.76–1.27)
DEPRECATED RDW RBC AUTO: 42.3 FL (ref 37–54)
EGFRCR SERPLBLD CKD-EPI 2021: 61.6 ML/MIN/1.73
EOSINOPHIL # BLD AUTO: 0.29 10*3/MM3 (ref 0–0.4)
EOSINOPHIL NFR BLD AUTO: 4.2 % (ref 0.3–6.2)
ERYTHROCYTE [DISTWIDTH] IN BLOOD BY AUTOMATED COUNT: 12.7 % (ref 12.3–15.4)
FERRITIN SERPL-MCNC: 210 NG/ML (ref 30–400)
GLOBULIN UR ELPH-MCNC: 2.7 GM/DL
GLUCOSE SERPL-MCNC: 142 MG/DL (ref 65–99)
HBA1C MFR BLD: 6.9 % (ref 4.8–5.6)
HCT VFR BLD AUTO: 39.1 % (ref 37.5–51)
HDLC SERPL-MCNC: 51 MG/DL (ref 40–60)
HGB BLD-MCNC: 13.2 G/DL (ref 13–17.7)
IMM GRANULOCYTES # BLD AUTO: 0.01 10*3/MM3 (ref 0–0.05)
IMM GRANULOCYTES NFR BLD AUTO: 0.1 % (ref 0–0.5)
IRON 24H UR-MRATE: 70 MCG/DL (ref 59–158)
IRON SATN MFR SERPL: 20 % (ref 20–50)
LDLC SERPL CALC-MCNC: 70 MG/DL (ref 0–100)
LDLC/HDLC SERPL: 1.35 {RATIO}
LYMPHOCYTES # BLD AUTO: 2.75 10*3/MM3 (ref 0.7–3.1)
LYMPHOCYTES NFR BLD AUTO: 39.5 % (ref 19.6–45.3)
MCH RBC QN AUTO: 30.5 PG (ref 26.6–33)
MCHC RBC AUTO-ENTMCNC: 33.8 G/DL (ref 31.5–35.7)
MCV RBC AUTO: 90.3 FL (ref 79–97)
MONOCYTES # BLD AUTO: 0.65 10*3/MM3 (ref 0.1–0.9)
MONOCYTES NFR BLD AUTO: 9.3 % (ref 5–12)
NEUTROPHILS NFR BLD AUTO: 3.24 10*3/MM3 (ref 1.7–7)
NEUTROPHILS NFR BLD AUTO: 46.5 % (ref 42.7–76)
NRBC BLD AUTO-RTO: 0 /100 WBC (ref 0–0.2)
PLATELET # BLD AUTO: 283 10*3/MM3 (ref 140–450)
PMV BLD AUTO: 10 FL (ref 6–12)
POTASSIUM SERPL-SCNC: 4.6 MMOL/L (ref 3.5–5.2)
PROT SERPL-MCNC: 6.9 G/DL (ref 6–8.5)
RBC # BLD AUTO: 4.33 10*6/MM3 (ref 4.14–5.8)
SODIUM SERPL-SCNC: 144 MMOL/L (ref 136–145)
TIBC SERPL-MCNC: 352 MCG/DL (ref 298–536)
TRANSFERRIN SERPL-MCNC: 236 MG/DL (ref 200–360)
TRIGL SERPL-MCNC: 101 MG/DL (ref 0–150)
VLDLC SERPL-MCNC: 19 MG/DL (ref 5–40)
WBC NRBC COR # BLD: 6.97 10*3/MM3 (ref 3.4–10.8)

## 2022-11-19 PROCEDURE — 82728 ASSAY OF FERRITIN: CPT

## 2022-11-19 PROCEDURE — 83540 ASSAY OF IRON: CPT

## 2022-11-19 PROCEDURE — 80053 COMPREHEN METABOLIC PANEL: CPT

## 2022-11-19 PROCEDURE — 85025 COMPLETE CBC W/AUTO DIFF WBC: CPT

## 2022-11-19 PROCEDURE — 84466 ASSAY OF TRANSFERRIN: CPT

## 2022-11-19 PROCEDURE — 80061 LIPID PANEL: CPT

## 2022-11-19 PROCEDURE — 36415 COLL VENOUS BLD VENIPUNCTURE: CPT

## 2022-11-19 PROCEDURE — 82550 ASSAY OF CK (CPK): CPT

## 2022-11-19 PROCEDURE — 83036 HEMOGLOBIN GLYCOSYLATED A1C: CPT

## 2022-11-21 ENCOUNTER — OFFICE VISIT (OUTPATIENT)
Dept: INTERNAL MEDICINE | Facility: CLINIC | Age: 72
End: 2022-11-21

## 2022-11-21 VITALS
SYSTOLIC BLOOD PRESSURE: 119 MMHG | BODY MASS INDEX: 31.43 KG/M2 | TEMPERATURE: 97.2 F | WEIGHT: 271.6 LBS | OXYGEN SATURATION: 99 % | HEART RATE: 65 BPM | DIASTOLIC BLOOD PRESSURE: 70 MMHG | HEIGHT: 78 IN

## 2022-11-21 DIAGNOSIS — I10 ESSENTIAL HYPERTENSION: Primary | ICD-10-CM

## 2022-11-21 DIAGNOSIS — Z23 NEED FOR VACCINATION: ICD-10-CM

## 2022-11-21 DIAGNOSIS — E11.9 TYPE 2 DIABETES MELLITUS WITHOUT COMPLICATION, WITHOUT LONG-TERM CURRENT USE OF INSULIN: ICD-10-CM

## 2022-11-21 DIAGNOSIS — E78.2 MIXED HYPERLIPIDEMIA: ICD-10-CM

## 2022-11-21 DIAGNOSIS — Z12.5 PROSTATE CANCER SCREENING: ICD-10-CM

## 2022-11-21 PROCEDURE — G0008 ADMIN INFLUENZA VIRUS VAC: HCPCS | Performed by: INTERNAL MEDICINE

## 2022-11-21 PROCEDURE — 99214 OFFICE O/P EST MOD 30 MIN: CPT | Performed by: INTERNAL MEDICINE

## 2022-11-21 PROCEDURE — 90662 IIV NO PRSV INCREASED AG IM: CPT | Performed by: INTERNAL MEDICINE

## 2022-11-21 PROCEDURE — 91312 COVID-19 (PFIZER) BIVALENT BOOSTER 12+YRS: CPT | Performed by: INTERNAL MEDICINE

## 2022-11-21 PROCEDURE — 0124A COVID-19 (PFIZER) BIVALENT BOOSTER 12+YRS: CPT | Performed by: INTERNAL MEDICINE

## 2022-11-21 NOTE — ASSESSMENT & PLAN NOTE
A1c is holding steady at 6.9 as of 11/22 office visit.  Patient is doing much better watching the carbohydrates in his diet.  He is stable to continue with low-dose metformin only.

## 2022-11-21 NOTE — ASSESSMENT & PLAN NOTE
LDL is stable at 70 as of his 11/22 office visit.  He will continue with low to moderate dose pravastatin only.  CK which has been elevated in the past, is normal at 160.

## 2022-11-21 NOTE — ASSESSMENT & PLAN NOTE
Blood pressure remains controlled as of his 11/22 office visit.  He is stable to continue full dose Exforge, low-dose doxazosin, and low-dose metoprolol.

## 2022-11-21 NOTE — PROGRESS NOTES
"Chief Complaint  Hypertension and Follow-up (Pt states that this is routine, he has labs and he has no new issues. )    Subjective          Marcio Enrique presents to [facility]     Rash  Pertinent negatives include no congestion, cough, fatigue, fever or shortness of breath.      History of present illness:  72-year-old male with underlying hypertension, hyperlipidemia, diabetes, who is coming in 11/22 for routine 4-month follow-up.  We will go over his meds, review his labs in detail, and address any new concerns he may have.    Review of Systems   Constitutional: Negative for appetite change, fatigue and fever.   HENT: Negative for congestion and ear pain.    Eyes: Negative for blurred vision.   Respiratory: Negative for cough, chest tightness, shortness of breath and wheezing.    Cardiovascular: Negative for chest pain, palpitations and leg swelling.   Gastrointestinal: Negative for abdominal pain.   Genitourinary: Negative for difficulty urinating, dysuria and hematuria.   Musculoskeletal: Negative for arthralgias and gait problem.   Skin: Positive for rash. Negative for skin lesions.   Neurological: Negative for syncope, memory problem and confusion.   Psychiatric/Behavioral: Negative for self-injury and depressed mood.       Objective   Vital Signs:   /70 (BP Location: Right arm, Patient Position: Sitting, Cuff Size: Adult)   Pulse 65   Temp 97.2 °F (36.2 °C) (Skin)   Ht 198.1 cm (77.99\")   Wt 123 kg (271 lb 9.6 oz)   SpO2 99%   BMI 31.39 kg/m²           Physical Exam  Vitals and nursing note reviewed.   Constitutional:       General: He is not in acute distress.     Appearance: Normal appearance. He is not toxic-appearing.   HENT:      Head: Atraumatic.      Right Ear: External ear normal.      Left Ear: External ear normal.      Nose: Nose normal.      Mouth/Throat:      Mouth: Mucous membranes are moist.   Eyes:      General:         Right eye: No discharge.    "      Left eye: No discharge.      Extraocular Movements: Extraocular movements intact.      Pupils: Pupils are equal, round, and reactive to light.   Cardiovascular:      Rate and Rhythm: Normal rate and regular rhythm.      Pulses: Normal pulses.      Heart sounds: Normal heart sounds. No murmur heard.    No gallop.   Pulmonary:      Effort: Pulmonary effort is normal. No respiratory distress.      Breath sounds: No wheezing, rhonchi or rales.   Abdominal:      General: There is no distension.      Palpations: Abdomen is soft. There is no mass.      Tenderness: There is no abdominal tenderness. There is no guarding.   Musculoskeletal:         General: No swelling or tenderness.      Cervical back: No tenderness.      Right lower leg: No edema.      Left lower leg: No edema.   Skin:     General: Skin is warm and dry.      Findings: No rash.   Neurological:      General: No focal deficit present.      Mental Status: He is alert and oriented to person, place, and time. Mental status is at baseline.      Motor: No weakness.      Gait: Gait normal.   Psychiatric:         Mood and Affect: Mood normal.         Thought Content: Thought content normal.          Result Review :   The following data was reviewed by: Aleksandr Olmstead MD on 08/23/2021:                  Assessment and Plan    Diagnoses and all orders for this visit:    1. Essential hypertension (Primary)  Assessment & Plan:  Blood pressure remains controlled as of his 11/22 office visit.  He is stable to continue full dose Exforge, low-dose doxazosin, and low-dose metoprolol.    Orders:  -     Comprehensive Metabolic Panel; Future    2. Mixed hyperlipidemia  Assessment & Plan:  LDL is stable at 70 as of his 11/22 office visit.  He will continue with low to moderate dose pravastatin only.  CK which has been elevated in the past, is normal at 160.    Orders:  -     Lipid Panel; Future    3. Type 2 diabetes mellitus without complication, without long-term current use  of insulin (HCC)  Assessment & Plan:  A1c is holding steady at 6.9 as of 11/22 office visit.  Patient is doing much better watching the carbohydrates in his diet.  He is stable to continue with low-dose metformin only.    Orders:  -     Hemoglobin A1c; Future    4. Need for vaccination  -     Fluzone High-Dose 65+yrs (8360-8820)  -     COVID-19 Bivalent Booster (Pfizer) 12+yrs    5. Prostate cancer screening  -     PSA Screen; Future     --  --  Older notes:  VISIT 4/21:   ANNUAL MEDICARE WELLNESS EXAM 12/20 = reviewed all forms with pt; no new discoveries.  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  --  DM stable on only 500 qd and that is fine...6.3...6.9 and I d/w bad trend...6.9 is holding on 500 qd only... 7.4 and rec 1000 qd again...7.0...7.4 is due to the steroids, and they are being weaned, so no med changes made 9/19...6.9 is back down off steroids...7.5 and will increase on RTO if same/higher = only on 500 qd recall...6.9 on 1000 qAM as of 8/20 OV---> 7.1 is fine 4/21. (TSH neg 4/21).  --  LIPIDS stable 3/14 w/o treatment...elevated CK without statins anyhow...defer... holding steady...122 and will add statin now...78, but CK up and c/o joints at 5/18 OV, so will hold Pravastatin 10 mg for now...86 is fine...96=ok for now---> 84 in 4/20.  --  HTN with need for checks at home before increase Exforge to max dose as of 2/18 OV... BP stable off HCTZ and on toprol...required increase exforge per Karyn; BP great 4/19 OV despite back on Naproxen past week---> stable 4/21.  --  SYNCOPE 2/18 and to ER for 5 hrs; had neg V/Q; did have aura, so most c/w vasovagal; exam neg; will get  ECHO/Dopplers/tilt to complete w/u...ECHO is as below, the Carotids 3/18 were neg, and TILT + for NEUROCARDIOGENIC SYNCOPE=will try to lose HCTZ and add in low dose of toprol; pt to monitor BP and call with results; d/w keep hydrated/heed warning signs...c/o dizziness with rapid head mvmt and rec eval per ENT now...got better on it's own.  ASCENDING AORTIC DILATATION=3.8 cm per ECHO 3/18 (d/w nl 3.5 or less, dilated 3.5 to 4.5, aneurysmal is above 4.4, and surgery above 5.5).  --  DJD with L knee/shoulder requiring MRI's...increased sxs 5/18, so holding statin...c/o knee again 12/18, but doesn't sound severe; OTC meds for now...I was unaware of bump in BP, so I started Naprosyn back a week ago...on prednisone as of 6/19 OV with Dr Mcgovern following now...ESR down 86 to 9 on prednisone as of 9/19 OV...still being weaned as of 12/19 OV and I d/w add tylenol to help offset the prednisone wean.  --  H PYLORI +...d/w tx plan with prilosec 20 mg/amoxil 1000mg/biaxin 500 mg all bid for 14 days...tolerated this.  GERD/BURPPING was better when he was on PPI for above, so try qod...no c/o 10/17.  GALLSTONES noted per below eval and will eval if persistent.  R ABD PAIN to ER, was nauseated, CT=? liver cysts and L renal lesion, he's concerned, so will get MRI 2/16... MRI 3/17 is with stable cysts.   --  BPH with intol to cardura prior, slow stream but no other sx's...defer for now.   --  --  PSA 1.8 (4/2/21)  COLON 12/12...normal...Dr Sutherland...5 yrs given FH+ x 2...d/w 10/17 OV---> d/w again 6/19, but now says neg FH 6/19??? = rec cologuard=neg 7/19---> order placed 7/22.  PNEUMOVAX #1 12/13, Prevnar '16; rec Hep A 5/18; COVID x2 as of 4/21.  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    Follow Up   Return in about 4 months (around  3/21/2023).  Patient was given instructions and counseling regarding his condition or for health maintenance advice. Please see specific information pulled into the AVS if appropriate.

## 2022-11-30 RX ORDER — METFORMIN HYDROCHLORIDE 500 MG/1
1000 TABLET, EXTENDED RELEASE ORAL
Qty: 180 TABLET | Refills: 1 | Status: SHIPPED | OUTPATIENT
Start: 2022-11-30

## 2022-12-27 RX ORDER — DOXAZOSIN 2 MG/1
TABLET ORAL
Qty: 90 TABLET | Refills: 1 | Status: SHIPPED | OUTPATIENT
Start: 2022-12-27

## 2022-12-27 RX ORDER — PRAVASTATIN SODIUM 10 MG
TABLET ORAL
Qty: 90 TABLET | Refills: 1 | Status: SHIPPED | OUTPATIENT
Start: 2022-12-27 | End: 2023-03-29

## 2023-01-05 RX ORDER — METOPROLOL SUCCINATE 25 MG/1
TABLET, EXTENDED RELEASE ORAL
Qty: 15 TABLET | Refills: 2 | Status: SHIPPED | OUTPATIENT
Start: 2023-01-05

## 2023-01-05 RX ORDER — OMEPRAZOLE 40 MG/1
CAPSULE, DELAYED RELEASE ORAL
Qty: 180 CAPSULE | Refills: 1 | Status: SHIPPED | OUTPATIENT
Start: 2023-01-05

## 2023-01-06 DIAGNOSIS — E11.9 TYPE 2 DIABETES MELLITUS WITHOUT COMPLICATION, WITHOUT LONG-TERM CURRENT USE OF INSULIN: Primary | ICD-10-CM

## 2023-01-06 RX ORDER — BLOOD GLUCOSE CONTROL HIGH,LOW
1 EACH MISCELLANEOUS DAILY PRN
Qty: 1 EACH | Refills: 5 | Status: SHIPPED | OUTPATIENT
Start: 2023-01-06

## 2023-01-16 RX ORDER — METOPROLOL SUCCINATE 25 MG/1
TABLET, EXTENDED RELEASE ORAL
Qty: 45 TABLET | OUTPATIENT
Start: 2023-01-16

## 2023-01-16 RX ORDER — OMEPRAZOLE 40 MG/1
CAPSULE, DELAYED RELEASE ORAL
Qty: 90 CAPSULE | Refills: 1 | OUTPATIENT
Start: 2023-01-16

## 2023-03-14 ENCOUNTER — LAB (OUTPATIENT)
Dept: LAB | Facility: HOSPITAL | Age: 73
End: 2023-03-14
Payer: MEDICARE

## 2023-03-14 DIAGNOSIS — E78.2 MIXED HYPERLIPIDEMIA: ICD-10-CM

## 2023-03-14 DIAGNOSIS — Z12.5 PROSTATE CANCER SCREENING: ICD-10-CM

## 2023-03-14 DIAGNOSIS — E11.9 TYPE 2 DIABETES MELLITUS WITHOUT COMPLICATION, WITHOUT LONG-TERM CURRENT USE OF INSULIN: ICD-10-CM

## 2023-03-14 DIAGNOSIS — I10 ESSENTIAL HYPERTENSION: ICD-10-CM

## 2023-03-14 LAB
ALBUMIN SERPL-MCNC: 4.3 G/DL (ref 3.5–5.2)
ALBUMIN/GLOB SERPL: 1.7 G/DL
ALP SERPL-CCNC: 76 U/L (ref 39–117)
ALT SERPL W P-5'-P-CCNC: 17 U/L (ref 1–41)
ANION GAP SERPL CALCULATED.3IONS-SCNC: 13.3 MMOL/L (ref 5–15)
AST SERPL-CCNC: 23 U/L (ref 1–40)
BILIRUB SERPL-MCNC: 0.2 MG/DL (ref 0–1.2)
BUN SERPL-MCNC: 19 MG/DL (ref 8–23)
BUN/CREAT SERPL: 17 (ref 7–25)
CALCIUM SPEC-SCNC: 9.8 MG/DL (ref 8.6–10.5)
CHLORIDE SERPL-SCNC: 106 MMOL/L (ref 98–107)
CHOLEST SERPL-MCNC: 132 MG/DL (ref 0–200)
CO2 SERPL-SCNC: 25.7 MMOL/L (ref 22–29)
CREAT SERPL-MCNC: 1.12 MG/DL (ref 0.76–1.27)
EGFRCR SERPLBLD CKD-EPI 2021: 68.9 ML/MIN/1.73
GLOBULIN UR ELPH-MCNC: 2.5 GM/DL
GLUCOSE SERPL-MCNC: 134 MG/DL (ref 65–99)
HBA1C MFR BLD: 6.6 % (ref 4.8–5.6)
HDLC SERPL-MCNC: 47 MG/DL (ref 40–60)
LDLC SERPL CALC-MCNC: 73 MG/DL (ref 0–100)
LDLC/HDLC SERPL: 1.58 {RATIO}
POTASSIUM SERPL-SCNC: 4.5 MMOL/L (ref 3.5–5.2)
PROT SERPL-MCNC: 6.8 G/DL (ref 6–8.5)
PSA SERPL-MCNC: 2.24 NG/ML (ref 0–4)
SODIUM SERPL-SCNC: 145 MMOL/L (ref 136–145)
TRIGL SERPL-MCNC: 53 MG/DL (ref 0–150)
VLDLC SERPL-MCNC: 12 MG/DL (ref 5–40)

## 2023-03-14 PROCEDURE — 80053 COMPREHEN METABOLIC PANEL: CPT

## 2023-03-14 PROCEDURE — 80061 LIPID PANEL: CPT

## 2023-03-14 PROCEDURE — G0103 PSA SCREENING: HCPCS

## 2023-03-14 PROCEDURE — 36415 COLL VENOUS BLD VENIPUNCTURE: CPT

## 2023-03-14 PROCEDURE — 83036 HEMOGLOBIN GLYCOSYLATED A1C: CPT

## 2023-03-26 PROBLEM — R53.83 FATIGUE: Status: RESOLVED | Noted: 2021-07-29 | Resolved: 2023-03-26

## 2023-03-26 PROBLEM — Z00.00 MEDICARE ANNUAL WELLNESS VISIT, SUBSEQUENT: Status: ACTIVE | Noted: 2023-03-26

## 2023-03-26 NOTE — PROGRESS NOTES
"Chief Complaint  Hypertension, Follow-up (Pt states that this is routine, he had labs. //He states that he has a pain in his back, he thinks that he picked up something ), and Diabetes (He feels that he is getting neuropathy from this. )    Subjective          Marcio Enrique presents to [facility]     History of present illness:  72-year-old male with underlying hypertension, hyperlipidemia, diabetes, who is coming in 3/23 for routine 4-month follow-up.  We will go over his meds, review his labs in detail, and address any new concerns he may have.    Review of Systems   Constitutional: Negative for appetite change, fatigue and fever.   HENT: Negative for congestion and ear pain.    Eyes: Negative for blurred vision.   Respiratory: Negative for cough, chest tightness, shortness of breath and wheezing.    Cardiovascular: Negative for chest pain, palpitations and leg swelling.   Gastrointestinal: Negative for abdominal pain.   Genitourinary: Negative for difficulty urinating, dysuria and hematuria.   Musculoskeletal: Negative for arthralgias and gait problem.   Skin: Negative for skin lesions.   Neurological: Negative for syncope, memory problem and confusion.   Psychiatric/Behavioral: Negative for self-injury and depressed mood.       Objective   Vital Signs:   /70   Pulse 55   Temp 97.7 °F (36.5 °C) (Skin)   Ht 198.1 cm (77.99\")   Wt 119 kg (263 lb 6.4 oz)   SpO2 99%   BMI 30.45 kg/m²           Physical Exam  Vitals and nursing note reviewed.   Constitutional:       General: He is not in acute distress.     Appearance: Normal appearance. He is not toxic-appearing.   HENT:      Head: Atraumatic.      Right Ear: External ear normal.      Left Ear: External ear normal.      Nose: Nose normal.      Mouth/Throat:      Mouth: Mucous membranes are moist.   Eyes:      General:         Right eye: No discharge.         Left eye: No discharge.      Extraocular Movements: Extraocular " movements intact.      Pupils: Pupils are equal, round, and reactive to light.   Cardiovascular:      Rate and Rhythm: Normal rate and regular rhythm.      Pulses: Normal pulses.      Heart sounds: Normal heart sounds. No murmur heard.    No gallop.   Pulmonary:      Effort: Pulmonary effort is normal. No respiratory distress.      Breath sounds: No wheezing, rhonchi or rales.   Abdominal:      General: There is no distension.      Palpations: Abdomen is soft. There is no mass.      Tenderness: There is no abdominal tenderness. There is no guarding.   Musculoskeletal:         General: No swelling or tenderness.      Cervical back: No tenderness.      Right lower leg: No edema.      Left lower leg: No edema.   Skin:     General: Skin is warm and dry.      Findings: No rash.   Neurological:      General: No focal deficit present.      Mental Status: He is alert and oriented to person, place, and time. Mental status is at baseline.      Motor: No weakness.      Gait: Gait normal.   Psychiatric:         Mood and Affect: Mood normal.         Thought Content: Thought content normal.          Result Review :   The following data was reviewed by: Aleksandr Olmstead MD on 08/23/2021:                  Assessment and Plan    Diagnoses and all orders for this visit:    1. Medicare annual wellness visit, subsequent (Primary)  Assessment & Plan:  AWV completed 3/23.  Remains very independent, no falls, no hospitalizations.  Discussed with patient need for living will/etc.      2. Type 2 diabetes mellitus without complication, without long-term current use of insulin (HCC)  Assessment & Plan:  A1c is down further to 6.6 as of his 3/23 office visit.  He is stable on low-dose metformin, also doing better on the carbs etc.  No changes needed continue same plan of care    Orders:  -     Microalbumin / Creatinine Urine Ratio - Urine, Clean Catch; Future  -     Hemoglobin A1c; Future    3. Mixed hyperlipidemia  Assessment & Plan:  LDL is  stable at 73 as of his 3/23 office visit.  He will continue with just low to moderate dose pravastatin.      4. Essential hypertension  Assessment & Plan:  Blood pressure remains controlled as of his 3/23 office visit.  He is stable to continue full dose Exforge, low-dose doxazosin, and low-dose metoprolol.    Orders:  -     Basic Metabolic Panel; Future    5. Well adult exam  -     TSH; Future  -     Vitamin B12 anemia; Future  -     Folate anemia; Future     --  --  Older notes:  VISIT 4/21:   ANNUAL MEDICARE WELLNESS EXAM 12/20 = reviewed all forms with pt; no new discoveries.  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  --  DM stable on only 500 qd and that is fine...6.3...6.9 and I d/w bad trend...6.9 is holding on 500 qd only... 7.4 and rec 1000 qd again...7.0...7.4 is due to the steroids, and they are being weaned, so no med changes made 9/19...6.9 is back down off steroids...7.5 and will increase on RTO if same/higher = only on 500 qd recall...6.9 on 1000 qAM as of 8/20 OV---> 7.1 is fine 4/21. (TSH neg 4/21).  --  LIPIDS stable 3/14 w/o treatment...elevated CK without statins anyhow...defer... holding steady...122 and will add statin now...78, but CK up and c/o joints at 5/18 OV, so will hold Pravastatin 10 mg for now...86 is fine...96=ok for now---> 84 in 4/20.  --  HTN with need for checks at home before increase Exforge to max dose as of 2/18 OV... BP stable off HCTZ and on toprol...required increase exforge per Karyn; BP great 4/19 OV despite back on Naproxen past week---> stable 4/21.  --  SYNCOPE 2/18 and to ER for 5 hrs; had neg V/Q; did have aura, so most c/w  vasovagal; exam neg; will get ECHO/Dopplers/tilt to complete w/u...ECHO is as below, the Carotids 3/18 were neg, and TILT + for NEUROCARDIOGENIC SYNCOPE=will try to lose HCTZ and add in low dose of toprol; pt to monitor BP and call with results; d/w keep hydrated/heed warning signs...c/o dizziness with rapid head mvmt and rec eval per ENT now...got better on it's own.  ASCENDING AORTIC DILATATION=3.8 cm per ECHO 3/18 (d/w nl 3.5 or less, dilated 3.5 to 4.5, aneurysmal is above 4.4, and surgery above 5.5).  --  DJD with L knee/shoulder requiring MRI's...increased sxs 5/18, so holding statin...c/o knee again 12/18, but doesn't sound severe; OTC meds for now...I was unaware of bump in BP, so I started Naprosyn back a week ago...on prednisone as of 6/19 OV with Dr Mcgovern following now...ESR down 86 to 9 on prednisone as of 9/19 OV...still being weaned as of 12/19 OV and I d/w add tylenol to help offset the prednisone wean.  --  H PYLORI +...d/w tx plan with prilosec 20 mg/amoxil 1000mg/biaxin 500 mg all bid for 14 days...tolerated this.  GERD/BURPPING was better when he was on PPI for above, so try qod...no c/o 10/17.  GALLSTONES noted per below eval and will eval if persistent.  R ABD PAIN to ER, was nauseated, CT=? liver cysts and L renal lesion, he's concerned, so will get MRI 2/16... MRI 3/17 is with stable cysts.   --  BPH with intol to cardura prior, slow stream but no other sx's...defer for now.   --  --  PSA 2.2 (3/14/2023)  COLON 8/22 = Cologuard negative  PNEUMOVAX #1 12/13, Prevnar '16; rec Hep A 5/18.  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    Follow Up   Return in about 4 months (around 7/29/2023).  Patient was given instructions and counseling regarding his condition or for health maintenance advice. Please see  specific information pulled into the AVS if appropriate.

## 2023-03-29 ENCOUNTER — OFFICE VISIT (OUTPATIENT)
Dept: INTERNAL MEDICINE | Facility: CLINIC | Age: 73
End: 2023-03-29
Payer: MEDICARE

## 2023-03-29 VITALS
WEIGHT: 263.4 LBS | HEART RATE: 55 BPM | SYSTOLIC BLOOD PRESSURE: 136 MMHG | OXYGEN SATURATION: 99 % | BODY MASS INDEX: 30.48 KG/M2 | HEIGHT: 78 IN | DIASTOLIC BLOOD PRESSURE: 70 MMHG | TEMPERATURE: 97.7 F

## 2023-03-29 DIAGNOSIS — E11.9 TYPE 2 DIABETES MELLITUS WITHOUT COMPLICATION, WITHOUT LONG-TERM CURRENT USE OF INSULIN: ICD-10-CM

## 2023-03-29 DIAGNOSIS — Z00.00 MEDICARE ANNUAL WELLNESS VISIT, SUBSEQUENT: Primary | ICD-10-CM

## 2023-03-29 DIAGNOSIS — I10 ESSENTIAL HYPERTENSION: ICD-10-CM

## 2023-03-29 DIAGNOSIS — E78.2 MIXED HYPERLIPIDEMIA: ICD-10-CM

## 2023-03-29 DIAGNOSIS — Z00.00 WELL ADULT EXAM: ICD-10-CM

## 2023-03-29 NOTE — ASSESSMENT & PLAN NOTE
A1c is down further to 6.6 as of his 3/23 office visit.  He is stable on low-dose metformin, also doing better on the carbs etc.  No changes needed continue same plan of care

## 2023-03-29 NOTE — PROGRESS NOTES
The ABCs of the Annual Wellness Visit  Subsequent Medicare Wellness Visit    Subjective      Marcio Enrique is a 72 y.o. male who presents for a Subsequent Medicare Wellness Visit.    The following portions of the patient's history were reviewed and   updated as appropriate: allergies, current medications, past family history, past medical history, past social history, past surgical history and problem list.    Compared to one year ago, the patient feels his physical   health is the same.    Compared to one year ago, the patient feels his mental   health is the same.    Recent Hospitalizations:  He was not admitted to the hospital during the last year.       Current Medical Providers:  Patient Care Team:  Aleksandr Olmstead MD as PCP - General (Internal Medicine)    Outpatient Medications Prior to Visit   Medication Sig Dispense Refill   • amLODIPine-valsartan (EXFORGE)  MG per tablet TAKE 1 TABLET BY MOUTH EVERY DAY 90 tablet 1   • aspirin 81 MG tablet Take 1 tablet by mouth Daily.     • Blood Glucose Calibration (Accu-Chek Guide Control) liquid 1 each by In Vitro route Daily As Needed (machine needing controled). 1 each 5   • doxazosin (CARDURA) 2 MG tablet TAKE 1 TABLET BY MOUTH EVERY DAY AT NIGHT 90 tablet 1   • glucose blood test strip Use as instructed 100 each 5   • ketoconazole (NIZORAL) 2 % cream Apply 1 application topically to the appropriate area as directed 2 (Two) Times a Day. 30 g 1   • metFORMIN ER (GLUCOPHAGE-XR) 500 MG 24 hr tablet TAKE 2 TABLETS BY MOUTH DAILY WITH BREAKFAST. 180 tablet 1   • metoprolol succinate XL (TOPROL-XL) 25 MG 24 hr tablet TAKE 1/2 TABLET BY MOUTH EVERY DAY 15 tablet 2   • omeprazole (priLOSEC) 40 MG capsule TAKE 1 CAPSULE BY MOUTH TWICE A  capsule 1   • pravastatin (PRAVACHOL) 20 MG tablet TAKE 1 TABLET BY MOUTH EVERY DAY 90 tablet 1   • sertraline (ZOLOFT) 50 MG tablet TAKE 1 TABLET BY MOUTH EVERY DAY 30 tablet 5   • pravastatin (PRAVACHOL) 10 MG tablet TAKE 1  "TABLET BY MOUTH EVERY DAY 90 tablet 1     No facility-administered medications prior to visit.       No opioid medication identified on active medication list. I have reviewed chart for other potential  high risk medication/s and harmful drug interactions in the elderly.          Aspirin is on active medication list. Aspirin use is indicated based on review of current medical condition/s. Pros and cons of this therapy have been discussed today. Benefits of this medication outweigh potential harm.  Patient has been encouraged to continue taking this medication.  .      Patient Active Problem List   Diagnosis   • Essential hypertension   • Gastro-esophageal reflux disease without esophagitis   • Mixed hyperlipidemia   • Type 2 diabetes mellitus without complication, without long-term current use of insulin ([facility])   • Primary osteoarthritis involving multiple joints   • Iron deficiency anemia secondary to inadequate dietary iron intake   • Dyspnea on exertion   • Vertigo   • Seborrheic dermatitis   • Medicare annual wellness visit, subsequent     Advance Care Planning  Advance Directive is not on file.  ACP discussion was held with the patient during this visit. Patient does not have an advance directive, information provided.     Objective    Vitals:    03/29/23 1006   BP: 136/70   Pulse: 55   Temp: 97.7 °F (36.5 °C)   TempSrc: Skin   SpO2: 99%   Weight: 119 kg (263 lb 6.4 oz)   Height: 198.1 cm (77.99\")     Estimated body mass index is 30.45 kg/m² as calculated from the following:    Height as of this encounter: 198.1 cm (77.99\").    Weight as of this encounter: 119 kg (263 lb 6.4 oz).    BMI is >= 30 and <35. (Class 1 Obesity). The following options were offered after discussion;: exercise counseling/recommendations and nutrition counseling/recommendations      Does the patient have evidence of cognitive impairment?   No    Lab Results   Component Value Date    TRIG 53 03/14/2023    HDL 47 03/14/2023    LDL 73 " 03/14/2023    VLDL 12 03/14/2023    HGBA1C 6.60 (H) 03/14/2023          HEALTH RISK ASSESSMENT    Smoking Status:  Social History     Tobacco Use   Smoking Status Never   Smokeless Tobacco Never     Alcohol Consumption:  Social History     Substance and Sexual Activity   Alcohol Use No     Fall Risk Screen:    MARCINADI Fall Risk Assessment was completed, and patient is at LOW risk for falls.Assessment completed on:3/29/2023    Depression Screening:  PHQ-2/PHQ-9 Depression Screening 3/29/2023   Little Interest or Pleasure in Doing Things 0-->not at all   Feeling Down, Depressed or Hopeless 0-->not at all   PHQ-9: Brief Depression Severity Measure Score 0       Health Habits and Functional and Cognitive Screening:  Functional & Cognitive Status 3/29/2023   Do you have difficulty preparing food and eating? No   Do you have difficulty bathing yourself, getting dressed or grooming yourself? No   Do you have difficulty using the toilet? No   Do you have difficulty moving around from place to place? No   Do you have trouble with steps or getting out of a bed or a chair? No   Current Diet Well Balanced Diet   Do you need help using the phone?  No   Are you deaf or do you have serious difficulty hearing?  No   Do you need help with transportation? No   Do you need help shopping? No   Do you need help preparing meals?  No   Do you need help with housework?  No   Do you need help with laundry? No   Do you need help taking your medications? No   Do you need help managing money? No   Do you ever drive or ride in a car without wearing a seat belt? No   Do you have difficulty concentrating, remembering or making decisions? No       Age-appropriate Screening Schedule:  Refer to the list below for future screening recommendations based on patient's age, sex and/or medical conditions. Orders for these recommended tests are listed in the plan section. The patient has been provided with a written plan.    Health Maintenance   Topic Date  Due   • ZOSTER VACCINE (1 of 2) Never done   • DIABETIC FOOT EXAM  Never done   • TDAP/TD VACCINES (1 - Tdap) 07/21/2022   • COLORECTAL CANCER SCREENING  08/04/2025 (Originally 1950)   • DIABETIC EYE EXAM  05/13/2023   • URINE MICROALBUMIN  07/13/2023   • HEMOGLOBIN A1C  09/14/2023   • LIPID PANEL  03/14/2024   • ANNUAL WELLNESS VISIT  03/29/2024   • HEPATITIS C SCREENING  Completed   • COVID-19 Vaccine  Completed   • INFLUENZA VACCINE  Completed   • Pneumococcal Vaccine 65+  Completed                CMS Preventative Services Quick Reference  Risk Factors Identified During Encounter:    None Identified    The above risks/problems have been discussed with the patient.  Pertinent information has been shared with the patient in the After Visit Summary.    Diagnoses and all orders for this visit:    1. Medicare annual wellness visit, subsequent (Primary)  Assessment & Plan:  AWV completed 3/23.  Remains very independent, no falls, no hospitalizations.  Discussed with patient need for living will/etc.      2. Type 2 diabetes mellitus without complication, without long-term current use of insulin (HCC)  Assessment & Plan:  A1c is down further to 6.6 as of his 3/23 office visit.  He is stable on low-dose metformin, also doing better on the carbs etc.  No changes needed continue same plan of care    Orders:  -     Microalbumin / Creatinine Urine Ratio - Urine, Clean Catch; Future  -     Hemoglobin A1c; Future    3. Mixed hyperlipidemia  Assessment & Plan:  LDL is stable at 73 as of his 3/23 office visit.  He will continue with just low to moderate dose pravastatin.      4. Essential hypertension  Assessment & Plan:  Blood pressure remains controlled as of his 3/23 office visit.  He is stable to continue full dose Exforge, low-dose doxazosin, and low-dose metoprolol.    Orders:  -     Basic Metabolic Panel; Future    5. Well adult exam  -     TSH; Future      Follow Up:   Next Medicare Wellness visit to be scheduled in  1 year.      An After Visit Summary and PPPS were made available to the patient.

## 2023-03-29 NOTE — ASSESSMENT & PLAN NOTE
AWV completed 3/23.  Remains very independent, no falls, no hospitalizations.  Discussed with patient need for living will/etc.

## 2023-03-29 NOTE — ASSESSMENT & PLAN NOTE
LDL is stable at 73 as of his 3/23 office visit.  He will continue with just low to moderate dose pravastatin.

## 2023-03-29 NOTE — ASSESSMENT & PLAN NOTE
Blood pressure remains controlled as of his 3/23 office visit.  He is stable to continue full dose Exforge, low-dose doxazosin, and low-dose metoprolol.

## 2023-04-14 RX ORDER — METOPROLOL SUCCINATE 25 MG/1
TABLET, EXTENDED RELEASE ORAL
Qty: 45 TABLET | Refills: 1 | Status: SHIPPED | OUTPATIENT
Start: 2023-04-14

## 2023-04-14 RX ORDER — DOXAZOSIN 2 MG/1
TABLET ORAL
Qty: 90 TABLET | Refills: 1 | Status: SHIPPED | OUTPATIENT
Start: 2023-04-14

## 2023-04-14 RX ORDER — AMLODIPINE AND VALSARTAN 10; 320 MG/1; MG/1
TABLET ORAL
Qty: 90 TABLET | Refills: 1 | Status: SHIPPED | OUTPATIENT
Start: 2023-04-14

## 2023-04-14 RX ORDER — METFORMIN HYDROCHLORIDE 500 MG/1
1000 TABLET, EXTENDED RELEASE ORAL
Qty: 180 TABLET | Refills: 1 | Status: SHIPPED | OUTPATIENT
Start: 2023-04-14

## 2023-06-05 RX ORDER — PRAVASTATIN SODIUM 20 MG
TABLET ORAL
Qty: 90 TABLET | Refills: 1 | Status: SHIPPED | OUTPATIENT
Start: 2023-06-05

## 2023-06-26 PROBLEM — J02.9 SORE THROAT: Status: ACTIVE | Noted: 2023-06-26

## 2023-06-26 PROBLEM — F51.01 PRIMARY INSOMNIA: Status: ACTIVE | Noted: 2023-06-26

## 2023-07-25 ENCOUNTER — LAB (OUTPATIENT)
Dept: LAB | Facility: HOSPITAL | Age: 73
End: 2023-07-25
Payer: MEDICARE

## 2023-07-25 DIAGNOSIS — E11.9 TYPE 2 DIABETES MELLITUS WITHOUT COMPLICATION, WITHOUT LONG-TERM CURRENT USE OF INSULIN: ICD-10-CM

## 2023-07-25 DIAGNOSIS — Z00.00 WELL ADULT EXAM: ICD-10-CM

## 2023-07-25 DIAGNOSIS — I10 ESSENTIAL HYPERTENSION: ICD-10-CM

## 2023-07-25 LAB
ALBUMIN UR-MCNC: 2.1 MG/DL
ANION GAP SERPL CALCULATED.3IONS-SCNC: 10 MMOL/L (ref 5–15)
BUN SERPL-MCNC: 14 MG/DL (ref 8–23)
BUN/CREAT SERPL: 11.9 (ref 7–25)
CALCIUM SPEC-SCNC: 9.2 MG/DL (ref 8.6–10.5)
CHLORIDE SERPL-SCNC: 108 MMOL/L (ref 98–107)
CO2 SERPL-SCNC: 24 MMOL/L (ref 22–29)
CREAT SERPL-MCNC: 1.18 MG/DL (ref 0.76–1.27)
CREAT UR-MCNC: 132.7 MG/DL
EGFRCR SERPLBLD CKD-EPI 2021: 64.3 ML/MIN/1.73
FOLATE SERPL-MCNC: >20 NG/ML (ref 4.78–24.2)
GLUCOSE SERPL-MCNC: 121 MG/DL (ref 65–99)
HBA1C MFR BLD: 6.5 % (ref 4.8–5.6)
MICROALBUMIN/CREAT UR: 15.8 MG/G
POTASSIUM SERPL-SCNC: 4 MMOL/L (ref 3.5–5.2)
SODIUM SERPL-SCNC: 142 MMOL/L (ref 136–145)
TSH SERPL DL<=0.05 MIU/L-ACNC: 2.56 UIU/ML (ref 0.27–4.2)
VIT B12 BLD-MCNC: 774 PG/ML (ref 211–946)

## 2023-07-25 PROCEDURE — 82043 UR ALBUMIN QUANTITATIVE: CPT

## 2023-07-25 PROCEDURE — 36415 COLL VENOUS BLD VENIPUNCTURE: CPT

## 2023-07-25 PROCEDURE — 82607 VITAMIN B-12: CPT

## 2023-07-25 PROCEDURE — 83036 HEMOGLOBIN GLYCOSYLATED A1C: CPT

## 2023-07-25 PROCEDURE — 80048 BASIC METABOLIC PNL TOTAL CA: CPT

## 2023-07-25 PROCEDURE — 82570 ASSAY OF URINE CREATININE: CPT

## 2023-07-25 PROCEDURE — 84443 ASSAY THYROID STIM HORMONE: CPT

## 2023-07-25 PROCEDURE — 82746 ASSAY OF FOLIC ACID SERUM: CPT

## 2023-07-27 PROBLEM — E11.9 TYPE 2 DIABETES MELLITUS WITHOUT COMPLICATION, WITHOUT LONG-TERM CURRENT USE OF INSULIN: Status: RESOLVED | Noted: 2021-08-19 | Resolved: 2023-07-27

## 2023-07-27 PROBLEM — J02.9 SORE THROAT: Status: RESOLVED | Noted: 2023-06-26 | Resolved: 2023-07-27

## 2023-07-28 ENCOUNTER — HOSPITAL ENCOUNTER (OUTPATIENT)
Dept: GENERAL RADIOLOGY | Facility: HOSPITAL | Age: 73
Discharge: HOME | End: 2023-07-28
Admitting: INTERNAL MEDICINE
Payer: MEDICARE

## 2023-07-28 ENCOUNTER — TELEPHONE (OUTPATIENT)
Dept: INTERNAL MEDICINE | Facility: CLINIC | Age: 73
End: 2023-07-28

## 2023-07-28 ENCOUNTER — OFFICE VISIT (OUTPATIENT)
Dept: INTERNAL MEDICINE | Facility: CLINIC | Age: 73
End: 2023-07-28
Payer: MEDICARE

## 2023-07-28 VITALS
RESPIRATION RATE: 18 BRPM | SYSTOLIC BLOOD PRESSURE: 140 MMHG | DIASTOLIC BLOOD PRESSURE: 80 MMHG | WEIGHT: 256.4 LBS | HEIGHT: 78 IN | TEMPERATURE: 96.9 F | HEART RATE: 57 BPM | OXYGEN SATURATION: 98 % | BODY MASS INDEX: 29.67 KG/M2

## 2023-07-28 DIAGNOSIS — M25.512 LEFT SHOULDER PAIN, UNSPECIFIED CHRONICITY: ICD-10-CM

## 2023-07-28 DIAGNOSIS — E78.2 MIXED HYPERLIPIDEMIA: ICD-10-CM

## 2023-07-28 DIAGNOSIS — I10 ESSENTIAL HYPERTENSION: ICD-10-CM

## 2023-07-28 DIAGNOSIS — E11.9 TYPE 2 DIABETES MELLITUS WITHOUT COMPLICATION, WITHOUT LONG-TERM CURRENT USE OF INSULIN: Primary | ICD-10-CM

## 2023-07-28 DIAGNOSIS — Z00.00 WELL ADULT EXAM: ICD-10-CM

## 2023-07-28 PROCEDURE — 73030 X-RAY EXAM OF SHOULDER: CPT

## 2023-07-28 NOTE — TELEPHONE ENCOUNTER
Caller: Medina Enrique    Relationship: Emergency Contact    Best call back number: [phone]    What is the best time to reach you: ANY    Who are you requesting to speak with (clinical staff, provider,  specific staff member): CORDELL    What was the call regarding: PATIENT'S DAUGHTER CONCERNED BECAUSE PATIENT HAS NOT TURNED THE AIR ON IN HIS HOUSE IN THIS HEAT. HE STATES HE'S FINE BUT DAUGHTER IS CONCERNED ABOUT HIM

## 2023-07-28 NOTE — ASSESSMENT & PLAN NOTE
This is a new issue as of his 7/23 office visit.  There is no known trauma, but patient is having some pain in it, feels like its out of socket etc.  Significant point tenderness on exam, and his ROM is reasonable.  We will get plain films and ask physical therapy to evaluate, make further recommendations at that time.

## 2023-07-28 NOTE — PROGRESS NOTES
"Chief Complaint  Diabetes (74 y/o male is here to follow up on labs. Patient c/o LT shoulder pain x 4 weeks, patient stated he doesn't recall bumping into anything. Patient also mention having tingling on bilateral feet. No other issues at this time.)    Subjective          Marcio Enrique presents to [facility]     History of present illness:  73-year-old male with underlying hypertension, hyperlipidemia, diabetes, who is coming in 7/23 for routine 4-month follow-up.  We will go over his meds, review his labs in detail, and address any new concerns he may have.    Review of Systems   Constitutional:  Negative for appetite change, fatigue and fever.   HENT:  Negative for congestion and ear pain.    Eyes:  Negative for blurred vision.   Respiratory:  Negative for cough, chest tightness, shortness of breath and wheezing.    Cardiovascular:  Negative for chest pain, palpitations and leg swelling.   Gastrointestinal:  Negative for abdominal pain.   Genitourinary:  Negative for difficulty urinating, dysuria and hematuria.   Musculoskeletal:  Negative for arthralgias and gait problem.   Skin:  Negative for skin lesions.   Neurological:  Negative for syncope, memory problem and confusion.   Psychiatric/Behavioral:  Negative for self-injury and depressed mood.      Objective   Vital Signs:   /80 (BP Location: Left arm, Patient Position: Sitting, Cuff Size: Small Adult)   Pulse 57   Temp 96.9 °F (36.1 °C) (Temporal)   Resp 18   Ht 198.1 cm (77.99\")   Wt 116 kg (256 lb 6.4 oz)   SpO2 98%   BMI 29.64 kg/m²           Physical Exam  Vitals and nursing note reviewed.   Constitutional:       General: He is not in acute distress.     Appearance: Normal appearance. He is not toxic-appearing.   HENT:      Head: Atraumatic.      Right Ear: External ear normal.      Left Ear: External ear normal.      Nose: Nose normal.      Mouth/Throat:      Mouth: Mucous membranes are moist.   Eyes:      " General:         Right eye: No discharge.         Left eye: No discharge.      Extraocular Movements: Extraocular movements intact.      Pupils: Pupils are equal, round, and reactive to light.   Cardiovascular:      Rate and Rhythm: Normal rate and regular rhythm.      Pulses: Normal pulses.      Heart sounds: Normal heart sounds. No murmur heard.    No gallop.   Pulmonary:      Effort: Pulmonary effort is normal. No respiratory distress.      Breath sounds: No wheezing, rhonchi or rales.   Abdominal:      General: There is no distension.      Palpations: Abdomen is soft. There is no mass.      Tenderness: There is no abdominal tenderness. There is no guarding.   Musculoskeletal:         General: No swelling or tenderness.      Cervical back: No tenderness.      Right lower leg: No edema.      Left lower leg: No edema.   Skin:     General: Skin is warm and dry.      Findings: No rash.   Neurological:      General: No focal deficit present.      Mental Status: He is alert and oriented to person, place, and time. Mental status is at baseline.      Motor: No weakness.      Gait: Gait normal.   Psychiatric:         Mood and Affect: Mood normal.         Thought Content: Thought content normal.        Result Review :   The following data was reviewed by: Aleksandr Olmstead MD on 08/23/2021:                  Assessment and Plan    Diagnoses and all orders for this visit:    1. Type 2 diabetes mellitus without complication, without long-term current use of insulin (Primary)  Assessment & Plan:  A1c of 6.5 is certainly at goal as of 7/23, he can continue with just low-dose metformin for this.    Orders:  -     Hemoglobin A1c; Future    2. Mixed hyperlipidemia  Assessment & Plan:  Checking LDL every other visit, it was excellent in the spring, we will repeat it in the fall.  He will continue with just moderate dose pravastatin for now.    Orders:  -     Lipid Panel; Future    3. Essential hypertension  Assessment & Plan:  Blood  pressure stable and his heart rate is in the mid to upper 50s as of his 7/23 office visit.  He can continue with full doses of amlodipine and valsartan along with low doses of doxazosin and metoprolol.    Orders:  -     Comprehensive Metabolic Panel; Future    4. Well adult exam  -     CBC & Differential; Future    5. Left shoulder pain, unspecified chronicity  Assessment & Plan:  This is a new issue as of his 7/23 office visit.  There is no known trauma, but patient is having some pain in it, feels like its out of socket etc.  Significant point tenderness on exam, and his ROM is reasonable.  We will get plain films and ask physical therapy to evaluate, make further recommendations at that time.    Orders:  -     XR Shoulder 2+ View Left; Future  -     Ambulatory Referral to Physical Therapy Evaluate and treat         --  --  Older notes:  VISIT 4/21:   ANNUAL MEDICARE WELLNESS EXAM 12/20 = reviewed all forms with pt; no new discoveries.  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  --  DM stable on only 500 qd and that is fine...6.3...6.9 and I d/w bad trend...6.9 is holding on 500 qd only... 7.4 and rec 1000 qd again...7.0...7.4 is due to the steroids, and they are being weaned, so no med changes made 9/19...6.9 is back down off steroids...7.5 and will increase on RTO if same/higher = only on 500 qd recall...6.9 on 1000 qAM as of 8/20 OV---> 7.1 is fine 4/21. (TSH neg 4/21).  --  LIPIDS stable 3/14 w/o treatment...elevated CK without statins anyhow...defer... holding steady...122 and will add statin now...78, but CK up and c/o joints at 5/18 OV, so will hold Pravastatin 10 mg for  now...86 is fine...96=ok for now---> 84 in 4/20.  --  HTN with need for checks at home before increase Exforge to max dose as of 2/18 OV... BP stable off HCTZ and on toprol...required increase exforge per Karyn; BP great 4/19 OV despite back on Naproxen past week---> stable 4/21.  --  SYNCOPE 2/18 and to ER for 5 hrs; had neg V/Q; did have aura, so most c/w vasovagal; exam neg; will get ECHO/Dopplers/tilt to complete w/u...ECHO is as below, the Carotids 3/18 were neg, and TILT + for NEUROCARDIOGENIC SYNCOPE=will try to lose HCTZ and add in low dose of toprol; pt to monitor BP and call with results; d/w keep hydrated/heed warning signs...c/o dizziness with rapid head mvmt and rec eval per ENT now...got better on it's own.  ASCENDING AORTIC DILATATION=3.8 cm per ECHO 3/18 (d/w nl 3.5 or less, dilated 3.5 to 4.5, aneurysmal is above 4.4, and surgery above 5.5).  --  DJD with L knee/shoulder requiring MRI's...increased sxs 5/18, so holding statin...c/o knee again 12/18, but doesn't sound severe; OTC meds for now...I was unaware of bump in BP, so I started Naprosyn back a week ago...on prednisone as of 6/19 OV with Dr Mcgovern following now...ESR down 86 to 9 on prednisone as of 9/19 OV...still being weaned as of 12/19 OV and I d/w add tylenol to help offset the prednisone wean.  --  H PYLORI +...d/w tx plan with prilosec 20 mg/amoxil 1000mg/biaxin 500 mg all bid for 14 days...tolerated this.  GERD/BURPPING was better when he was on PPI for above, so try qod...no c/o 10/17.  GALLSTONES noted per below eval and will eval if persistent.  R ABD PAIN to ER, was nauseated, CT=? liver cysts and L renal lesion, he's concerned, so will get MRI 2/16... MRI 3/17 is with stable cysts.   --  BPH with intol to cardura prior, slow stream but no other sx's...defer for now.   --  --  PSA 2.2 (3/14/2023)  COLON 8/22 = Cologuard negative  PNEUMOVAX #1 12/13, Prevnar '16; rec Hep A 5/18.  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    Follow Up   Return in about 4 months (around 11/28/2023).  Patient was given instructions and counseling regarding his condition or for health maintenance advice. Please see specific information pulled into the AVS if appropriate.

## 2023-07-28 NOTE — ASSESSMENT & PLAN NOTE
Blood pressure stable and his heart rate is in the mid to upper 50s as of his 7/23 office visit.  He can continue with full doses of amlodipine and valsartan along with low doses of doxazosin and metoprolol.

## 2023-07-28 NOTE — ASSESSMENT & PLAN NOTE
Checking LDL every other visit, it was excellent in the spring, we will repeat it in the fall.  He will continue with just moderate dose pravastatin for now.

## 2023-07-28 NOTE — ASSESSMENT & PLAN NOTE
A1c of 6.5 is certainly at goal as of 7/23, he can continue with just low-dose metformin for this.

## 2023-07-30 DIAGNOSIS — D50.9 IRON DEFICIENCY ANEMIA, UNSPECIFIED IRON DEFICIENCY ANEMIA TYPE: Primary | ICD-10-CM

## 2023-07-30 NOTE — TELEPHONE ENCOUNTER
He was just seen in office.  He was not dehydrated, his blood sugars are well controlled, and his thyroid function is normal.  I put orders in to check for possible anemia, although he had no sxs concerning for this other than what she is mentioning.  If he wants to do these labs, they can be done anytime, non-fasting.

## 2023-07-31 RX ORDER — AMLODIPINE AND VALSARTAN 10; 320 MG/1; MG/1
TABLET ORAL
Qty: 90 TABLET | Refills: 1 | Status: SHIPPED | OUTPATIENT
Start: 2023-07-31

## 2023-07-31 RX ORDER — OMEPRAZOLE 40 MG/1
CAPSULE, DELAYED RELEASE ORAL
Qty: 180 CAPSULE | Refills: 1 | Status: SHIPPED | OUTPATIENT
Start: 2023-07-31

## 2023-08-01 ENCOUNTER — LAB (OUTPATIENT)
Dept: LAB | Facility: HOSPITAL | Age: 73
End: 2023-08-01
Payer: MEDICARE

## 2023-08-01 DIAGNOSIS — D50.9 IRON DEFICIENCY ANEMIA, UNSPECIFIED IRON DEFICIENCY ANEMIA TYPE: ICD-10-CM

## 2023-08-01 LAB
BASOPHILS # BLD AUTO: 0.02 10*3/MM3 (ref 0–0.2)
BASOPHILS NFR BLD AUTO: 0.3 % (ref 0–1.5)
DEPRECATED RDW RBC AUTO: 42 FL (ref 37–54)
EOSINOPHIL # BLD AUTO: 0.15 10*3/MM3 (ref 0–0.4)
EOSINOPHIL NFR BLD AUTO: 2.2 % (ref 0.3–6.2)
ERYTHROCYTE [DISTWIDTH] IN BLOOD BY AUTOMATED COUNT: 12.8 % (ref 12.3–15.4)
FERRITIN SERPL-MCNC: 166 NG/ML (ref 30–400)
HCT VFR BLD AUTO: 36.5 % (ref 37.5–51)
HGB BLD-MCNC: 12.2 G/DL (ref 13–17.7)
IMM GRANULOCYTES # BLD AUTO: 0.02 10*3/MM3 (ref 0–0.05)
IMM GRANULOCYTES NFR BLD AUTO: 0.3 % (ref 0–0.5)
IRON 24H UR-MRATE: 51 MCG/DL (ref 59–158)
IRON SATN MFR SERPL: 15 % (ref 20–50)
LYMPHOCYTES # BLD AUTO: 1.62 10*3/MM3 (ref 0.7–3.1)
LYMPHOCYTES NFR BLD AUTO: 24 % (ref 19.6–45.3)
MCH RBC QN AUTO: 29.9 PG (ref 26.6–33)
MCHC RBC AUTO-ENTMCNC: 33.4 G/DL (ref 31.5–35.7)
MCV RBC AUTO: 89.5 FL (ref 79–97)
MONOCYTES # BLD AUTO: 0.53 10*3/MM3 (ref 0.1–0.9)
MONOCYTES NFR BLD AUTO: 7.9 % (ref 5–12)
NEUTROPHILS NFR BLD AUTO: 4.4 10*3/MM3 (ref 1.7–7)
NEUTROPHILS NFR BLD AUTO: 65.3 % (ref 42.7–76)
NRBC BLD AUTO-RTO: 0 /100 WBC (ref 0–0.2)
PLATELET # BLD AUTO: 278 10*3/MM3 (ref 140–450)
PMV BLD AUTO: 10.1 FL (ref 6–12)
RBC # BLD AUTO: 4.08 10*6/MM3 (ref 4.14–5.8)
TIBC SERPL-MCNC: 338 MCG/DL (ref 298–536)
TRANSFERRIN SERPL-MCNC: 227 MG/DL (ref 200–360)
WBC NRBC COR # BLD: 6.74 10*3/MM3 (ref 3.4–10.8)

## 2023-08-01 PROCEDURE — 36415 COLL VENOUS BLD VENIPUNCTURE: CPT

## 2023-08-01 PROCEDURE — 83540 ASSAY OF IRON: CPT

## 2023-08-01 PROCEDURE — 82728 ASSAY OF FERRITIN: CPT

## 2023-08-01 PROCEDURE — 85025 COMPLETE CBC W/AUTO DIFF WBC: CPT

## 2023-08-01 PROCEDURE — 84466 ASSAY OF TRANSFERRIN: CPT

## 2023-08-01 NOTE — TELEPHONE ENCOUNTER
Spoke with patient daughter rely message, she verbalized understanding. She wanted you to know pt did turn on his A/C

## 2023-08-02 ENCOUNTER — TELEPHONE (OUTPATIENT)
Dept: INTERNAL MEDICINE | Facility: CLINIC | Age: 73
End: 2023-08-02
Payer: MEDICARE

## 2023-08-02 DIAGNOSIS — D50.8 IRON DEFICIENCY ANEMIA SECONDARY TO INADEQUATE DIETARY IRON INTAKE: Primary | ICD-10-CM

## 2023-08-03 ENCOUNTER — TELEPHONE (OUTPATIENT)
Dept: INTERNAL MEDICINE | Facility: CLINIC | Age: 73
End: 2023-08-03
Payer: MEDICARE

## 2023-08-06 ENCOUNTER — LAB (OUTPATIENT)
Dept: LAB | Facility: HOSPITAL | Age: 73
End: 2023-08-06
Payer: MEDICARE

## 2023-08-06 DIAGNOSIS — D50.8 IRON DEFICIENCY ANEMIA SECONDARY TO INADEQUATE DIETARY IRON INTAKE: ICD-10-CM

## 2023-08-06 LAB — HEMOCCULT STL QL IA: NEGATIVE

## 2023-08-06 PROCEDURE — 82274 ASSAY TEST FOR BLOOD FECAL: CPT

## 2023-09-01 NOTE — TELEPHONE ENCOUNTER
Rx Refill Note  Requested Prescriptions     Pending Prescriptions Disp Refills    sertraline (ZOLOFT) 50 MG tablet 90 tablet 1     Sig: Take 1 tablet by mouth Daily.      Last office visit with prescribing clinician: 7/28/2023   Last telemedicine visit with prescribing clinician: Visit date not found   Next office visit with prescribing clinician: 11/28/2023                         Would you like a call back once the refill request has been completed: [] Yes [] No    If the office needs to give you a call back, can they leave a voicemail: [] Yes [] No    Betty Moraes MA  09/01/23, 11:52 EDT

## 2023-09-05 RX ORDER — METFORMIN HYDROCHLORIDE 500 MG/1
1000 TABLET, EXTENDED RELEASE ORAL
Qty: 180 TABLET | Refills: 1 | Status: SHIPPED | OUTPATIENT
Start: 2023-09-05

## 2023-09-05 RX ORDER — METOPROLOL SUCCINATE 25 MG/1
TABLET, EXTENDED RELEASE ORAL
Qty: 45 TABLET | Refills: 1 | Status: SHIPPED | OUTPATIENT
Start: 2023-09-05

## 2023-09-15 RX ORDER — TRAZODONE HYDROCHLORIDE 50 MG/1
TABLET ORAL
Qty: 90 TABLET | Refills: 1 | Status: SHIPPED | OUTPATIENT
Start: 2023-09-15

## 2023-09-15 NOTE — TELEPHONE ENCOUNTER
Rx Refill Note  Requested Prescriptions      No prescriptions requested or ordered in this encounter      Last office visit with prescribing clinician: 7/28/2023   Last telemedicine visit with prescribing clinician: Visit date not found   Next office visit with prescribing clinician: 11/28/2023                         Would you like a call back once the refill request has been completed: [] Yes [] No    If the office needs to give you a call back, can they leave a voicemail: [] Yes [] No    Betty Moraes MA  09/15/23, 10:35 EDT

## 2023-09-19 ENCOUNTER — OFFICE VISIT (OUTPATIENT)
Dept: INTERNAL MEDICINE | Facility: CLINIC | Age: 73
End: 2023-09-19
Payer: MEDICARE

## 2023-09-19 VITALS
HEIGHT: 78 IN | WEIGHT: 259.4 LBS | SYSTOLIC BLOOD PRESSURE: 140 MMHG | DIASTOLIC BLOOD PRESSURE: 68 MMHG | BODY MASS INDEX: 30.01 KG/M2 | TEMPERATURE: 97.8 F | HEART RATE: 63 BPM | OXYGEN SATURATION: 97 %

## 2023-09-19 DIAGNOSIS — M25.562 CHRONIC PAIN OF LEFT KNEE: Primary | ICD-10-CM

## 2023-09-19 DIAGNOSIS — Z23 NEED FOR VACCINATION: ICD-10-CM

## 2023-09-19 DIAGNOSIS — I10 ESSENTIAL HYPERTENSION: ICD-10-CM

## 2023-09-19 DIAGNOSIS — G89.29 CHRONIC PAIN OF LEFT KNEE: Primary | ICD-10-CM

## 2023-09-19 LAB
ERYTHROCYTE [SEDIMENTATION RATE] IN BLOOD: 3 MM/HR (ref 0–20)
URATE SERPL-MCNC: 5 MG/DL (ref 3.4–7)

## 2023-09-19 PROCEDURE — 85652 RBC SED RATE AUTOMATED: CPT | Performed by: INTERNAL MEDICINE

## 2023-09-19 PROCEDURE — 84550 ASSAY OF BLOOD/URIC ACID: CPT | Performed by: INTERNAL MEDICINE

## 2023-09-19 RX ORDER — MELOXICAM 7.5 MG/1
TABLET ORAL
Qty: 30 TABLET | Refills: 0 | Status: SHIPPED | OUTPATIENT
Start: 2023-09-19

## 2023-09-19 NOTE — ASSESSMENT & PLAN NOTE
Blood pressure is fine and his pulse is in the mid 60s as of his 9/23 urgent visit.  He will continue with full doses of amlodipine and valsartan as well as low doses of metoprolol and doxazosin.

## 2023-09-19 NOTE — PROGRESS NOTES
"Chief Complaint  Left knee pain (Pt states that his left knee pain, he states that he didn't do anything to it. He has seen Rheumatoid. )    Subjective          Marcio Enrique presents to [facility]     History of present illness:  73-year-old male with underlying hypertension, hyperlipidemia, diabetes, who is coming in 7/23 for routine 4-month follow-up.  We will go over his meds, review his labs in detail, and address any new concerns he may have.---> here 9/23 for urgent issue per CC above.    Review of Systems   Constitutional:  Negative for appetite change, fatigue and fever.   HENT:  Negative for congestion and ear pain.    Eyes:  Negative for blurred vision.   Respiratory:  Negative for cough, chest tightness, shortness of breath and wheezing.    Cardiovascular:  Negative for chest pain, palpitations and leg swelling.   Gastrointestinal:  Negative for abdominal pain.   Genitourinary:  Negative for difficulty urinating, dysuria and hematuria.   Musculoskeletal:  Negative for arthralgias and gait problem.   Skin:  Negative for skin lesions.   Neurological:  Negative for syncope, memory problem and confusion.   Psychiatric/Behavioral:  Negative for self-injury and depressed mood.      Objective   Vital Signs:   /68   Pulse 63   Temp 97.8 °F (36.6 °C) (Skin)   Ht 198.1 cm (77.99\")   Wt 118 kg (259 lb 6.4 oz)   SpO2 97%   BMI 29.98 kg/m²           Physical Exam  Vitals and nursing note reviewed.   Constitutional:       General: He is not in acute distress.     Appearance: Normal appearance. He is not toxic-appearing.   HENT:      Head: Atraumatic.      Right Ear: External ear normal.      Left Ear: External ear normal.      Nose: Nose normal.      Mouth/Throat:      Mouth: Mucous membranes are moist.   Eyes:      General:         Right eye: No discharge.         Left eye: No discharge.      Extraocular Movements: Extraocular movements intact.      Pupils: Pupils are " equal, round, and reactive to light.   Cardiovascular:      Rate and Rhythm: Normal rate and regular rhythm.      Pulses: Normal pulses.      Heart sounds: Normal heart sounds. No murmur heard.    No gallop.   Pulmonary:      Effort: Pulmonary effort is normal. No respiratory distress.      Breath sounds: No wheezing, rhonchi or rales.   Abdominal:      General: There is no distension.      Palpations: Abdomen is soft. There is no mass.      Tenderness: There is no abdominal tenderness. There is no guarding.   Musculoskeletal:         General: No swelling or tenderness.      Cervical back: No tenderness.      Right lower leg: No edema.      Left lower leg: No edema.   Skin:     General: Skin is warm and dry.      Findings: No rash.   Neurological:      General: No focal deficit present.      Mental Status: He is alert and oriented to person, place, and time. Mental status is at baseline.      Motor: No weakness.      Gait: Gait normal.   Psychiatric:         Mood and Affect: Mood normal.         Thought Content: Thought content normal.        Result Review :   The following data was reviewed by: Aleksandr Olmstead MD on 08/23/2021:                  Assessment and Plan    There are no diagnoses linked to this encounter.       --  --  Older notes:  VISIT 4/21:   ANNUAL MEDICARE WELLNESS EXAM 12/20 = reviewed all forms with pt; no new discoveries.  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  --  DM stable on only 500 qd and that is fine...6.3...6.9 and I d/w bad trend...6.9 is holding on 500 qd only... 7.4 and rec 1000 qd again...7.0...7.4 is due to the steroids, and they are being weaned, so no  med changes made 9/19...6.9 is back down off steroids...7.5 and will increase on RTO if same/higher = only on 500 qd recall...6.9 on 1000 qAM as of 8/20 OV---> 7.1 is fine 4/21. (TSH neg 4/21).  --  LIPIDS stable 3/14 w/o treatment...elevated CK without statins anyhow...defer... holding steady...122 and will add statin now...78, but CK up and c/o joints at 5/18 OV, so will hold Pravastatin 10 mg for now...86 is fine...96=ok for now---> 84 in 4/20.  --  HTN with need for checks at home before increase Exforge to max dose as of 2/18 OV... BP stable off HCTZ and on toprol...required increase exforge per Karyn; BP great 4/19 OV despite back on Naproxen past week---> stable 4/21.  --  SYNCOPE 2/18 and to ER for 5 hrs; had neg V/Q; did have aura, so most c/w vasovagal; exam neg; will get ECHO/Dopplers/tilt to complete w/u...ECHO is as below, the Carotids 3/18 were neg, and TILT + for NEUROCARDIOGENIC SYNCOPE=will try to lose HCTZ and add in low dose of toprol; pt to monitor BP and call with results; d/w keep hydrated/heed warning signs...c/o dizziness with rapid head mvmt and rec eval per ENT now...got better on it's own.  ASCENDING AORTIC DILATATION=3.8 cm per ECHO 3/18 (d/w nl 3.5 or less, dilated 3.5 to 4.5, aneurysmal is above 4.4, and surgery above 5.5).  --  DJD with L knee/shoulder requiring MRI's...increased sxs 5/18, so holding statin...c/o knee again 12/18, but doesn't sound severe; OTC meds for now...I was unaware of bump in BP, so I started Naprosyn back a week ago...on prednisone as of 6/19 OV with Dr Mcgovern following now...ESR down 86 to 9 on prednisone as of 9/19 OV...still being weaned as of 12/19 OV and I d/w add tylenol to help offset the prednisone wean.  --  H PYLORI +...d/w tx plan with prilosec 20 mg/amoxil 1000mg/biaxin 500 mg all bid for 14 days...tolerated this.  GERD/BURPPING was better when he was on PPI for above, so try qod...no c/o 10/17.  GALLSTONES noted per below eval and will eval  if persistent.  R ABD PAIN to ER, was nauseated, CT=? liver cysts and L renal lesion, he's concerned, so will get MRI 2/16... MRI 3/17 is with stable cysts.   --  BPH with intol to cardura prior, slow stream but no other sx's...defer for now.   --  --  PSA 2.2 (3/14/2023)  COLON 8/22 = Cologuard negative  PNEUMOVAX #1 12/13, Prevnar '16; rec Hep A 5/18.  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    Follow Up   No follow-ups on file.  Patient was given instructions and counseling regarding his condition or for health maintenance advice. Please see specific information pulled into the AVS if appropriate.

## 2023-09-19 NOTE — ASSESSMENT & PLAN NOTE
This is indication for his 9/23 urgent visit.  He has had issues with this in the past dating back to at least 2018, had MRI previously.  Additionally and curiously he was seen by Dr. Mcgovern previously and had elevated sed rate that was treated with prednisone.    Patient with no recent trauma, he has had prior open procedure on this knee.  There is no obvious effusion, he does have some laxity and decreased ROM, he cannot extend it fully.    We will repeat his sed rate, get a uric acid as well, treat with short course of nonsteroidal, and get him back in with orthopedics if not improving as expected.

## 2023-09-22 ENCOUNTER — TELEPHONE (OUTPATIENT)
Dept: INTERNAL MEDICINE | Facility: CLINIC | Age: 73
End: 2023-09-22
Payer: MEDICARE

## 2023-09-22 DIAGNOSIS — M25.562 ACUTE PAIN OF LEFT KNEE: Primary | ICD-10-CM

## 2023-09-22 NOTE — TELEPHONE ENCOUNTER
Pt states that his knee is no better, He states that it feels that he is being stabbed with needles. He states that heat is helping some what. Please advise of what else he can do.   He has been taking the Mobic.

## 2023-10-02 ENCOUNTER — OFFICE VISIT (OUTPATIENT)
Dept: ORTHOPEDIC SURGERY | Facility: CLINIC | Age: 73
End: 2023-10-02
Payer: MEDICARE

## 2023-10-02 ENCOUNTER — PREP FOR SURGERY (OUTPATIENT)
Dept: OTHER | Facility: HOSPITAL | Age: 73
End: 2023-10-02
Payer: MEDICARE

## 2023-10-02 VITALS
HEIGHT: 77 IN | HEART RATE: 64 BPM | DIASTOLIC BLOOD PRESSURE: 82 MMHG | OXYGEN SATURATION: 98 % | SYSTOLIC BLOOD PRESSURE: 127 MMHG | BODY MASS INDEX: 31.53 KG/M2 | WEIGHT: 267 LBS

## 2023-10-02 DIAGNOSIS — M17.12 OSTEOARTHRITIS OF LEFT KNEE, UNSPECIFIED OSTEOARTHRITIS TYPE: Primary | ICD-10-CM

## 2023-10-02 DIAGNOSIS — M25.562 LEFT KNEE PAIN, UNSPECIFIED CHRONICITY: Primary | ICD-10-CM

## 2023-10-02 DIAGNOSIS — M17.12 OSTEOARTHRITIS OF LEFT KNEE, UNSPECIFIED OSTEOARTHRITIS TYPE: ICD-10-CM

## 2023-10-02 DIAGNOSIS — Z47.1 AFTERCARE FOLLOWING LEFT KNEE JOINT REPLACEMENT SURGERY: Primary | ICD-10-CM

## 2023-10-02 DIAGNOSIS — Z96.652 AFTERCARE FOLLOWING LEFT KNEE JOINT REPLACEMENT SURGERY: Primary | ICD-10-CM

## 2023-10-02 RX ORDER — TRANEXAMIC ACID 10 MG/ML
1000 INJECTION, SOLUTION INTRAVENOUS ONCE
OUTPATIENT
Start: 2023-10-02 | End: 2023-10-02

## 2023-10-02 RX ORDER — CEFAZOLIN SODIUM 2 G/100ML
2 INJECTION, SOLUTION INTRAVENOUS ONCE
OUTPATIENT
Start: 2023-10-02 | End: 2023-10-02

## 2023-10-02 RX ORDER — CEFAZOLIN SODIUM IN 0.9 % NACL 3 G/100 ML
3 INTRAVENOUS SOLUTION, PIGGYBACK (ML) INTRAVENOUS ONCE
OUTPATIENT
Start: 2023-10-02 | End: 2023-10-02

## 2023-10-02 NOTE — PROGRESS NOTES
"Chief Complaint  Initial Evaluation of the Left Knee     Subjective      Marcio Enrique presents to [facility] for initial evaluation of the left knee. He had a knee surgery of the left knee 45 years ago of a lateral meniscectomy.   He has had pain off and on for years.  The pain is around the entire knee.  He has had injections, anti inflammatory and exercises.  He has difficulty with prolonged ambulation, stairs and daily tasks.      No Known Allergies     Social History     Socioeconomic History    Marital status:    Tobacco Use    Smoking status: Never    Smokeless tobacco: Never   Vaping Use    Vaping Use: Never used   Substance and Sexual Activity    Alcohol use: No    Drug use: No    Sexual activity: Defer        I reviewed the patient's chief complaint, history of present illness, review of systems, past medical history, surgical history, family history, social history, medications, and allergy list.     Review of Systems     Constitutional: Denies fevers, chills, weight loss  Cardiovascular: Denies chest pain, shortness of breath  Skin: Denies rashes, acute skin changes  Neurologic: Denies headache, loss of consciousness        Vital Signs:   /82   Pulse 64   Ht 195.6 cm (77\")   Wt 121 kg (267 lb)   SpO2 98%   BMI 31.66 kg/m²          Physical Exam  General: Alert. No acute distress    Ortho Exam        LEFT KNEE Flexion 110. Extension -10. Stable to varus/valgus stress. Stable to anterior/posterior drawer. Neurovascularly intact. Negative Lila. Negative Lachman. Positive EHL, FHL, HS and TA. Sensation intact to light touch all 5 nerves of the foot. Ambulates with Antalgic gait. Patella is well tracking. Calf supple, non-tender. Positive tenderness to the medial joint line. Positive tenderness to the lateral joint line. Positive Crepitus. Good strength to hamstrings, quadriceps, dorsiflexors, and plantar flexors.  Knee Extensor Mechanism intact.  Severe " Varus deformity.         Procedures        Imaging Results (Most Recent)       Procedure Component Value Units Date/Time    XR Knee 3 View Left [[id number]] Resulted: 10/02/23 0857     Updated: 10/02/23 0906             Result Review :     X-Ray Report:  Left knee X-Ray  Indication: Evaluation of the left knee  AP/Lateral and [place] view(s)  Findings: Advanced degenerative arthritis with end stage bone on bone.    Prior studies available for comparison: no             Assessment and Plan     Diagnoses and all orders for this visit:    1. Left knee pain, unspecified chronicity (Primary)  -     XR Knee 3 View Left    2. Osteoarthritis of left knee, unspecified osteoarthritis type        Discussed the treatment plan with the patient. I reviewed the X-rays that were obtained today with the patient.     Discussed the treatment options with the patient, operative vs non-operative. The patient is a candidate for a left total knee arthroplasty.     The patient expressed understanding and wished to proceed with a left total knee arthroplasty     Discussed surgery., Risks/benefits discussed with patient including, but not limited to: infection, bleeding, neurovascular damage, re-rupture, aesthetic deformity, need for further surgery, and death., Discussed with patient the implant type being used during surgery and patient understands., Surgery pamphlet given., Call or return if worsening symptoms., and DME order for a 3 in 1 given today due to patient will be confined to one room/level of the home that does not offer a toilet during postop recovery.     Follow Up     2 weeks postoperatively       Patient was given instructions and counseling regarding his condition or for health maintenance advice. Please see specific information pulled into the AVS if appropriate.     Scribed for Gurinder Figueroa MD by Rita Terrazas MA.  10/02/23   08:53 EDT    I have personally performed the services described in this document as scribed  by the above individual and it is both accurate and complete. Gurinder Figueroa MD 10/02/23

## 2023-10-16 ENCOUNTER — PRE-ADMISSION TESTING (OUTPATIENT)
Dept: PREADMISSION TESTING | Facility: HOSPITAL | Age: 73
End: 2023-10-16
Payer: MEDICARE

## 2023-10-16 VITALS
RESPIRATION RATE: 16 BRPM | TEMPERATURE: 97.2 F | WEIGHT: 254.19 LBS | HEIGHT: 77 IN | OXYGEN SATURATION: 100 % | SYSTOLIC BLOOD PRESSURE: 128 MMHG | BODY MASS INDEX: 30.01 KG/M2 | DIASTOLIC BLOOD PRESSURE: 74 MMHG | HEART RATE: 58 BPM

## 2023-10-16 DIAGNOSIS — Z00.00 WELL ADULT EXAM: ICD-10-CM

## 2023-10-16 DIAGNOSIS — M17.12 OSTEOARTHRITIS OF LEFT KNEE, UNSPECIFIED OSTEOARTHRITIS TYPE: ICD-10-CM

## 2023-10-16 LAB
ALBUMIN SERPL-MCNC: 4 G/DL (ref 3.5–5.2)
ALBUMIN/GLOB SERPL: 1.5 G/DL
ALP SERPL-CCNC: 72 U/L (ref 39–117)
ALT SERPL W P-5'-P-CCNC: 14 U/L (ref 1–41)
ANION GAP SERPL CALCULATED.3IONS-SCNC: 9.1 MMOL/L (ref 5–15)
AST SERPL-CCNC: 15 U/L (ref 1–40)
BASOPHILS # BLD AUTO: 0.01 10*3/MM3 (ref 0–0.2)
BASOPHILS NFR BLD AUTO: 0.2 % (ref 0–1.5)
BILIRUB SERPL-MCNC: 0.2 MG/DL (ref 0–1.2)
BUN SERPL-MCNC: 15 MG/DL (ref 8–23)
BUN/CREAT SERPL: 13.2 (ref 7–25)
CALCIUM SPEC-SCNC: 9.5 MG/DL (ref 8.6–10.5)
CHLORIDE SERPL-SCNC: 106 MMOL/L (ref 98–107)
CO2 SERPL-SCNC: 25.9 MMOL/L (ref 22–29)
CREAT SERPL-MCNC: 1.14 MG/DL (ref 0.76–1.27)
DEPRECATED RDW RBC AUTO: 41.5 FL (ref 37–54)
EGFRCR SERPLBLD CKD-EPI 2021: 67.1 ML/MIN/1.73
EOSINOPHIL # BLD AUTO: 0.25 10*3/MM3 (ref 0–0.4)
EOSINOPHIL NFR BLD AUTO: 4.6 % (ref 0.3–6.2)
ERYTHROCYTE [DISTWIDTH] IN BLOOD BY AUTOMATED COUNT: 12.6 % (ref 12.3–15.4)
GLOBULIN UR ELPH-MCNC: 2.7 GM/DL
GLUCOSE SERPL-MCNC: 145 MG/DL (ref 65–99)
HBA1C MFR BLD: 6.4 % (ref 4.8–5.6)
HCT VFR BLD AUTO: 38.7 % (ref 37.5–51)
HGB BLD-MCNC: 12.4 G/DL (ref 13–17.7)
IMM GRANULOCYTES # BLD AUTO: 0.01 10*3/MM3 (ref 0–0.05)
IMM GRANULOCYTES NFR BLD AUTO: 0.2 % (ref 0–0.5)
INR PPP: 0.99 (ref 0.86–1.15)
LYMPHOCYTES # BLD AUTO: 1.5 10*3/MM3 (ref 0.7–3.1)
LYMPHOCYTES NFR BLD AUTO: 27.6 % (ref 19.6–45.3)
MCH RBC QN AUTO: 29.1 PG (ref 26.6–33)
MCHC RBC AUTO-ENTMCNC: 32 G/DL (ref 31.5–35.7)
MCV RBC AUTO: 90.8 FL (ref 79–97)
MONOCYTES # BLD AUTO: 0.46 10*3/MM3 (ref 0.1–0.9)
MONOCYTES NFR BLD AUTO: 8.5 % (ref 5–12)
NEUTROPHILS NFR BLD AUTO: 3.2 10*3/MM3 (ref 1.7–7)
NEUTROPHILS NFR BLD AUTO: 58.9 % (ref 42.7–76)
NRBC BLD AUTO-RTO: 0 /100 WBC (ref 0–0.2)
PLATELET # BLD AUTO: 252 10*3/MM3 (ref 140–450)
PMV BLD AUTO: 10 FL (ref 6–12)
POTASSIUM SERPL-SCNC: 4.4 MMOL/L (ref 3.5–5.2)
PROT SERPL-MCNC: 6.7 G/DL (ref 6–8.5)
PROTHROMBIN TIME: 13.2 SECONDS (ref 11.8–14.9)
RBC # BLD AUTO: 4.26 10*6/MM3 (ref 4.14–5.8)
SODIUM SERPL-SCNC: 141 MMOL/L (ref 136–145)
WBC NRBC COR # BLD: 5.43 10*3/MM3 (ref 3.4–10.8)

## 2023-10-16 PROCEDURE — 83036 HEMOGLOBIN GLYCOSYLATED A1C: CPT

## 2023-10-16 PROCEDURE — 93005 ELECTROCARDIOGRAM TRACING: CPT

## 2023-10-16 PROCEDURE — 80053 COMPREHEN METABOLIC PANEL: CPT

## 2023-10-16 PROCEDURE — 36415 COLL VENOUS BLD VENIPUNCTURE: CPT

## 2023-10-16 PROCEDURE — 85610 PROTHROMBIN TIME: CPT

## 2023-10-16 PROCEDURE — 85025 COMPLETE CBC W/AUTO DIFF WBC: CPT

## 2023-10-16 RX ORDER — MULTIPLE VITAMINS W/ MINERALS TAB 9MG-400MCG
1 TAB ORAL DAILY
COMMUNITY

## 2023-10-16 NOTE — DISCHARGE INSTRUCTIONS
IMPORTANT INSTRUCTIONS - PRE-ADMISSION TESTING  DO NOT EAT OR CHEW anything after midnight the night before your procedure.    You may have CLEAR liquids up to ___3__ hours prior to ARRIVAL time.   Take the following medications the morning of your procedure with JUST A SIP OF WATER:        ZOLOFT, PRAVASTATIN, METOPROLOL, CARDURA, IRON, PRILOSEC     DO NOT BRING your medications to the hospital with you, UNLESS something has changed since your PRE-Admission Testing appointment.  Hold all vitamins, supplements, and NSAIDS (Non- steroidal anti-inflammatory meds) for one week prior to surgery (you MAY take Tylenol or Acetaminophen). STOP 10-17-23           STOP: MULTIVITAMIN     If you are diabetic, check your blood sugar the morning of your procedure. If it is less than 70 or if you are feeling symptomatic, call the following number for further instructions: [phone].  Use your inhalers/nebulizers as usual, the morning of your procedure. BRING YOUR INHALERS with you.   Bring your CPAP or BIPAP to hospital, ONLY IF YOU WILL BE SPENDING THE NIGHT.   Make sure you have a ride home and have someone who will stay with you the day of your procedure after you go home.  If you have any questions, please call your Pre-Admission Testing Nurse, __MARYAM FERNANDEZ_ at [phone]__.   Per anesthesia request, do not smoke for 24 hours before your procedure or as instructed by your surgeon.      SURGERY 10-24-23 ELEVATOR A, THIRD FLOOR  WILL CALL YOU THE DAY PRIOR TO SURGERY AFTER 1PM   DRINK 20 OZ GATORADE 3 HOURS PRIOR TO SURGERY (NO RED)   USE SURGICAL SOAP 3 TIMES PRIOR TO SURGERY (AS INSTRUCTED BELOW)   FOLLOW ANY INSTRUCTIONS GIVEN BY SURGEON'S OFFICE.   DO NOT TAKE YOUR METFORMIN DAY OF THE SURGERY ON 10-24-23      PREOPERATIVE (BEFORE SURGERY)              BATHING INSTRUCTIONS  Instructions:    You will need to shower 3 separate times utilizing the soap provided; at the times indicated   below:     10-22-23 CARMEN PM    10-23-23 MONDAY AM  10-23-23 MONDAY PM     Wash your hair and face with normal shampoo and soap, rinse it well before using the surgical soap.      In the shower, wet the skin completely with water from your neck to your feet. Apply the cleanser to your   body ONLY FROM THE NECK TO YOUR FEET.     Do NOT USE THE CLEANSER ON YOUR FACE, HEAD, OR GENITAL (PRIVATE) AREAS.   Keep it out of your eyes, ears, and mouth because of the risk of injury to those areas.      Scrub with a clean washcloth for each bath utilizing the soap provided from the top of your body to the   bottom starting at the neck area.      Pay close attention to your armpits, groin area, and the site of surgery.      Wash your body gently for 5 minutes. Stand outside the stream or turn off the water while scrubbing your   body. Do NOT wash with your regular soap after the surgical cleanser is used.      RINSE THE CLEANSER OFF COMPLETELY with plenty of water. Rinse the area again thoroughly.      Dry off with a clean towel. The surgical soap can cause dryness; however do NOT APPLY LOTION,   CREAM, POWDER, and/or DEODORANT AFTER SHOWERING.     Be sure to where clean clothes after showering.      Ensure CLEAN BED LINENS AFTER FIRST wash with the surgical soap. - SUNDAY PM     NO PETS ALLOWED IN THE BED with you after utilizing the surgical soap.    Clear Liquid Diet        Find out when you need to start a clear liquid diet.   Think of “clear liquids” as anything you could read a newspaper through. This includes things like water, broth, sports drinks, or tea WITHOUT any kind of milk or cream.           Once you are told to start a clear liquid diet, only drink these things until 3 hours before arrival to the hospital or when the hospital says to stop. Total volume limitation: 8 oz.       Clear liquids you CAN drink:   Water   Clear broth: beef, chicken, vegetable, or bone broth with nothing in it   Gatorade   Lemonade or Jacob-aid   Soda   Tea, coffee  (NO cream or honey)   Jell-O (without fruit)   Popsicles (without fruit or cream)   Italian ices   Juice without pulp: apple, white, grape   You may use salt, pepper, and sugar  NO RED  NO NOODLES    Do NOT drink:   Milk or cream   Soy milk, almond milk, coconut milk, or other non-dairy drinks and   creamers   Milkshakes or smoothies   Tomato juice   Orange juice   Grapefruit juice   Cream soups or any other than broth         Clear Liquid Diet:  Do NOT eat any solid food.  Do NOT eat or suck on mints or candy.  Do NOT chew gum.  Do NOT drink thick liquids like milk or juice with pulp in it.  Do NOT add milk, cream, or anything like soy milk or almond milk to coffee or tea.

## 2023-10-16 NOTE — SIGNIFICANT NOTE
PATIENT WOULD LIKE TO THERAPY AT [facility] IN [place], [place]. PT STATED HIS SON AND TRANSPORT HIM TO AND FROM THERAPY.    GOAL: HAVE LESS KNEE PAIN    DME ASSISTANCE NEEDED: WALKER, ELEVATED TOILET SEAT, SHOWER CHAIR AND CANE    JOINT CLASS DONE 10-16-23

## 2023-10-18 ENCOUNTER — TELEPHONE (OUTPATIENT)
Dept: ORTHOPEDIC SURGERY | Facility: CLINIC | Age: 73
End: 2023-10-18
Payer: MEDICARE

## 2023-10-18 NOTE — TELEPHONE ENCOUNTER
PATIENT WAS CALLED AND I WAS UNABLE TO LEAVE A MESSAGE.  PATIENT.  PER DR RIDER PATIENT CAN HOLD ASPIRIN FOR 5 DAYS PRIRO TO SURGERY.

## 2023-10-19 LAB
QT INTERVAL: 438 MS
QTC INTERVAL: 397 MS

## 2023-10-23 PROBLEM — M17.12 OSTEOARTHRITIS OF LEFT KNEE: Status: ACTIVE | Noted: 2023-10-02

## 2023-10-24 ENCOUNTER — ANESTHESIA EVENT (OUTPATIENT)
Dept: PERIOP | Facility: HOSPITAL | Age: 73
End: 2023-10-24
Payer: MEDICARE

## 2023-10-24 ENCOUNTER — APPOINTMENT (OUTPATIENT)
Dept: GENERAL RADIOLOGY | Facility: HOSPITAL | Age: 73
End: 2023-10-24
Payer: MEDICARE

## 2023-10-24 ENCOUNTER — ANESTHESIA EVENT CONVERTED (OUTPATIENT)
Dept: ANESTHESIOLOGY | Facility: HOSPITAL | Age: 73
End: 2023-10-24
Payer: MEDICARE

## 2023-10-24 ENCOUNTER — ANESTHESIA (OUTPATIENT)
Dept: PERIOP | Facility: HOSPITAL | Age: 73
End: 2023-10-24
Payer: MEDICARE

## 2023-10-24 ENCOUNTER — HOSPITAL ENCOUNTER (OUTPATIENT)
Facility: HOSPITAL | Age: 73
Discharge: HOME | End: 2023-10-25
Attending: ORTHOPAEDIC SURGERY | Admitting: ORTHOPAEDIC SURGERY
Payer: MEDICARE

## 2023-10-24 DIAGNOSIS — R26.2 DIFFICULTY IN WALKING: ICD-10-CM

## 2023-10-24 DIAGNOSIS — M17.12 PRIMARY OSTEOARTHRITIS OF LEFT KNEE: Primary | ICD-10-CM

## 2023-10-24 DIAGNOSIS — Z78.9 DECREASED ACTIVITIES OF DAILY LIVING (ADL): ICD-10-CM

## 2023-10-24 DIAGNOSIS — M17.12 OSTEOARTHRITIS OF LEFT KNEE, UNSPECIFIED OSTEOARTHRITIS TYPE: ICD-10-CM

## 2023-10-24 LAB
GLUCOSE BLDC GLUCOMTR-MCNC: 103 MG/DL (ref 70–99)
GLUCOSE BLDC GLUCOMTR-MCNC: 145 MG/DL (ref 70–99)

## 2023-10-24 PROCEDURE — 63710000001 ACETAMINOPHEN EXTRA STRENGTH 500 MG TABLET: Performed by: ORTHOPAEDIC SURGERY

## 2023-10-24 PROCEDURE — 20985 CPTR-ASST DIR MS PX: CPT | Performed by: ORTHOPAEDIC SURGERY

## 2023-10-24 PROCEDURE — 25010000002 CEFAZOLIN IN DEXTROSE 2000 MG/ 100 ML SOLUTION: Performed by: ORTHOPAEDIC SURGERY

## 2023-10-24 PROCEDURE — A9270 NON-COVERED ITEM OR SERVICE: HCPCS | Performed by: ANESTHESIOLOGY

## 2023-10-24 PROCEDURE — 25010000002 MORPHINE PER 10 MG: Performed by: ORTHOPAEDIC SURGERY

## 2023-10-24 PROCEDURE — 73560 X-RAY EXAM OF KNEE 1 OR 2: CPT

## 2023-10-24 PROCEDURE — A9270 NON-COVERED ITEM OR SERVICE: HCPCS | Performed by: ORTHOPAEDIC SURGERY

## 2023-10-24 PROCEDURE — 82948 REAGENT STRIP/BLOOD GLUCOSE: CPT

## 2023-10-24 PROCEDURE — 25010000002 CEFAZOLIN IN DEXTROSE 2-4 GM/100ML-% SOLUTION: Performed by: ORTHOPAEDIC SURGERY

## 2023-10-24 PROCEDURE — C1713 ANCHOR/SCREW BN/BN,TIS/BN: HCPCS | Performed by: ORTHOPAEDIC SURGERY

## 2023-10-24 PROCEDURE — 94761 N-INVAS EAR/PLS OXIMETRY MLT: CPT

## 2023-10-24 PROCEDURE — 25010000002 FENTANYL CITRATE (PF) 50 MCG/ML SOLUTION: Performed by: NURSE ANESTHETIST, CERTIFIED REGISTERED

## 2023-10-24 PROCEDURE — 25010000002 ONDANSETRON PER 1 MG: Performed by: NURSE ANESTHETIST, CERTIFIED REGISTERED

## 2023-10-24 PROCEDURE — 63710000001 ACETAMINOPHEN EXTRA STRENGTH 500 MG TABLET: Performed by: ANESTHESIOLOGY

## 2023-10-24 PROCEDURE — 27447 TOTAL KNEE ARTHROPLASTY: CPT | Performed by: ORTHOPAEDIC SURGERY

## 2023-10-24 PROCEDURE — 25010000002 KETOROLAC TROMETHAMINE PER 15 MG: Performed by: ORTHOPAEDIC SURGERY

## 2023-10-24 PROCEDURE — 25010000002 EPINEPHRINE 1 MG/ML SOLUTION: Performed by: ORTHOPAEDIC SURGERY

## 2023-10-24 PROCEDURE — 25010000002 DEXAMETHASONE PER 1 MG: Performed by: NURSE ANESTHETIST, CERTIFIED REGISTERED

## 2023-10-24 PROCEDURE — 0 HYDROMORPHONE 1 MG/ML SOLUTION: Performed by: NURSE ANESTHETIST, CERTIFIED REGISTERED

## 2023-10-24 PROCEDURE — 25010000002 ROPIVACAINE PER 1 MG: Performed by: ORTHOPAEDIC SURGERY

## 2023-10-24 PROCEDURE — C1776 JOINT DEVICE (IMPLANTABLE): HCPCS | Performed by: ORTHOPAEDIC SURGERY

## 2023-10-24 PROCEDURE — 25810000003 LACTATED RINGERS PER 1000 ML: Performed by: ANESTHESIOLOGY

## 2023-10-24 PROCEDURE — S0260 H&P FOR SURGERY: HCPCS | Performed by: ORTHOPAEDIC SURGERY

## 2023-10-24 PROCEDURE — 25010000002 PROPOFOL 10 MG/ML EMULSION: Performed by: NURSE ANESTHETIST, CERTIFIED REGISTERED

## 2023-10-24 PROCEDURE — 63710000001 CELECOXIB 100 MG CAPSULE: Performed by: ANESTHESIOLOGY

## 2023-10-24 PROCEDURE — 25010000002 SUGAMMADEX 200 MG/2ML SOLUTION: Performed by: NURSE ANESTHETIST, CERTIFIED REGISTERED

## 2023-10-24 PROCEDURE — 94799 UNLISTED PULMONARY SVC/PX: CPT

## 2023-10-24 PROCEDURE — 25010000002 MIDAZOLAM PER 1MG: Performed by: ANESTHESIOLOGY

## 2023-10-24 DEVICE — CAP TOTL KN CMT PRIMARY W/ROSA: Type: IMPLANTABLE DEVICE | Site: KNEE | Status: FUNCTIONAL

## 2023-10-24 DEVICE — STEM TIB/KN PERSONA CMT 5D SZH LT: Type: IMPLANTABLE DEVICE | Site: KNEE | Status: FUNCTIONAL

## 2023-10-24 DEVICE — CMT BONE PALACOS R HI/VISC 1X40: Type: IMPLANTABLE DEVICE | Site: KNEE | Status: FUNCTIONAL

## 2023-10-24 DEVICE — IMPLANTABLE DEVICE: Type: IMPLANTABLE DEVICE | Site: KNEE | Status: FUNCTIONAL

## 2023-10-24 DEVICE — ART/SRF KN PERSONA/VE PS MC GH 8TO11 10MM LT: Type: IMPLANTABLE DEVICE | Site: KNEE | Status: FUNCTIONAL

## 2023-10-24 DEVICE — COMP FEM/KN PERSONA CR CMT COCR STD SZ11 LT: Type: IMPLANTABLE DEVICE | Site: KNEE | Status: FUNCTIONAL

## 2023-10-24 RX ORDER — PRAVASTATIN SODIUM 20 MG
20 TABLET ORAL NIGHTLY
Status: DISCONTINUED | OUTPATIENT
Start: 2023-10-25 | End: 2023-10-25 | Stop reason: HOSPADM

## 2023-10-24 RX ORDER — ACETAMINOPHEN 500 MG
1000 TABLET ORAL ONCE
Status: COMPLETED | OUTPATIENT
Start: 2023-10-24 | End: 2023-10-24

## 2023-10-24 RX ORDER — ONDANSETRON 2 MG/ML
4 INJECTION INTRAMUSCULAR; INTRAVENOUS EVERY 6 HOURS PRN
Status: DISCONTINUED | OUTPATIENT
Start: 2023-10-24 | End: 2023-10-25 | Stop reason: HOSPADM

## 2023-10-24 RX ORDER — OXYCODONE HYDROCHLORIDE 5 MG/1
5 TABLET ORAL
Status: DISCONTINUED | OUTPATIENT
Start: 2023-10-24 | End: 2023-10-24 | Stop reason: HOSPADM

## 2023-10-24 RX ORDER — NALOXONE HCL 0.4 MG/ML
0.4 VIAL (ML) INJECTION
Status: DISCONTINUED | OUTPATIENT
Start: 2023-10-24 | End: 2023-10-25 | Stop reason: HOSPADM

## 2023-10-24 RX ORDER — CEFAZOLIN SODIUM 2 G/100ML
2 INJECTION, SOLUTION INTRAVENOUS EVERY 8 HOURS
Qty: 200 ML | Refills: 0 | Status: COMPLETED | OUTPATIENT
Start: 2023-10-24 | End: 2023-10-25

## 2023-10-24 RX ORDER — MIDAZOLAM HYDROCHLORIDE 2 MG/2ML
2 INJECTION, SOLUTION INTRAMUSCULAR; INTRAVENOUS ONCE
Status: COMPLETED | OUTPATIENT
Start: 2023-10-24 | End: 2023-10-24

## 2023-10-24 RX ORDER — BUPIVACAINE HYDROCHLORIDE AND EPINEPHRINE 5; 5 MG/ML; UG/ML
INJECTION, SOLUTION EPIDURAL; INTRACAUDAL; PERINEURAL
Status: COMPLETED | OUTPATIENT
Start: 2023-10-24 | End: 2023-10-24

## 2023-10-24 RX ORDER — HYDROCODONE BITARTRATE AND ACETAMINOPHEN 7.5; 325 MG/1; MG/1
2 TABLET ORAL EVERY 4 HOURS PRN
Status: DISCONTINUED | OUTPATIENT
Start: 2023-10-24 | End: 2023-10-25 | Stop reason: HOSPADM

## 2023-10-24 RX ORDER — BISACODYL 10 MG
10 SUPPOSITORY, RECTAL RECTAL DAILY PRN
Status: DISCONTINUED | OUTPATIENT
Start: 2023-10-24 | End: 2023-10-25 | Stop reason: HOSPADM

## 2023-10-24 RX ORDER — ACETAMINOPHEN 500 MG
1000 TABLET ORAL EVERY 6 HOURS
Status: DISCONTINUED | OUTPATIENT
Start: 2023-10-24 | End: 2023-10-25 | Stop reason: HOSPADM

## 2023-10-24 RX ORDER — FENTANYL CITRATE 50 UG/ML
INJECTION, SOLUTION INTRAMUSCULAR; INTRAVENOUS AS NEEDED
Status: DISCONTINUED | OUTPATIENT
Start: 2023-10-24 | End: 2023-10-24 | Stop reason: SURG

## 2023-10-24 RX ORDER — ONDANSETRON 2 MG/ML
4 INJECTION INTRAMUSCULAR; INTRAVENOUS ONCE AS NEEDED
Status: DISCONTINUED | OUTPATIENT
Start: 2023-10-24 | End: 2023-10-24 | Stop reason: HOSPADM

## 2023-10-24 RX ORDER — EPHEDRINE SULFATE 50 MG/ML
INJECTION, SOLUTION INTRAVENOUS AS NEEDED
Status: DISCONTINUED | OUTPATIENT
Start: 2023-10-24 | End: 2023-10-24 | Stop reason: SURG

## 2023-10-24 RX ORDER — CEFAZOLIN SODIUM 2 G/100ML
2 INJECTION, SOLUTION INTRAVENOUS ONCE
Status: COMPLETED | OUTPATIENT
Start: 2023-10-24 | End: 2023-10-24

## 2023-10-24 RX ORDER — SODIUM CHLORIDE, SODIUM LACTATE, POTASSIUM CHLORIDE, CALCIUM CHLORIDE 600; 310; 30; 20 MG/100ML; MG/100ML; MG/100ML; MG/100ML
80 INJECTION, SOLUTION INTRAVENOUS CONTINUOUS
Status: DISCONTINUED | OUTPATIENT
Start: 2023-10-24 | End: 2023-10-25 | Stop reason: HOSPADM

## 2023-10-24 RX ORDER — MEPERIDINE HYDROCHLORIDE 25 MG/ML
12.5 INJECTION INTRAMUSCULAR; INTRAVENOUS; SUBCUTANEOUS
Status: DISCONTINUED | OUTPATIENT
Start: 2023-10-24 | End: 2023-10-24 | Stop reason: HOSPADM

## 2023-10-24 RX ORDER — BISACODYL 5 MG/1
10 TABLET, DELAYED RELEASE ORAL DAILY PRN
Status: DISCONTINUED | OUTPATIENT
Start: 2023-10-24 | End: 2023-10-25 | Stop reason: HOSPADM

## 2023-10-24 RX ORDER — ONDANSETRON 4 MG/1
4 TABLET, FILM COATED ORAL EVERY 6 HOURS PRN
Status: DISCONTINUED | OUTPATIENT
Start: 2023-10-24 | End: 2023-10-25 | Stop reason: HOSPADM

## 2023-10-24 RX ORDER — CEFAZOLIN SODIUM IN 0.9 % NACL 3 G/100 ML
3 INTRAVENOUS SOLUTION, PIGGYBACK (ML) INTRAVENOUS ONCE
Status: DISCONTINUED | OUTPATIENT
Start: 2023-10-24 | End: 2023-10-24 | Stop reason: ALTCHOICE

## 2023-10-24 RX ORDER — PROMETHAZINE HYDROCHLORIDE 12.5 MG/1
25 TABLET ORAL ONCE AS NEEDED
Status: DISCONTINUED | OUTPATIENT
Start: 2023-10-24 | End: 2023-10-24 | Stop reason: HOSPADM

## 2023-10-24 RX ORDER — SODIUM CHLORIDE, SODIUM LACTATE, POTASSIUM CHLORIDE, CALCIUM CHLORIDE 600; 310; 30; 20 MG/100ML; MG/100ML; MG/100ML; MG/100ML
9 INJECTION, SOLUTION INTRAVENOUS CONTINUOUS PRN
Status: DISCONTINUED | OUTPATIENT
Start: 2023-10-24 | End: 2023-10-24 | Stop reason: HOSPADM

## 2023-10-24 RX ORDER — ONDANSETRON 2 MG/ML
INJECTION INTRAMUSCULAR; INTRAVENOUS AS NEEDED
Status: DISCONTINUED | OUTPATIENT
Start: 2023-10-24 | End: 2023-10-24 | Stop reason: SURG

## 2023-10-24 RX ORDER — SODIUM CHLORIDE 9 MG/ML
40 INJECTION, SOLUTION INTRAVENOUS AS NEEDED
Status: DISCONTINUED | OUTPATIENT
Start: 2023-10-24 | End: 2023-10-25 | Stop reason: HOSPADM

## 2023-10-24 RX ORDER — MAGNESIUM HYDROXIDE 1200 MG/15ML
LIQUID ORAL AS NEEDED
Status: DISCONTINUED | OUTPATIENT
Start: 2023-10-24 | End: 2023-10-24 | Stop reason: HOSPADM

## 2023-10-24 RX ORDER — DEXAMETHASONE SODIUM PHOSPHATE 4 MG/ML
INJECTION, SOLUTION INTRA-ARTICULAR; INTRALESIONAL; INTRAMUSCULAR; INTRAVENOUS; SOFT TISSUE AS NEEDED
Status: DISCONTINUED | OUTPATIENT
Start: 2023-10-24 | End: 2023-10-24 | Stop reason: SURG

## 2023-10-24 RX ORDER — CELECOXIB 100 MG/1
200 CAPSULE ORAL ONCE
Status: COMPLETED | OUTPATIENT
Start: 2023-10-24 | End: 2023-10-24

## 2023-10-24 RX ORDER — SODIUM CHLORIDE 0.9 % (FLUSH) 0.9 %
10 SYRINGE (ML) INJECTION AS NEEDED
Status: DISCONTINUED | OUTPATIENT
Start: 2023-10-24 | End: 2023-10-25 | Stop reason: HOSPADM

## 2023-10-24 RX ORDER — HYDROCODONE BITARTRATE AND ACETAMINOPHEN 7.5; 325 MG/1; MG/1
1 TABLET ORAL EVERY 4 HOURS PRN
Status: DISCONTINUED | OUTPATIENT
Start: 2023-10-24 | End: 2023-10-25 | Stop reason: HOSPADM

## 2023-10-24 RX ORDER — SODIUM CHLORIDE 0.9 % (FLUSH) 0.9 %
10 SYRINGE (ML) INJECTION EVERY 12 HOURS SCHEDULED
Status: DISCONTINUED | OUTPATIENT
Start: 2023-10-24 | End: 2023-10-25 | Stop reason: HOSPADM

## 2023-10-24 RX ORDER — PROMETHAZINE HYDROCHLORIDE 25 MG/1
25 SUPPOSITORY RECTAL ONCE AS NEEDED
Status: DISCONTINUED | OUTPATIENT
Start: 2023-10-24 | End: 2023-10-24 | Stop reason: HOSPADM

## 2023-10-24 RX ORDER — LIDOCAINE HYDROCHLORIDE 20 MG/ML
INJECTION, SOLUTION EPIDURAL; INFILTRATION; INTRACAUDAL; PERINEURAL AS NEEDED
Status: DISCONTINUED | OUTPATIENT
Start: 2023-10-24 | End: 2023-10-24 | Stop reason: SURG

## 2023-10-24 RX ORDER — SODIUM CHLORIDE 0.9 % (FLUSH) 0.9 %
10 SYRINGE (ML) INJECTION AS NEEDED
Status: DISCONTINUED | OUTPATIENT
Start: 2023-10-24 | End: 2023-10-24 | Stop reason: HOSPADM

## 2023-10-24 RX ORDER — METOPROLOL SUCCINATE 25 MG/1
12.5 TABLET, EXTENDED RELEASE ORAL DAILY
Status: DISCONTINUED | OUTPATIENT
Start: 2023-10-25 | End: 2023-10-25 | Stop reason: HOSPADM

## 2023-10-24 RX ORDER — SODIUM CHLORIDE 9 MG/ML
40 INJECTION, SOLUTION INTRAVENOUS AS NEEDED
Status: DISCONTINUED | OUTPATIENT
Start: 2023-10-24 | End: 2023-10-24 | Stop reason: HOSPADM

## 2023-10-24 RX ORDER — ROCURONIUM BROMIDE 10 MG/ML
INJECTION, SOLUTION INTRAVENOUS AS NEEDED
Status: DISCONTINUED | OUTPATIENT
Start: 2023-10-24 | End: 2023-10-24 | Stop reason: SURG

## 2023-10-24 RX ORDER — PROPOFOL 10 MG/ML
VIAL (ML) INTRAVENOUS AS NEEDED
Status: DISCONTINUED | OUTPATIENT
Start: 2023-10-24 | End: 2023-10-24 | Stop reason: SURG

## 2023-10-24 RX ORDER — TRANEXAMIC ACID 10 MG/ML
1000 INJECTION, SOLUTION INTRAVENOUS ONCE
Status: COMPLETED | OUTPATIENT
Start: 2023-10-24 | End: 2023-10-24

## 2023-10-24 RX ORDER — AMOXICILLIN 250 MG
2 CAPSULE ORAL 2 TIMES DAILY PRN
Status: DISCONTINUED | OUTPATIENT
Start: 2023-10-24 | End: 2023-10-25 | Stop reason: HOSPADM

## 2023-10-24 RX ORDER — KETOROLAC TROMETHAMINE 15 MG/ML
15 INJECTION, SOLUTION INTRAMUSCULAR; INTRAVENOUS EVERY 6 HOURS
Status: DISCONTINUED | OUTPATIENT
Start: 2023-10-24 | End: 2023-10-25

## 2023-10-24 RX ADMIN — ONDANSETRON 4 MG: 2 INJECTION INTRAMUSCULAR; INTRAVENOUS at 15:03

## 2023-10-24 RX ADMIN — LIDOCAINE HYDROCHLORIDE 80 MG: 20 INJECTION, SOLUTION EPIDURAL; INFILTRATION; INTRACAUDAL; PERINEURAL at 14:02

## 2023-10-24 RX ADMIN — ROCURONIUM BROMIDE 50 MG: 50 INJECTION INTRAVENOUS at 14:02

## 2023-10-24 RX ADMIN — PROPOFOL 200 MG: 10 INJECTION, EMULSION INTRAVENOUS at 14:02

## 2023-10-24 RX ADMIN — TRANEXAMIC ACID 1000 MG: 10 INJECTION, SOLUTION INTRAVENOUS at 12:58

## 2023-10-24 RX ADMIN — HYDROMORPHONE HYDROCHLORIDE 0.5 MG: 1 INJECTION, SOLUTION INTRAMUSCULAR; INTRAVENOUS; SUBCUTANEOUS at 15:03

## 2023-10-24 RX ADMIN — ACETAMINOPHEN 1000 MG: 500 TABLET ORAL at 11:25

## 2023-10-24 RX ADMIN — CEFAZOLIN SODIUM 2 G: 2 INJECTION, SOLUTION INTRAVENOUS at 22:58

## 2023-10-24 RX ADMIN — SODIUM CHLORIDE, POTASSIUM CHLORIDE, SODIUM LACTATE AND CALCIUM CHLORIDE 9 ML/HR: 600; 310; 30; 20 INJECTION, SOLUTION INTRAVENOUS at 11:13

## 2023-10-24 RX ADMIN — KETOROLAC TROMETHAMINE 15 MG: 15 INJECTION, SOLUTION INTRAMUSCULAR; INTRAVENOUS at 22:57

## 2023-10-24 RX ADMIN — EPHEDRINE SULFATE 10 MG: 50 INJECTION INTRAVENOUS at 15:14

## 2023-10-24 RX ADMIN — MIDAZOLAM HYDROCHLORIDE 2 MG: 1 INJECTION, SOLUTION INTRAMUSCULAR; INTRAVENOUS at 12:57

## 2023-10-24 RX ADMIN — BUPIVACAINE HYDROCHLORIDE AND EPINEPHRINE BITARTRATE 30 ML: 5; .005 INJECTION, SOLUTION EPIDURAL; INTRACAUDAL; PERINEURAL at 13:28

## 2023-10-24 RX ADMIN — ACETAMINOPHEN 1000 MG: 500 TABLET ORAL at 22:57

## 2023-10-24 RX ADMIN — EPHEDRINE SULFATE 5 MG: 50 INJECTION INTRAVENOUS at 14:25

## 2023-10-24 RX ADMIN — FENTANYL CITRATE 100 MCG: 50 INJECTION, SOLUTION INTRAMUSCULAR; INTRAVENOUS at 14:00

## 2023-10-24 RX ADMIN — CELECOXIB 200 MG: 100 CAPSULE ORAL at 11:25

## 2023-10-24 RX ADMIN — CEFAZOLIN SODIUM 2 G: 2 INJECTION, SOLUTION INTRAVENOUS at 14:05

## 2023-10-24 RX ADMIN — SUGAMMADEX 200 MG: 100 INJECTION, SOLUTION INTRAVENOUS at 15:36

## 2023-10-24 RX ADMIN — KETOROLAC TROMETHAMINE 15 MG: 15 INJECTION, SOLUTION INTRAMUSCULAR; INTRAVENOUS at 17:30

## 2023-10-24 RX ADMIN — DEXAMETHASONE SODIUM PHOSPHATE 8 MG: 4 INJECTION, SOLUTION INTRAMUSCULAR; INTRAVENOUS at 14:02

## 2023-10-24 RX ADMIN — TRANEXAMIC ACID 1000 MG: 10 INJECTION, SOLUTION INTRAVENOUS at 15:08

## 2023-10-24 NOTE — ANESTHESIA PREPROCEDURE EVALUATION
Anesthesia Evaluation     Patient summary reviewed and Nursing notes reviewed   no history of anesthetic complications:   NPO Solid Status: > 8 hours  NPO Liquid Status: > 2 hours           Airway   Mallampati: II  TM distance: >3 FB  Neck ROM: full  No difficulty expected  Dental      Pulmonary - normal exam    breath sounds clear to auscultation  (+) ,shortness of breath  Cardiovascular - normal exam  Exercise tolerance: good (4-7 METS)    Rhythm: regular  Rate: normal    (+) hypertension, hyperlipidemia      Neuro/Psych- negative ROS  GI/Hepatic/Renal/Endo    (+) GERD well controlled, diabetes mellitus type 2    Musculoskeletal (-) negative ROS    Abdominal    Substance History - negative use     OB/GYN negative ob/gyn ROS         Other - negative ROS       ROS/Med Hx Other: >4METS, ACTIVE. HX ANEMIA,HTN,DM. STRESS 10/13/21 EF 51%,NO ISCHEMIA, ECHO 10/13/21 NO VALVE STENOSIS. PCP OV 9/19/23 NEUROCARDIOGENIC SYNCOPE, MED MGMT. TJR. KT               Anesthesia Plan    ASA 3     ERAS Protocol and general with block     (Patient understands anesthesia not responsible for dental damage.)  intravenous induction     Anesthetic plan, risks, benefits, and alternatives have been provided, discussed and informed consent has been obtained with: patient.  Pre-procedure education provided  Plan discussed with CRNA.    CODE STATUS:

## 2023-10-24 NOTE — H&P
"H and p      Chief Complaint  Initial Evaluation of the Left Knee        Subjective   Marcio Enrique presents to [facility] for initial evaluation of the left knee. He had a knee surgery of the left knee 45 years ago of a lateral meniscectomy.   He has had pain off and on for years.  The pain is around the entire knee.  He has had injections, anti inflammatory and exercises.  He has difficulty with prolonged ambulation, stairs and daily tasks.       No Known Allergies      Social History   Social History           Socioeconomic History    Marital status:    Tobacco Use    Smoking status: Never    Smokeless tobacco: Never   Vaping Use    Vaping Use: Never used   Substance and Sexual Activity    Alcohol use: No    Drug use: No    Sexual activity: Defer            I reviewed the patient's chief complaint, history of present illness, review of systems, past medical history, surgical history, family history, social history, medications, and allergy list.      Review of Systems      Constitutional: Denies fevers, chills, weight loss  Cardiovascular: Denies chest pain, shortness of breath  Skin: Denies rashes, acute skin changes  Neurologic: Denies headache, loss of consciousness           Vital Signs:   /82   Pulse 64   Ht 195.6 cm (77\")   Wt 121 kg (267 lb)   SpO2 98%   BMI 31.66 kg/m²           Physical Exam  General: Alert. No acute distress     Ortho Exam          LEFT KNEE Flexion 110. Extension -10. Stable to varus/valgus stress. Stable to anterior/posterior drawer. Neurovascularly intact. Negative Lila. Negative Lachman. Positive EHL, FHL, HS and TA. Sensation intact to light touch all 5 nerves of the foot. Ambulates with Antalgic gait. Patella is well tracking. Calf supple, non-tender. Positive tenderness to the medial joint line. Positive tenderness to the lateral joint line. Positive Crepitus. Good strength to hamstrings, quadriceps, dorsiflexors, and plantar " flexors.  Knee Extensor Mechanism intact.  Severe Varus deformity.            Procedures           Imaging Results (Most Recent)         Procedure Component Value Units Date/Time     XR Knee 3 View Left [[id number]] Resulted: 10/02/23 0857       Updated: 10/02/23 0906                      Result Review   :      X-Ray Report:  Left knee X-Ray  Indication: Evaluation of the left knee  AP/Lateral and [place] view(s)  Findings: Advanced degenerative arthritis with end stage bone on bone.    Prior studies available for comparison: no                  Assessment   Assessment and Plan      Diagnoses and all orders for this visit:     1. Left knee pain, unspecified chronicity (Primary)  -     XR Knee 3 View Left     2. Osteoarthritis of left knee, unspecified osteoarthritis type           Discussed the treatment plan with the patient. I reviewed the X-rays that were obtained today with the patient.      Discussed the treatment options with the patient, operative vs non-operative. The patient is a candidate for a left total knee arthroplasty.      The patient expressed understanding and wished to proceed with a left total knee arthroplasty      Discussed surgery., Risks/benefits discussed with patient including, but not limited to: infection, bleeding, neurovascular damage, re-rupture, aesthetic deformity, need for further surgery, and death., Discussed with patient the implant type being used during surgery and patient understands., Surgery pamphlet given., Call or return if worsening symptoms., and DME order for a 3 in 1 given today due to patient will be confined to one room/level of the home that does not offer a toilet during postop recovery.      Follow Up      2 weeks postoperatively    Gurinder Figueroa MD  10/24/23

## 2023-10-24 NOTE — ANESTHESIA POSTPROCEDURE EVALUATION
Patient: Marcio Enrique    Procedure Summary       Date: 10/24/23 Room / Location: [facility] OR 06 / [facility] MAIN OR    Anesthesia Start: 1357 Anesthesia Stop:     Procedure: LEFT TOTAL KNEE ARTHROPLASTY WITH DAVID ROBOT (Left: Knee) Diagnosis:       Osteoarthritis of left knee, unspecified osteoarthritis type      (Osteoarthritis of left knee, unspecified osteoarthritis type [M17.12])    Surgeons: Gurinder Figueroa MD Provider: Trey Gonsalves MD    Anesthesia Type: ERAS Protocol, general with block ASA Status: 3            Anesthesia Type: ERAS Protocol, general with block    Vitals  Vitals Value Taken Time   BP     Temp     Pulse 79 10/24/23 1545   Resp     SpO2     Vitals shown include unfiled device data.        Post Anesthesia Care and Evaluation    Patient location during evaluation: bedside  Patient participation: complete - patient participated  Level of consciousness: awake and alert  Pain management: adequate    Airway patency: patent  Anesthetic complications: No anesthetic complications  PONV Status: none  Cardiovascular status: acceptable  Respiratory status: acceptable  Hydration status: acceptable    Comments: An Anesthesiologist personally participated in the most demanding procedures (including induction and emergence if applicable) in the anesthesia plan, monitored the course of anesthesia administration at frequent intervals and remained physically present and available for immediate diagnosis and treatment of emergencies.

## 2023-10-24 NOTE — ANESTHESIA PROCEDURE NOTES
Peripheral Block      Patient reassessed immediately prior to procedure    Patient location during procedure: pre-op  Reason for block: at surgeon's request and post-op pain management  Preanesthetic Checklist  Completed: patient identified, IV checked, site marked, risks and benefits discussed, surgical consent, monitors and equipment checked, pre-op evaluation and timeout performed  Prep:  Pt Position: supine  Sterile barriers:alcohol skin prep, partial drape, cap, washed/disinfected hands, mask and gloves  Prep: ChloraPrep  Patient monitoring: blood pressure monitoring, continuous pulse oximetry and EKG  Procedure    Sedation: yes  Performed under: local infiltration  Guidance:ultrasound guided and nerve stimulator    ULTRASOUND INTERPRETATION.  Using ultrasound guidance a 20 G gauge needle was placed in close proximity to the nerve, at which point, under ultrasound guidance anesthetic was injected in the area of the nerve and spread of the anesthesia was seen on ultrasound in close proximity thereto.  There were no abnormalities seen on ultrasound; a digital image was taken; and the patient tolerated the procedure with no complications. Images:still images obtained, printed/placed on chart    Laterality:left  Block Type:adductor canal block  Injection Technique:single-shot  Needle Type:echogenic  Needle Gauge:20 G (4in)  Resistance on Injection: none    Medications Used: bupivacaine-EPINEPHrine PF (MARCAINE w/EPI) 0.5% -1:[id number] injection - Injection   30 mL - 10/24/2023 1:28:00 PM      Post Assessment  Injection Assessment: negative aspiration for heme, no paresthesia on injection and incremental injection  Patient Tolerance:comfortable throughout block  Complications:no  Additional Notes  The block or continuous infusion is requested by the referring physician for management of postoperative pain, or pain related to a procedure. Ultrasound guidance (deemed medically necessary). Painless injection, pt was awake  and conversant during the procedure without complications. Needle and surrounding structures visualized throughout procedure. No adverse reactions or complications seen during this period. Post-procedure image showed no signs of complication, and anatomy was consistent with an uncomplicated nerve blockade.

## 2023-10-25 VITALS
SYSTOLIC BLOOD PRESSURE: 125 MMHG | RESPIRATION RATE: 17 BRPM | TEMPERATURE: 98.3 F | DIASTOLIC BLOOD PRESSURE: 68 MMHG | OXYGEN SATURATION: 100 % | HEART RATE: 57 BPM | BODY MASS INDEX: 30.35 KG/M2 | HEIGHT: 77 IN | WEIGHT: 257.06 LBS

## 2023-10-25 LAB
ALBUMIN SERPL-MCNC: 3.7 G/DL (ref 3.5–5.2)
ANION GAP SERPL CALCULATED.3IONS-SCNC: 7.2 MMOL/L (ref 5–15)
BASOPHILS # BLD AUTO: 0 10*3/MM3 (ref 0–0.2)
BASOPHILS NFR BLD AUTO: 0 % (ref 0–1.5)
BUN SERPL-MCNC: 22 MG/DL (ref 8–23)
BUN/CREAT SERPL: 16.5 (ref 7–25)
CALCIUM SPEC-SCNC: 9 MG/DL (ref 8.6–10.5)
CHLORIDE SERPL-SCNC: 105 MMOL/L (ref 98–107)
CO2 SERPL-SCNC: 25.8 MMOL/L (ref 22–29)
CREAT SERPL-MCNC: 1.33 MG/DL (ref 0.76–1.27)
DEPRECATED RDW RBC AUTO: 39.7 FL (ref 37–54)
EGFRCR SERPLBLD CKD-EPI 2021: 55.7 ML/MIN/1.73
EOSINOPHIL # BLD AUTO: 0 10*3/MM3 (ref 0–0.4)
EOSINOPHIL NFR BLD AUTO: 0 % (ref 0.3–6.2)
ERYTHROCYTE [DISTWIDTH] IN BLOOD BY AUTOMATED COUNT: 12.2 % (ref 12.3–15.4)
GLUCOSE SERPL-MCNC: 161 MG/DL (ref 65–99)
HCT VFR BLD AUTO: 33.4 % (ref 37.5–51)
HGB BLD-MCNC: 11.2 G/DL (ref 13–17.7)
IMM GRANULOCYTES # BLD AUTO: 0.05 10*3/MM3 (ref 0–0.05)
IMM GRANULOCYTES NFR BLD AUTO: 0.4 % (ref 0–0.5)
LYMPHOCYTES # BLD AUTO: 0.87 10*3/MM3 (ref 0.7–3.1)
LYMPHOCYTES NFR BLD AUTO: 6.5 % (ref 19.6–45.3)
MCH RBC QN AUTO: 29.6 PG (ref 26.6–33)
MCHC RBC AUTO-ENTMCNC: 33.5 G/DL (ref 31.5–35.7)
MCV RBC AUTO: 88.1 FL (ref 79–97)
MONOCYTES # BLD AUTO: 1.11 10*3/MM3 (ref 0.1–0.9)
MONOCYTES NFR BLD AUTO: 8.3 % (ref 5–12)
NEUTROPHILS NFR BLD AUTO: 11.38 10*3/MM3 (ref 1.7–7)
NEUTROPHILS NFR BLD AUTO: 84.8 % (ref 42.7–76)
NRBC BLD AUTO-RTO: 0 /100 WBC (ref 0–0.2)
PHOSPHATE SERPL-MCNC: 2.5 MG/DL (ref 2.5–4.5)
PLATELET # BLD AUTO: 243 10*3/MM3 (ref 140–450)
PMV BLD AUTO: 9.6 FL (ref 6–12)
POTASSIUM SERPL-SCNC: 5.1 MMOL/L (ref 3.5–5.2)
RBC # BLD AUTO: 3.79 10*6/MM3 (ref 4.14–5.8)
SODIUM SERPL-SCNC: 138 MMOL/L (ref 136–145)
WBC NRBC COR # BLD: 13.41 10*3/MM3 (ref 3.4–10.8)

## 2023-10-25 PROCEDURE — 63710000001 APIXABAN 2.5 MG TABLET: Performed by: ORTHOPAEDIC SURGERY

## 2023-10-25 PROCEDURE — 97150 GROUP THERAPEUTIC PROCEDURES: CPT

## 2023-10-25 PROCEDURE — 97161 PT EVAL LOW COMPLEX 20 MIN: CPT

## 2023-10-25 PROCEDURE — 97165 OT EVAL LOW COMPLEX 30 MIN: CPT

## 2023-10-25 PROCEDURE — 94761 N-INVAS EAR/PLS OXIMETRY MLT: CPT

## 2023-10-25 PROCEDURE — 25010000002 KETOROLAC TROMETHAMINE PER 15 MG: Performed by: ORTHOPAEDIC SURGERY

## 2023-10-25 PROCEDURE — 63710000001 METOPROLOL SUCCINATE XL 25 MG TABLET SUSTAINED-RELEASE 24 HOUR: Performed by: INTERNAL MEDICINE

## 2023-10-25 PROCEDURE — A9270 NON-COVERED ITEM OR SERVICE: HCPCS | Performed by: INTERNAL MEDICINE

## 2023-10-25 PROCEDURE — 97530 THERAPEUTIC ACTIVITIES: CPT

## 2023-10-25 PROCEDURE — A9270 NON-COVERED ITEM OR SERVICE: HCPCS | Performed by: ORTHOPAEDIC SURGERY

## 2023-10-25 PROCEDURE — 80069 RENAL FUNCTION PANEL: CPT | Performed by: INTERNAL MEDICINE

## 2023-10-25 PROCEDURE — 97116 GAIT TRAINING THERAPY: CPT

## 2023-10-25 PROCEDURE — 97535 SELF CARE MNGMENT TRAINING: CPT

## 2023-10-25 PROCEDURE — 63710000001 HYDROCODONE-ACETAMINOPHEN 7.5-325 MG TABLET: Performed by: ORTHOPAEDIC SURGERY

## 2023-10-25 PROCEDURE — 85025 COMPLETE CBC W/AUTO DIFF WBC: CPT | Performed by: INTERNAL MEDICINE

## 2023-10-25 PROCEDURE — 63710000001 SERTRALINE 50 MG TABLET: Performed by: INTERNAL MEDICINE

## 2023-10-25 PROCEDURE — 25010000002 CEFAZOLIN IN DEXTROSE 2-4 GM/100ML-% SOLUTION: Performed by: ORTHOPAEDIC SURGERY

## 2023-10-25 PROCEDURE — 94799 UNLISTED PULMONARY SVC/PX: CPT

## 2023-10-25 RX ORDER — ASPIRIN 325 MG
325 TABLET ORAL DAILY
Qty: 21 TABLET | Refills: 1 | Status: SHIPPED | OUTPATIENT
Start: 2023-10-25

## 2023-10-25 RX ORDER — OXYCODONE AND ACETAMINOPHEN 7.5; 325 MG/1; MG/1
1 TABLET ORAL EVERY 4 HOURS PRN
Qty: 45 TABLET | Refills: 0 | Status: SHIPPED | OUTPATIENT
Start: 2023-10-25

## 2023-10-25 RX ADMIN — SERTRALINE HYDROCHLORIDE 50 MG: 50 TABLET ORAL at 08:51

## 2023-10-25 RX ADMIN — HYDROCODONE BITARTRATE AND ACETAMINOPHEN 1 TABLET: 7.5; 325 TABLET ORAL at 12:11

## 2023-10-25 RX ADMIN — KETOROLAC TROMETHAMINE 15 MG: 15 INJECTION, SOLUTION INTRAMUSCULAR; INTRAVENOUS at 05:07

## 2023-10-25 RX ADMIN — Medication 10 ML: at 08:51

## 2023-10-25 RX ADMIN — CEFAZOLIN SODIUM 2 G: 2 INJECTION, SOLUTION INTRAVENOUS at 05:07

## 2023-10-25 RX ADMIN — METOPROLOL SUCCINATE 12.5 MG: 25 TABLET, EXTENDED RELEASE ORAL at 08:51

## 2023-10-25 RX ADMIN — APIXABAN 2.5 MG: 2.5 TABLET, FILM COATED ORAL at 08:51

## 2023-10-25 NOTE — THERAPY EVALUATION
Patient Name: Marcio Enrique  : 1950    MRN: [id number]                              Today's Date: 10/25/2023       Admit Date: 10/24/2023    Visit Dx:     ICD-10-CM ICD-9-CM   1. Primary osteoarthritis of left knee  M17.12 715.16   2. Osteoarthritis of left knee, unspecified osteoarthritis type  M17.12 715.96   3. Difficulty in walking  R26.2 719.7   4. Decreased activities of daily living (ADL)  Z78.9 V49.89     Patient Active Problem List   Diagnosis    Essential hypertension    Gastro-esophageal reflux disease without esophagitis    Mixed hyperlipidemia    Type 2 diabetes mellitus without complication, without long-term current use of insulin    Primary osteoarthritis involving multiple joints    Iron deficiency anemia secondary to inadequate dietary iron intake    Dyspnea on exertion    Vertigo    Seborrheic dermatitis    Medicare annual wellness visit, subsequent    Primary insomnia    Left shoulder pain    Chronic pain of left knee    Primary osteoarthritis of left knee    Osteoarthritis of left knee     Past Medical History:   Diagnosis Date    Acid reflux     Anesthesia     FAMILY AND Pt REPORT Pt WAS COMBATIVE AFTER DENTAL PROCEDURE    Dizziness     DJD (degenerative joint disease)     LEFT SHOULDER; LEFT KNEE= MRI    Essential (primary) hypertension     Gastro-esophageal reflux disease without esophagitis     High cholesterol     HTN (hypertension)     Iron deficiency anemia, unspecified     Left knee pain     Mixed hyperlipidemia     Neuropathy     Other fatigue     Partial tear of left rotator cuff     PUD (peptic ulcer disease)     Pure hypercholesterolemia     INCREASED CK WITH ALL    SOB (shortness of breath)     HAD ECHO/ STRESS NO PROBLEMS SINCE THEN. NO CARDIOLOGIST- DENIED CP/SOB    Type 2 diabetes mellitus without complication     ON MEDS- DOESN'T CHECK BG DAILY AT HOME    Unspecified osteoarthritis, unspecified site      Past Surgical History:   Procedure Laterality Date     CIRCUMCISION      KNEE ACL RECONSTRUCTION      left    KNEE SURGERY Left     CLEAN     TOOTH EXTRACTION      TOTAL KNEE ARTHROPLASTY Left 10/24/2023    Procedure: LEFT TOTAL KNEE ARTHROPLASTY WITH MURALI ROBOT;  Surgeon: Gurinder Figueroa MD;  Location: [facility] MAIN OR;  Service: Robotics - Ortho;  Laterality: Left;      General Information       Row Name 10/25/23 1135 10/25/23 1125       OT Time and Intention    Document Type therapy note (daily note)  - evaluation  -    Mode of Treatment individual therapy;occupational therapy  - individual therapy;occupational therapy  -      Row Name 10/25/23 1125          General Information    Patient Profile Reviewed yes  -     Prior Level of Function --  (I) with ADLs, ambulated w/o a device, has a step over tub where he stands to shower, standard commode, stands to groom, drives, and no home O2.  -     Existing Precautions/Restrictions fall;weight bearing  WBAT LLE  -     Barriers to Rehab none identified  -       Row Name 10/25/23 1125          Occupational Profile    Reason for Services/Referral (Occupational Profile) Patient is a 73 year old male who is currently status post left total knee replacement using murali robot on October 24th, 2023. Occupational therapy consulted due to recent decline in ADLs/functional transfers. No previous occupational therapy services for current condition.  -       Row Name 10/25/23 1125          Living Environment    People in Home alone  -       Row Name 10/25/23 1125          Home Main Entrance    Number of Stairs, Main Entrance four  -     Stair Railings, Main Entrance none  -       Row Name 10/25/23 1125          Cognition    Orientation Status (Cognition) oriented x 4  -       Row Name 10/25/23 1125          Safety Issues, Functional Mobility    Impairments Affecting Function (Mobility) balance;pain  -               User Key  (r) = Recorded By, (t) = Taken By, (c) = Cosigned By      Initials Name Provider Type     LF Gayle Calvo OT Occupational Therapist                     Mobility/ADL's       Row Name 10/25/23 1135 10/25/23 1130       Bed Mobility    Comment, (Bed Mobility) Patient upright and seated in recliner upon therapist arrival.  -LF Patient upright and seated in recliner upon therapist arrival.  -LF      Row Name 10/25/23 1135 10/25/23 1130       Transfers    Transfers sit-stand transfer;stand-sit transfer  -LF sit-stand transfer;stand-sit transfer  -LF      Row Name 10/25/23 1135 10/25/23 1130       Sit-Stand Transfer    Sit-Stand [place] (Transfers) contact guard  -LF contact guard  -LF    Assistive Device (Sit-Stand Transfers) walker, front-wheeled  -LF walker, front-wheeled  -LF      Row Name 10/25/23 1135 10/25/23 1130       Stand-Sit Transfer    Stand-Sit [place] (Transfers) contact guard  -LF contact guard  -LF    Assistive Device (Stand-Sit Transfers) walker, front-wheeled  -LF walker, front-wheeled  -LF      Row Name 10/25/23 1135 10/25/23 1130       Activities of Daily Living    BADL Assessment/Intervention upper body dressing;lower body dressing  -LF bathing;upper body dressing;lower body dressing;grooming;feeding;toileting  -LF      Row Name 10/25/23 1135 10/25/23 1130       Lower Body Dressing Assessment/Training    [place] Level (Lower Body Dressing) lower body dressing skills;don;pants/bottoms;socks;shoes/slippers;contact guard assist  -LF lower body dressing skills;contact guard assist  -LF    Position (Lower Body Dressing) unsupported sitting;supported standing  -LF --    Comment, (Lower Body Dressing) Educated patient on lower body adaptive dressing technique, verified learning via teachback.  -LF --      Row Name 10/25/23 1135 10/25/23 1130       Upper Body Dressing Assessment/Training    [place] Level (Upper Body Dressing) upper body dressing skills;don;pull-over garment;set up  -LF upper body dressing skills;set up  -LF    Position (Upper Body Dressing) unsupported  sitting  -LF --      Row Name 10/25/23 1130          Bathing Assessment/Intervention    [place] Level (Bathing) bathing skills;upper body;set up;lower body;contact guard assist  -LF       Row Name 10/25/23 1130          Self-Feeding Assessment/Training    [place] Level (Feeding) feeding skills;set up  -LF       Row Name 10/25/23 1130          Grooming Assessment/Training    [place] Level (Grooming) grooming skills;set up  -       Row Name 10/25/23 1130          Toileting Assessment/Training    [place] Level (Toileting) toileting skills;contact guard assist  -LF               User Key  (r) = Recorded By, (t) = Taken By, (c) = Cosigned By      Initials Name Provider Type     Gayle Calvo OT Occupational Therapist                   Obj/Interventions       Row Name 10/25/23 1130          Sensory Assessment (Somatosensory)    Sensory Assessment (Somatosensory) UE sensation intact  -       Row Name 10/25/23 1130          Vision Assessment/Intervention    Visual Impairment/Limitations WFL;corrective lenses full-time  -       Row Name 10/25/23 1130          Range of Motion Comprehensive    General Range of Motion bilateral upper extremity ROM WFL  -       Row Name 10/25/23 1130          Strength Comprehensive (MMT)    Comment, General Manual Muscle Testing (MMT) Assessment 5/5 BUEs  -       Row Name 10/25/23 1130          Motor Skills    Motor Skills coordination;functional endurance  -LF     Coordination bilateral;upper extremity;WFL  -LF     Functional Endurance Good for BADLs  -       Row Name 10/25/23 1136 10/25/23 1130       Balance    Balance Assessment sitting dynamic balance;standing dynamic balance  -LF sitting dynamic balance;standing dynamic balance  -LF    Dynamic Sitting Balance independent  -LF independent  -LF    Position, Sitting Balance unsupported;sitting in chair  -LF unsupported;sitting in chair  -LF    Dynamic Standing Balance contact guard  -LF contact guard  -LF     Position/Device Used, Standing Balance supported;walker, front-wheeled  -LF supported;walker, front-wheeled  -LF    Balance Interventions sitting;standing;sit to stand;supported;dynamic;occupation based/functional task;weight shifting activity  -LF --              User Key  (r) = Recorded By, (t) = Taken By, (c) = Cosigned By      Initials Name Provider Type     Gayle Calvo, OT Occupational Therapist                   Goals/Plan       Row Name 10/25/23 1134          Bed Mobility Goal 1 (OT)    Activity/Assistive Device (Bed Mobility Goal 1, OT) bed mobility activities, all  -LF     [place] Level/Cues Needed (Bed Mobility Goal 1, OT) modified independence  -LF     Time Frame (Bed Mobility Goal 1, OT) long term goal (LTG);10 days  -LF       Row Name 10/25/23 1134          Transfer Goal 1 (OT)    Activity/Assistive Device (Transfer Goal 1, OT) transfers, all  -LF     [place] Level/Cues Needed (Transfer Goal 1, OT) modified independence  -LF     Time Frame (Transfer Goal 1, OT) long term goal (LTG);10 days  -LF       Row Name 10/25/23 1134          Bathing Goal 1 (OT)    Activity/Device (Bathing Goal 1, OT) bathing skills, all  -LF     [place] Level/Cues Needed (Bathing Goal 1, OT) modified independence  -LF     Time Frame (Bathing Goal 1, OT) long term goal (LTG);10 days  -LF       Row Name 10/25/23 1134          Dressing Goal 1 (OT)    Activity/Device (Dressing Goal 1, OT) dressing skills, all  -LF     [place]/Cues Needed (Dressing Goal 1, OT) modified independence  -LF     Time Frame (Dressing Goal 1, OT) long term goal (LTG);10 days  -LF       Row Name 10/25/23 1134          Toileting Goal 1 (OT)    Activity/Device (Toileting Goal 1, OT) toileting skills, all  -LF     [place] Level/Cues Needed (Toileting Goal 1, OT) modified independence  -LF     Time Frame (Toileting Goal 1, OT) long term goal (LTG);10 days  -LF       Row Name 10/25/23 1134          Therapy Assessment/Plan (OT)     Planned Therapy Interventions (OT) activity tolerance training;BADL retraining;functional balance retraining;occupation/activity based interventions;patient/caregiver education/training;strengthening exercise;transfer/mobility retraining  -               User Key  (r) = Recorded By, (t) = Taken By, (c) = Cosigned By      Initials Name Provider Type     Gayle aClvo OT Occupational Therapist                   Clinical Impression       Row Name 10/25/23 1131          Pain Assessment    Additional Documentation Pain Scale: FACES Pre/Post-Treatment (Group)  -       Row Name 10/25/23 1131          Pain Scale: FACES Pre/Post-Treatment    Pain: FACES Scale, Pretreatment 2-->hurts little bit  -     Posttreatment Pain Rating 4-->hurts little more  -LF     Pain Location - Side/Orientation Left  -     Pain Location - knee  -       Row Name 10/25/23 1131          Plan of Care Review    Plan of Care Reviewed With patient  -     Progress no change  -     Outcome Evaluation Patient presents with limitations in self-care, functional transfers, and balance. He would benefit from continued skilled occupational therapy services to maximize independence with ADLs/functional transfers.  -       Row Name 10/25/23 1131          Therapy Assessment/Plan (OT)    Patient/Family Therapy Goal Statement (OT) To maximize independence.  -     Rehab Potential (OT) good, to achieve stated therapy goals  -     Criteria for Skilled Therapeutic Interventions Met (OT) yes;meets criteria;skilled treatment is necessary  -     Therapy Frequency (OT) 5 times/wk  -       Row Name 10/25/23 1131          Therapy Plan Review/Discharge Plan (OT)    Equipment Needs Upon Discharge (OT) walker, rolling;commode chair  -     Anticipated Discharge Disposition (OT) home with assist;home with outpatient therapy services  -       Row Name 10/25/23 1131          Vital Signs    O2 Delivery Pre Treatment room air  -     O2 Delivery Intra  Treatment room air  -LF     O2 Delivery Post Treatment room air  -LF               User Key  (r) = Recorded By, (t) = Taken By, (c) = Cosigned By      Initials Name Provider Type     Gayle Calvo OT Occupational Therapist                   Outcome Measures       Row Name 10/25/23 1134          How much help from another is currently needed...    Putting on and taking off regular lower body clothing? 3  -LF     Bathing (including washing, rinsing, and drying) 3  -LF     Toileting (which includes using toilet bed pan or urinal) 3  -LF     Putting on and taking off regular upper body clothing 4  -LF     Taking care of personal grooming (such as brushing teeth) 4  -LF     Eating meals 4  -LF     AM-PAC 6 Clicks Score (OT) 21  -LF       Row Name 10/25/23 1100 10/25/23 0740       How much help from another person do you currently need...    Turning from your back to your side while in flat bed without using bedrails? 3  -AMBER 3  -MH    Moving from lying on back to sitting on the side of a flat bed without bedrails? 3  -AMBER 3  -MH    Moving to and from a bed to a chair (including a wheelchair)? 3  -AMBER 3  -MH    Standing up from a chair using your arms (e.g., wheelchair, bedside chair)? 3  -AMBER 3  -MH    Climbing 3-5 steps with a railing? 3  -AMBER 3  -MH    To walk in hospital room? 3  -AMBER 3  -MH    AM-PAC 6 Clicks Score (PT) 18  -AMBER 18  -MH    Highest level of mobility 6 --> Walked 10 steps or more  -AMBER 6 --> Walked 10 steps or more  -MH      Row Name 10/25/23 1134 10/25/23 1100       Functional Assessment    Outcome Measure Options AM-PAC 6 Clicks Daily Activity (OT);Optimal Instrument  -LF AM-PAC 6 Clicks Basic Mobility (PT)  -AMBER      Row Name 10/25/23 1134          Optimal Instrument    Optimal Instrument Optimal - 3  -LF     Bending/Stooping 2  -LF     Standing 2  -LF     Reaching 1  -LF     From the list, choose the 3 activities you would most like to be able to do without any difficulty  Bending/stooping;Standing;Reaching  -     Total Score Optimal - 3 5  -LF               User Key  (r) = Recorded By, (t) = Taken By, (c) = Cosigned By      Initials Name Provider Type     Cally Dueñas, RN Registered Nurse     Gayle Calvo, ROBERTO Occupational Therapist    Ag Hebert, PT Physical Therapist                    Occupational Therapy Education       Title: PT OT SLP Therapies (Done)       Topic: Occupational Therapy (Done)       Point: ADL training (Done)       Description:   Instruct learner(s) on proper safety adaptation and remediation techniques during self care or transfers.   Instruct in proper use of assistive devices.                  Learning Progress Summary             Patient Acceptance, E,TB, VU by  at 10/25/2023 1135                         Point: Precautions (Done)       Description:   Instruct learner(s) on prescribed precautions during self-care and functional transfers.                  Learning Progress Summary             Patient Acceptance, E,TB, VU by  at 10/25/2023 1135                         Point: Body mechanics (Done)       Description:   Instruct learner(s) on proper positioning and spine alignment during self-care, functional mobility activities and/or exercises.                  Learning Progress Summary             Patient Acceptance, E,TB, VU by  at 10/25/2023 1135                                         User Key       Initials Effective Dates Name Provider Type Discipline     06/16/21 -  Gayle Calvo, ROBERTO Occupational Therapist OT                  OT Recommendation and Plan  Planned Therapy Interventions (OT): activity tolerance training, BADL retraining, functional balance retraining, occupation/activity based interventions, patient/caregiver education/training, strengthening exercise, transfer/mobility retraining  Therapy Frequency (OT): 5 times/wk  Plan of Care Review  Plan of Care Reviewed With: patient  Progress: no change  Outcome  Evaluation: Patient presents with limitations in self-care, functional transfers, and balance. He would benefit from continued skilled occupational therapy services to maximize independence with ADLs/functional transfers.     Time Calculation:   Evaluation Complexity (OT)  Review Occupational Profile/Medical/Therapy History Complexity: brief/low complexity  Assessment, Occupational Performance/Identification of Deficit Complexity: 1-3 performance deficits  Clinical Decision Making Complexity (OT): problem focused assessment/low complexity  Overall Complexity of Evaluation (OT): low complexity     Time Calculation- OT       Row Name 10/25/23 1135             Time Calculation- OT    OT Received On 10/25/23  -LF      OT Goal Re-Cert Due Date 11/03/23  -LF         Timed Charges    72215 - OT Therapeutic Activity Minutes 8  -LF      73221 - OT Self Care/Mgmt Minutes 15  -LF         Untimed Charges    OT Eval/Re-eval Minutes 20  -LF         Total Minutes    Timed Charges Total Minutes 23  -LF      Untimed Charges Total Minutes 20  -LF       Total Minutes 43  -LF                User Key  (r) = Recorded By, (t) = Taken By, (c) = Cosigned By      Initials Name Provider Type    LF Gayle Calvo OT Occupational Therapist                  Therapy Charges for Today       Code Description Service Date Service Provider Modifiers Qty    92221239222 HC OT THERAPEUTIC ACT EA 15 MIN 10/25/2023 Gayle Calvo OT GO 1    64774037743 HC OT SELF CARE/MGMT/TRAIN EA 15 MIN 10/25/2023 Gayle Calvo OT GO 1    17084111519 HC OT EVAL LOW COMPLEXITY 2 10/25/2023 Gayle Calvo OT GO 1                 Gayle Calvo OT  10/25/2023

## 2023-10-25 NOTE — PLAN OF CARE
Goal Outcome Evaluation:  Plan of Care Reviewed With: patient           Outcome Evaluation: Pt presents with decreased ROM, strength, transfers and ambulation.  Skilled PT services will be required to address these mobility deficits.      Anticipated Discharge Disposition (PT): home with outpatient therapy services

## 2023-10-25 NOTE — THERAPY TREATMENT NOTE
Acute Care - Physical Therapy Treatment Note  TAMARA Beck     Patient Name: Marcio Enrique  : 1950  MRN: [id number]  Today's Date: 10/25/2023      Visit Dx:     ICD-10-CM ICD-9-CM   1. Primary osteoarthritis of left knee  M17.12 715.16   2. Osteoarthritis of left knee, unspecified osteoarthritis type  M17.12 715.96   3. Difficulty in walking  R26.2 719.7   4. Decreased activities of daily living (ADL)  Z78.9 V49.89     Patient Active Problem List   Diagnosis    Essential hypertension    Gastro-esophageal reflux disease without esophagitis    Mixed hyperlipidemia    Type 2 diabetes mellitus without complication, without long-term current use of insulin    Primary osteoarthritis involving multiple joints    Iron deficiency anemia secondary to inadequate dietary iron intake    Dyspnea on exertion    Vertigo    Seborrheic dermatitis    Medicare annual wellness visit, subsequent    Primary insomnia    Left shoulder pain    Chronic pain of left knee    Primary osteoarthritis of left knee    Osteoarthritis of left knee     Past Medical History:   Diagnosis Date    Acid reflux     Anesthesia     FAMILY AND Pt REPORT Pt WAS COMBATIVE AFTER DENTAL PROCEDURE    Dizziness     DJD (degenerative joint disease)     LEFT SHOULDER; LEFT KNEE= MRI    Essential (primary) hypertension     Gastro-esophageal reflux disease without esophagitis     High cholesterol     HTN (hypertension)     Iron deficiency anemia, unspecified     Left knee pain     Mixed hyperlipidemia     Neuropathy     Other fatigue     Partial tear of left rotator cuff     PUD (peptic ulcer disease)     Pure hypercholesterolemia     INCREASED CK WITH ALL    SOB (shortness of breath)     HAD ECHO/ STRESS NO PROBLEMS SINCE THEN. NO CARDIOLOGIST- DENIED CP/SOB    Type 2 diabetes mellitus without complication     ON MEDS- DOESN'T CHECK BG DAILY AT HOME    Unspecified osteoarthritis, unspecified site      Past Surgical History:   Procedure Laterality Date     CIRCUMCISION      KNEE ACL RECONSTRUCTION      left    KNEE SURGERY Left     CLEAN     TOOTH EXTRACTION      TOTAL KNEE ARTHROPLASTY Left 10/24/2023    Procedure: LEFT TOTAL KNEE ARTHROPLASTY WITH DAVID ROBOT;  Surgeon: Gurinder Figueroa MD;  Location: [facility] MAIN OR;  Service: Robotics - Ortho;  Laterality: Left;     PT Assessment (last 12 hours)       PT Evaluation and Treatment       Row Name 10/25/23 1248 10/25/23 1100       Physical Therapy Time and Intention    Subjective Information complains of;pain  -RH no complaints  -AMBER    Document Type therapy note (daily note)  -RH evaluation  -AMBER    Mode of Treatment individual therapy;group therapy;physical therapy  -RH individual therapy;physical therapy  -AMBER    Patient Effort good  -RH good  -AMBER    Symptoms Noted During/After Treatment -- none  -AMBER    Comment Gait performed individually; therapuetic exercises performed in a group session with 5 participants  - --      Row Name 10/25/23 1248 10/25/23 1100       General Information    Patient Observations alert;cooperative;agree to therapy  -RH alert;cooperative;agree to therapy  -AMBER    Prior Level of Function -- independent:;all household mobility;community mobility  -AMBER    Equipment Currently Used at Home -- none  -AMBER    Existing Precautions/Restrictions -- fall;weight bearing  -AMBER    Barriers to Rehab -- none identified  -AMBER      Row Name 10/25/23 1100          Living Environment    Current Living Arrangements home  -AMBER       Row Name 10/25/23 1248          Pain Scale: FACES Pre/Post-Treatment    Pain: FACES Scale, Pretreatment 2-->hurts little bit  -RH     Posttreatment Pain Rating 2-->hurts little bit  -RH     Pain Location - Side/Orientation Left  -RH     Pain Location - knee  -RH       Row Name 10/25/23 1100          Cognition    Orientation Status (Cognition) oriented x 3  -AMBER       Row Name 10/25/23 1248          Range of Motion (ROM)    Range of Motion --  Pt L knee AAROM at 88 degrees flex and 3 degrees  ext.  -RH       Row Name 10/25/23 1100          Range of Motion Comprehensive    General Range of Motion lower extremity range of motion deficits identified  -AMBER     Comment, General Range of Motion L knee 3-85° knee  -AMBER       Row Name 10/25/23 1248          Strength (Manual Muscle Testing)    Strength (Manual Muscle Testing) --  Pt L knee ext strength at 3+/5.  -       Row Name 10/25/23 1100          Strength Comprehensive (MMT)    General Manual Muscle Testing (MMT) Assessment lower extremity strength deficits identified  LLE 3+/5  -AMBER       Row Name 10/25/23 1248          Mobility    Extremity Weight-bearing Status left lower extremity  -RH     Left Lower Extremity (Weight-bearing Status) weight-bearing as tolerated (WBAT)  -       Row Name 10/25/23 1248 10/25/23 1100       Transfers    Transfers sit-stand transfer;stand-sit transfer  -RH bed-chair transfer;sit-stand transfer  -AMBER      Row Name 10/25/23 1100          Bed-Chair Transfer    Bed-Chair [place] (Transfers) contact guard  -AMBER     Assistive Device (Bed-Chair Transfers) walker, front-wheeled  -AMBER       Row Name 10/25/23 1248 10/25/23 1100       Sit-Stand Transfer    Sit-Stand [place] (Transfers) contact guard  - contact guard  -AMBER    Assistive Device (Sit-Stand Transfers) walker, front-wheeled  - walker, front-wheeled  -AMBER      Row Name 10/25/23 1248          Stand-Sit Transfer    Stand-Sit [place] (Transfers) contact guard  -RH     Assistive Device (Stand-Sit Transfers) walker, front-wheeled  -RH       Row Name 10/25/23 1248 10/25/23 1100       Gait/Stairs (Locomotion)    Gait/Stairs Locomotion gait/ambulation assistive device;gait/ambulation independence;distance ambulated;gait pattern;gait deviations  -RH gait/ambulation assistive device  -AMBER    [place] Level (Gait) contact guard  -RH contact guard  -AMBER    Assistive Device (Gait) walker, front-wheeled  -RH walker, front-wheeled  -AMBER    Patient was able to Ambulate yes  -RH  --    Distance in Feet (Gait) 50  -  -AMBER    Pattern (Gait) 3-point;step-through  -RH --    Deviations/Abnormal Patterns (Gait) gait speed decreased;stride length decreased  -RH --    Gait Assessment/Intervention Pt amb with RW and CGA with 3 point step-through gait pattern with decreased gait speed, stride length, and LLE heel strike.  - --      Row Name 10/25/23 1248 10/25/23 1100       Safety Issues, Functional Mobility    Impairments Affecting Function (Mobility) balance;pain;range of motion (ROM);strength  -RH balance;pain;range of motion (ROM);strength  -AMBER      Row Name 10/25/23 1248 10/25/23 1100       Balance    Balance Assessment standing dynamic balance  - standing dynamic balance  -AMBER    Dynamic Standing Balance contact guard  -RH contact guard  -AMBER    Position/Device Used, Standing Balance walker, front-wheeled  -RH walker, front-wheeled  -AMBER      Row Name 10/25/23 1248          Motor Skills    Therapeutic Exercise hip;knee;ankle  -       Row Name 10/25/23 1248          Hip (Therapeutic Exercise)    Hip (Therapeutic Exercise) isometric exercises  -     Hip Isometrics (Therapeutic Exercise) left;gluteal sets;10 repetitions;2 sets  -       Row Name 10/25/23 1248          Knee (Therapeutic Exercise)    Knee (Therapeutic Exercise) strengthening exercise;isometric exercises  -     Knee Isometrics (Therapeutic Exercise) left;quad sets;10 repetitions;2 sets  -     Knee Strengthening (Therapeutic Exercise) left;heel slides;SLR (straight leg raise);SAQ (short arc quad);LAQ (long arc quad);10 repetitions;2 sets  -       Row Name 10/25/23 1248          Ankle (Therapeutic Exercise)    Ankle (Therapeutic Exercise) AROM (active range of motion)  -     Ankle AROM (Therapeutic Exercise) left;dorsiflexion;plantarflexion;10 repetitions;2 sets  -       Row Name             Wound 10/24/23 1427 Left anterior knee Incision    Wound - Properties Group Placement Date: 10/24/23  -KJ Placement Time: 1427   -KJ Present on Original Admission: N  -KJ Side: Left  -KJ Orientation: anterior  -KJ Location: knee  -KJ Primary Wound Type: Incision  -KJ, x2     Retired Wound - Properties Group Placement Date: 10/24/23  -KJ Placement Time: 1427  -KJ Present on Original Admission: N  -KJ Side: Left  -KJ Orientation: anterior  -KJ Location: knee  -KJ Primary Wound Type: Incision  -KJ, x2     Retired Wound - Properties Group Date first assessed: 10/24/23  -KJ Time first assessed: 1427  -KJ Present on Original Admission: N  -KJ Side: Left  -KJ Location: knee  -KJ Primary Wound Type: Incision  -KJ, x2       Row Name 10/25/23 1100          Plan of Care Review    Plan of Care Reviewed With patient  -AMBER     Outcome Evaluation Pt presents with decreased ROM, strength, transfers and ambulation.  Skilled PT services will be required to address these mobility deficits.  -AMBER       Row Name 10/25/23 1248          Positioning and Restraints    Post Treatment Position chair  -RH     In Chair call light within reach;encouraged to call for assist;sitting  -       Row Name 10/25/23 1100          Therapy Assessment/Plan (PT)    Patient/Family Therapy Goals Statement (PT) Pt wants to walk withou pain  -AMBER     Rehab Potential (PT) good, to achieve stated therapy goals  -AMBER     Criteria for Skilled Interventions Met (PT) skilled treatment is necessary  -AMBER     Therapy Frequency (PT) 2 times/day  -AMBER     Predicted Duration of Therapy Intervention (PT) 10 days  -AMBER     Problem List (PT) problems related to;balance;mobility;range of motion (ROM);strength;pain  -AMBER     Activity Limitations Related to Problem List (PT) unable to ambulate safely;unable to transfer safely  -AMBER       Row Name 10/25/23 1100          PT Evaluation Complexity    History, PT Evaluation Complexity no personal factors and/or comorbidities  -AMBER     Examination of Body Systems (PT Eval Complexity) total of 4 or more elements  -AMBER     Clinical Presentation (PT Evaluation Complexity)  stable  -AMBER     Clinical Decision Making (PT Evaluation Complexity) low complexity  -AMBER     Overall Complexity (PT Evaluation Complexity) low complexity  -AMBER       Row Name 10/25/23 1248          Progress Summary (PT)    Progress Toward Functional Goals (PT) progress toward functional goals is good  -RH     Daily Progress Summary (PT) Pt is progressing well with their exercise program.  Will continue current plan of care.  -RH       Row Name 10/25/23 1100          Therapy Plan Review/Discharge Plan (PT)    Therapy Plan Review (PT) evaluation/treatment results reviewed;participants voiced agreement with care plan;participants included;patient  -AMBER       Row Name 10/25/23 1100          Physical Therapy Goals    Transfer Goal Selection (PT) transfer, PT goal 1  -AMBER     Gait Training Goal Selection (PT) gait training, PT goal 1  -AMBER     ROM Goal Selection (PT) ROM, PT goal 1  -AMBER     Strength Goal Selection (PT) strength, PT goal 1  -AMBER       Row Name 10/25/23 1100          Transfer Goal 1 (PT)    Activity/Assistive Device (Transfer Goal 1, PT) transfers, all  -AMBER     [place] Level/Cues Needed (Transfer Goal 1, PT) independent  -AMBER     Time Frame (Transfer Goal 1, PT) long term goal (LTG);10 days  -AMBER       Row Name 10/25/23 1100          Gait Training Goal 1 (PT)    Activity/Assistive Device (Gait Training Goal 1, PT) gait (walking locomotion);walker, rolling  -AMEBR     [place] Level (Gait Training Goal 1, PT) independent  -AMBER     Distance (Gait Training Goal 1, PT) 300  -AMBER     Time Frame (Gait Training Goal 1, PT) long term goal (LTG);10 days  -AMBER       Row Name 10/25/23 1100          ROM Goal 1 (PT)    ROM Goal 1 (PT) Pt will demonstrate knee ROM from 0-110° on the affected side.  -AMBER     Time Frame (ROM Goal 1, PT) long-term goal (LTG);10 days  -AMBER       Row Name 10/25/23 1100          Strength Goal 1 (PT)    Strength Goal 1 (PT) Pt will demonstrate knee extension strength of 5/5 on the affected side.  -AMBER      Time Frame (Strength Goal 1, PT) long term goal (LTG);10 days  -AMBER               User Key  (r) = Recorded By, (t) = Taken By, (c) = Cosigned By      Initials Name Provider Type    RH Darryl Burch, PTA Physical Therapist Assistant    Ag Hebert, PT Physical Therapist    Anna Ferreira RN Registered Nurse                    Physical Therapy Education       Title: PT OT SLP Therapies (Resolved)       Topic: Physical Therapy (Resolved)       Point: Mobility training (Resolved)       Learning Progress Summary             Patient Acceptance, E,TB, VU by AMBER at 10/25/2023 1118                         Point: Precautions (Resolved)       Learning Progress Summary             Patient Acceptance, E,TB, VU by AMBER at 10/25/2023 1118                                         User Key       Initials Effective Dates Name Provider Type Discipline    AMBER 06/03/21 -  Ag Hameed, PT Physical Therapist PT                  PT Recommendation and Plan     Progress Summary (PT)  Progress Toward Functional Goals (PT): progress toward functional goals is good  Daily Progress Summary (PT): Pt is progressing well with their exercise program.  Will continue current plan of care.   Outcome Measures       Row Name 10/25/23 1200 10/25/23 1100          How much help from another person do you currently need...    Turning from your back to your side while in flat bed without using bedrails? 3  -RH 3  -AMBER     Moving from lying on back to sitting on the side of a flat bed without bedrails? 3  -RH 3  -AMBER     Moving to and from a bed to a chair (including a wheelchair)? 3  -RH 3  -AMBER     Standing up from a chair using your arms (e.g., wheelchair, bedside chair)? 3  -RH 3  -AMBER     Climbing 3-5 steps with a railing? 4  -RH 3  -AMBER     To walk in hospital room? 4  -RH 3  -AMBER     AM-PAC 6 Clicks Score (PT) 20  -RH 18  -AMBER     Highest level of mobility 6 --> Walked 10 steps or more  -RH 6 --> Walked 10 steps or more  -AMBER        Functional  Assessment    Outcome Measure Options -- AM-PAC 6 Clicks Basic Mobility (PT)  -AMBER               User Key  (r) = Recorded By, (t) = Taken By, (c) = Cosigned By      Initials Name Provider Type    Darryl Mcdaniels PTA Physical Therapist Assistant    Ag Hebert, PT Physical Therapist                     Time Calculation:    PT Charges       Row Name 10/25/23 1247 10/25/23 1114          Time Calculation    PT Received On 10/25/23  -RH 10/25/23  -AMBER     PT Goal Re-Cert Due Date -- 11/03/23  -AMBER        Timed Charges    00613 - Gait Training Minutes  9  -RH --     71741 - PT Therapeutic Activity Minutes 4  -RH --        Untimed Charges    PT Eval/Re-eval Minutes -- 30  -AMBER     PT Group Therapy Minutes 35  -RH --        Total Minutes    Timed Charges Total Minutes 13  -RH --     Untimed Charges Total Minutes 35  -RH 30  -AMBER      Total Minutes 48  -RH 30  -AMBER               User Key  (r) = Recorded By, (t) = Taken By, (c) = Cosigned By      Initials Name Provider Type    Darryl Mcdaniels PTA Physical Therapist Assistant    Ag Hebert, PT Physical Therapist                  Therapy Charges for Today       Code Description Service Date Service Provider Modifiers Qty    65254170916 HC GAIT TRAINING EA 15 MIN 10/25/2023 Darryl Burch PTA GP 1    47982305530 HC PT THER PROC GROUP 10/25/2023 Darryl Burch PTA GP 1            PT G-Codes  Outcome Measure Options: AM-PAC 6 Clicks Daily Activity (OT), Optimal Instrument  AM-PAC 6 Clicks Score (PT): 20  AM-PAC 6 Clicks Score (OT): 21    Darryl Burch PTA  10/25/2023

## 2023-10-25 NOTE — PLAN OF CARE
Goal Outcome Evaluation:      Pt complained of pain once during the shift, which was managed with PRN oral medication. VSS. Pt was able to ambulate without difficulty. Pt is expecting to go home, and use outpatient therapy upon discharge. Ruben Martinez RN

## 2023-10-25 NOTE — OP NOTE
TOTAL KNEE ARTHROPLASTY WITH DAVID ROBOT  Procedure Report    Patient Name:  Marcio Enrique  YOB: 1950    Date of Surgery:  10/24/2023       Pre-op Diagnosis:   Osteoarthritis of left knee, unspecified osteoarthritis type [M17.12]       Post-Op Diagnosis Codes:     * Osteoarthritis of left knee, unspecified osteoarthritis type [M17.12]    Procedure/CPT® Codes:      Procedure(s):  LEFT TOTAL KNEE ARTHROPLASTY WITH DAVID ROBOT    Surgical Approach: Knee Medial Parapatellar        Staff:  Surgeon(s):  Gurinder Figueroa MD    Assistant: Peg Headley RN; Viviana Malik    Anesthesia: General    Estimated Blood Loss: 50ml    Implants:    Implant Name Type Inv. Item Serial No.  Lot No. LRB No. Used Action   CMT BONE PALACOS R HI/VISC 1X40 - [id number] Implant CMT BONE PALACOS R HI/VISC 1X40  [facility] [id number] Left 2 Implanted   ART/SRF KN PERSONA/VE PS MC GH 8TO11 10MM LT - [id number] Implant ART/SRF KN PERSONA/VE PS MC GH 8TO11 10MM LT  [employer] [id number] Left 1 Implanted   STEM TIB/KN PERSONA CMT 5D SZH LT - [id number] Implant STEM TIB/KN PERSONA CMT 5D SZH LT  [employer] [id number] Left 1 Implanted   COMP FEM/KN PERSONA CR CMT COCR STD SZ11 LT - [id number] Implant COMP FEM/KN PERSONA CR CMT COCR STD SZ11 LT  [employer] [id number] Left 1 Implanted   PAT KN PERSONA ALLPOLY CMT 35MM - [id number] Implant PAT KN PERSONA ALLPOLY CMT 35MM  [employer] [id number] Left 1 Implanted   CAP TOTL KN CMT PRIMARY W/DAVID - [id number] Implant CAP TOTL KN CMT PRIMARY W/DAVID  [employer]  Left 1 Implanted       Specimen:          None    Findings: See description    Complications: None    Description of Procedure: Patient was taken to the operating room placed supine operating table after abductor canal blocks and in preoperative holding.  After general tracheal anesthesia was established the left knee and lower extremity were prepped and draped in standard surgical fashion using alcohol  ChloraPrep.  The Flor robot was also draped sterilely and calibrated.  Incision was made 2 fingerbreadth superior superior pole of the patella down to the medial aspect of tibial tubercle the knife and full-thickness skin flaps were raised laterally and medially.  A medial parapatellar arthrotomy was then undertaken and the knee was brought into flexion.  Retractors were placed to protect protect the collateral ligaments.  Soft tissue releases and osteophytes removed as deemed necessary.  Then the tracking device was mounted on the distal femur in a standard fashion as well as through separate stab incisions in the distal tibia 5 fingerbreadths inferior to the tibial tubercle.   then all the femoral points were mapped out using the Flor software the tibial points were mapped out as well.  While the plan for resection was being completed the patella was everted calipered and cut to the appropriate thickness.  The correct size patella was chosen in the locals for the patella were reamed and a trial patella was placed and the knee was brought back into flexion.  The distal femoral cutting block was then brought in using robotic assisted arm and the distal femoral cut was made it was checked and verified and accepted.  Then the external rotation arm of the robot was used to pin the distal femur and the correct external rotation decided by the intraoperative plan using the previously mapped points.  Then the tibia was cut after removing the robotic arm and to the appropriate position to cut the tibia the cutting block was pinned and the proximal tibia cut was made excess bone from the cut was removed and the 4-in-1 cutting block was then used in the distal femur.  The tibial cut was verified and accepted femoral and tibial trials were then placed and the knee was taken through range of motion verifying flexion extension gaps and extension and flexion range of motion to be acceptable by using the software in a standard  fashion.  Locals for the femur were then drilled the trials were removed the bone ends were copiously irrigated with bacitracin Simpulse irrigation.  The tibia was cemented in place according to marked external rotation from the trials the femur was cemented in position second and then the real polychosen was placed.  Excess cement removed from the implant edges the knee was held in extension until the cement dried and the patella was cemented into place and held with a patellar clamp.  After the cement hardened excess management from the implant edges Irrisept was used and wound was copiously irrigated the knee was taken through range of motion and checked 1 last time the patella tracked in the center of the trochlear groove the femur using no thumbs technique.  Then the arthrotomy was closed in 45 degrees of flexion with Ethibond the deep fat was closed 0 Vicryl subcu was closed with 2-0 Vicryl and the skin was closed with staples incision was washed and dried and sterile dressings were applied the patient tolerated the procedure well and was taken to recovery room.       Gurinder Figueroa MD     Date: 10/25/2023  Time: 07:20 EDT

## 2023-10-25 NOTE — PROGRESS NOTES
[facility]   Hospitalist Progress Note  Date: 10/25/2023  Patient Name: Marcio Enrique  : 1950  MRN: [id number]  Date of admission: 10/24/2023      Subjective   Subjective     Chief Complaint: Medical management    Summary: 73-year-old male past medical history significant for hypertension hyperlipidemia well-controlled type 2 diabetes with hemoglobin A1c less than 7 underwent knee arthroplasty hospitalist consulted for medical management    Interval Followup: Patient seen and examined resting comfortably postoperative labs reviewed slight elevation noted in serum creatinine but he is urinating well we will hold Toradol would recommend avoiding NSAIDs repeat chemistries in 3 to 5 days    Review of systems: All systems reviewed negative except following: Patient complains of generalized weakness fatigue    Objective   Objective     Vitals:   Temp:  [97 °F (36.1 °C)-97.9 °F (36.6 °C)] 97.9 °F (36.6 °C)  Heart Rate:  [54-80] 58  Resp:  [14-18] 17  BP: (106-165)/(59-77) 154/69    Physical Exam   Constitutional: Awake alert oriented no acute distress  Respiratory: Clear breath sounds are bilaterally  Cardiovascular: RRR  GI: Abdomen soft nontender bowel sounds present    Result Review    Result Review:  I have personally reviewed the results from the time of this admission to 10/25/2023 11:55 EDT and agree with these findings:  []  Laboratory  []  Microbiology  []  Radiology  []  EKG/Telemetry   []  Cardiology/Vascular   []  Pathology  []  Old records  []  Other:    Assessment & Plan   Assessment / Plan   Assessment:    Hypertension  Hyperlipidemia  Mild AGNIESZKA  Osteoarthritis    Plan:    Resume beta-blocker  Continue IV fluids  Discontinue Toradol  Avoid NSAIDs and all nephrotoxic's  Repeat chemistries 5 to 7 days  PT OT evaluations  Discussed with RN     Discussed plan with RN.    DVT prophylaxis:  Medical and mechanical DVT prophylaxis orders are present.    CODE STATUS:          Electronically signed by Sean  ZAIRA Michael, 10/25/23, 11:55 AM EDT.      Attending Documentation:  Patient independently seen and evaluated, above documentation reflects plan put forth during bedside rounds.  More than 51% of the time of this patient encounter was performed by me. I discussed the care plan with HAZEL Michael PA-C, I agree with his findings and plan as documented, what I have added to the care plan and modified is as follows in my documentation and my medical decision making; 73-year-old male here with knee arthroplasty.  Medicine service consulted for medical management.  Patient has a new mild AGNIESZKA today, creatinine up above his baseline.  Discussed with patient, he is urinating well.  Needs to avoid Toradol/NSAIDs.  Continued him on some IV fluids through the day but I think he is fine to discharge home.  He is urinating well.  Suspect once he is taking p.o. again and with the additional fluids it should resolve.  Discussed having him follow-up with his PCP within a week to recheck.  Electronically signed by Juan Multani MD, 10/25/23, 3:51 PM EDT.

## 2023-10-25 NOTE — SIGNIFICANT NOTE
10/25/23 0751   Plan   Final Discharge Disposition Code 01 - home or self-care   Final Note Home with Outpatient Therapy at [facility] in [place]. Appt: 10/26/23 at 2PM.

## 2023-10-25 NOTE — THERAPY EVALUATION
Acute Care - Physical Therapy Initial Evaluation  TAMARA Beck     Patient Name: Marcio Enrique  : 1950  MRN: [id number]  Today's Date: 10/25/2023     Admit date: 10/24/2023     Referring Physician: Juan Multani MD     Surgery Date:10/24/2023   Procedure(s) (LRB):  LEFT TOTAL KNEE ARTHROPLASTY WITH DAVID ROBOT (Left)          Visit Dx:     ICD-10-CM ICD-9-CM   1. Primary osteoarthritis of left knee  M17.12 715.16   2. Osteoarthritis of left knee, unspecified osteoarthritis type  M17.12 715.96   3. Difficulty in walking  R26.2 719.7     Patient Active Problem List   Diagnosis    Essential hypertension    Gastro-esophageal reflux disease without esophagitis    Mixed hyperlipidemia    Type 2 diabetes mellitus without complication, without long-term current use of insulin    Primary osteoarthritis involving multiple joints    Iron deficiency anemia secondary to inadequate dietary iron intake    Dyspnea on exertion    Vertigo    Seborrheic dermatitis    Medicare annual wellness visit, subsequent    Primary insomnia    Left shoulder pain    Chronic pain of left knee    Primary osteoarthritis of left knee    Osteoarthritis of left knee     Past Medical History:   Diagnosis Date    Acid reflux     Anesthesia     FAMILY AND Pt REPORT Pt WAS COMBATIVE AFTER DENTAL PROCEDURE    Dizziness     DJD (degenerative joint disease)     LEFT SHOULDER; LEFT KNEE= MRI    Essential (primary) hypertension     Gastro-esophageal reflux disease without esophagitis     High cholesterol     HTN (hypertension)     Iron deficiency anemia, unspecified     Left knee pain     Mixed hyperlipidemia     Neuropathy     Other fatigue     Partial tear of left rotator cuff     PUD (peptic ulcer disease)     Pure hypercholesterolemia     INCREASED CK WITH ALL    SOB (shortness of breath)     HAD ECHO/ STRESS NO PROBLEMS SINCE THEN. NO CARDIOLOGIST- DENIED CP/SOB    Type 2 diabetes mellitus without complication     ON MEDS- DOESN'T CHECK BG DAILY AT  HOME    Unspecified osteoarthritis, unspecified site      Past Surgical History:   Procedure Laterality Date    CIRCUMCISION      KNEE ACL RECONSTRUCTION      left    KNEE SURGERY Left     CLEAN     TOOTH EXTRACTION      TOTAL KNEE ARTHROPLASTY Left 10/24/2023    Procedure: LEFT TOTAL KNEE ARTHROPLASTY WITH DAVID ROBOT;  Surgeon: Gurinder Figueroa MD;  Location: [facility] MAIN OR;  Service: Robotics - Ortho;  Laterality: Left;     PT Assessment (last 12 hours)       PT Evaluation and Treatment       Row Name 10/25/23 1100          Physical Therapy Time and Intention    Subjective Information no complaints  -AMBER     Document Type evaluation  -AMBER     Mode of Treatment individual therapy;physical therapy  -AMBER     Patient Effort good  -AMBER     Symptoms Noted During/After Treatment none  -AMBER       Row Name 10/25/23 1100          General Information    Patient Observations alert;cooperative;agree to therapy  -AMBER     Prior Level of Function independent:;all household mobility;community mobility  -AMBER     Equipment Currently Used at Home none  -AMBER     Existing Precautions/Restrictions fall;weight bearing  -AMBER     Barriers to Rehab none identified  -AMBER       Row Name 10/25/23 1100          Living Environment    Current Living Arrangements home  -AMBER       Row Name 10/25/23 1100          Cognition    Orientation Status (Cognition) oriented x 3  -AMBER       Row Name 10/25/23 1100          Range of Motion Comprehensive    General Range of Motion lower extremity range of motion deficits identified  -AMBER     Comment, General Range of Motion L knee 3-85° knee  -AMBER       Row Name 10/25/23 1100          Strength Comprehensive (MMT)    General Manual Muscle Testing (MMT) Assessment lower extremity strength deficits identified  LLE 3+/5  -AMBER       Row Name 10/25/23 1100          Transfers    Transfers bed-chair transfer;sit-stand transfer  -AMBER       Row Name 10/25/23 1100          Bed-Chair Transfer    Bed-Chair [place] (Transfers) contact  guard  -AMBER     Assistive Device (Bed-Chair Transfers) walker, front-wheeled  -AMBER       Row Name 10/25/23 1100          Sit-Stand Transfer    Sit-Stand [place] (Transfers) contact guard  -AMBER     Assistive Device (Sit-Stand Transfers) walker, front-wheeled  -AMBER       Row Name 10/25/23 1100          Gait/Stairs (Locomotion)    Gait/Stairs Locomotion gait/ambulation assistive device  -AMBER     [place] Level (Gait) contact guard  -AMBER     Assistive Device (Gait) walker, front-wheeled  -AMBER     Distance in Feet (Gait) 100  -AMBER       Row Name 10/25/23 1100          Safety Issues, Functional Mobility    Impairments Affecting Function (Mobility) balance;pain;range of motion (ROM);strength  -AMBER       Row Name 10/25/23 1100          Balance    Balance Assessment standing dynamic balance  -AMBER     Dynamic Standing Balance contact guard  -AMBER     Position/Device Used, Standing Balance walker, front-wheeled  -AMBER       Row Name             Wound 10/24/23 1427 Left anterior knee Incision    Wound - Properties Group Placement Date: 10/24/23  -KJ Placement Time: 1427  -KJ Present on Original Admission: N  -KJ Side: Left  -KJ Orientation: anterior  -KJ Location: knee  -KJ Primary Wound Type: Incision  -KJ, x2     Retired Wound - Properties Group Placement Date: 10/24/23  -KJ Placement Time: 1427  -KJ Present on Original Admission: N  -KJ Side: Left  -KJ Orientation: anterior  -KJ Location: knee  -KJ Primary Wound Type: Incision  -KJ, x2     Retired Wound - Properties Group Date first assessed: 10/24/23  -KJ Time first assessed: 1427  -KJ Present on Original Admission: N  -KJ Side: Left  -KJ Location: knee  -KJ Primary Wound Type: Incision  -KJ, x2       Row Name 10/25/23 1100          Plan of Care Review    Plan of Care Reviewed With patient  -AMBER     Outcome Evaluation Pt presents with decreased ROM, strength, transfers and ambulation.  Skilled PT services will be required to address these mobility deficits.  -AMBER       Row Name  10/25/23 1100          Therapy Assessment/Plan (PT)    Patient/Family Therapy Goals Statement (PT) Pt wants to walk withou pain  -AMBER     Rehab Potential (PT) good, to achieve stated therapy goals  -AMBER     Criteria for Skilled Interventions Met (PT) skilled treatment is necessary  -AMBER     Therapy Frequency (PT) 2 times/day  -AMBER     Predicted Duration of Therapy Intervention (PT) 10 days  -AMBER     Problem List (PT) problems related to;balance;mobility;range of motion (ROM);strength;pain  -AMBER     Activity Limitations Related to Problem List (PT) unable to ambulate safely;unable to transfer safely  -AMBER       Row Name 10/25/23 1100          PT Evaluation Complexity    History, PT Evaluation Complexity no personal factors and/or comorbidities  -AMBER     Examination of Body Systems (PT Eval Complexity) total of 4 or more elements  -AMBER     Clinical Presentation (PT Evaluation Complexity) stable  -AMBER     Clinical Decision Making (PT Evaluation Complexity) low complexity  -AMBER     Overall Complexity (PT Evaluation Complexity) low complexity  -AMBER       Row Name 10/25/23 1100          Therapy Plan Review/Discharge Plan (PT)    Therapy Plan Review (PT) evaluation/treatment results reviewed;participants voiced agreement with care plan;participants included;patient  -AMBER       Row Name 10/25/23 1100          Physical Therapy Goals    Transfer Goal Selection (PT) transfer, PT goal 1  -AMBER     Gait Training Goal Selection (PT) gait training, PT goal 1  -AMBER     ROM Goal Selection (PT) ROM, PT goal 1  -AMBER     Strength Goal Selection (PT) strength, PT goal 1  -AMBER       Row Name 10/25/23 1100          Transfer Goal 1 (PT)    Activity/Assistive Device (Transfer Goal 1, PT) transfers, all  -AMBER     [place] Level/Cues Needed (Transfer Goal 1, PT) independent  -AMBER     Time Frame (Transfer Goal 1, PT) long term goal (LTG);10 days  -AMBER       Row Name 10/25/23 1100          Gait Training Goal 1 (PT)    Activity/Assistive Device (Gait Training  Goal 1, PT) gait (walking locomotion);walker, rolling  -AMBER     [place] Level (Gait Training Goal 1, PT) independent  -AMBER     Distance (Gait Training Goal 1, PT) 300  -AMBER     Time Frame (Gait Training Goal 1, PT) long term goal (LTG);10 days  -AMBER       Row Name 10/25/23 1100          ROM Goal 1 (PT)    ROM Goal 1 (PT) Pt will demonstrate knee ROM from 0-110° on the affected side.  -AMBER     Time Frame (ROM Goal 1, PT) long-term goal (LTG);10 days  -AMBER       Row Name 10/25/23 1100          Strength Goal 1 (PT)    Strength Goal 1 (PT) Pt will demonstrate knee extension strength of 5/5 on the affected side.  -AMBER     Time Frame (Strength Goal 1, PT) long term goal (LTG);10 days  -AMBER               User Key  (r) = Recorded By, (t) = Taken By, (c) = Cosigned By      Initials Name Provider Type    AMBER Ag Hameed PT Physical Therapist    Anna Ferreira RN Registered Nurse                    Physical Therapy Education       Title: PT OT SLP Therapies (Done)       Topic: Physical Therapy (Done)       Point: Mobility training (Done)       Learning Progress Summary             Patient Acceptance, E,TB, VU by AMBER at 10/25/2023 1118                         Point: Precautions (Done)       Learning Progress Summary             Patient Acceptance, E,TB, VU by AMBER at 10/25/2023 1118                                         User Key       Initials Effective Dates Name Provider Type Discipline    AMBER 06/03/21 -  Ag Hameed PT Physical Therapist PT                  PT Recommendation and Plan  Anticipated Discharge Disposition (PT): home with outpatient therapy services  Planned Therapy Interventions (PT): balance training, bed mobility training, gait training, home exercise program, stair training, ROM (range of motion), strengthening, stretching, transfer training  Therapy Frequency (PT): 2 times/day  Plan of Care Reviewed With: patient  Outcome Evaluation: Pt presents with decreased ROM, strength, transfers and  ambulation.  Skilled PT services will be required to address these mobility deficits.   Outcome Measures       Row Name 10/25/23 1100             How much help from another person do you currently need...    Turning from your back to your side while in flat bed without using bedrails? 3  -AMBER      Moving from lying on back to sitting on the side of a flat bed without bedrails? 3  -AMBER      Moving to and from a bed to a chair (including a wheelchair)? 3  -AMBER      Standing up from a chair using your arms (e.g., wheelchair, bedside chair)? 3  -AMBER      Climbing 3-5 steps with a railing? 3  -AMBER      To walk in hospital room? 3  -AMBER      AM-PAC 6 Clicks Score (PT) 18  -AMBER      Highest level of mobility 6 --> Walked 10 steps or more  -AMBER         Functional Assessment    Outcome Measure Options AM-PAC 6 Clicks Basic Mobility (PT)  -AMBER                User Key  (r) = Recorded By, (t) = Taken By, (c) = Cosigned By      Initials Name Provider Type    Ag Hebert PT Physical Therapist                     Time Calculation:    PT Charges       Row Name 10/25/23 1114             Time Calculation    PT Received On 10/25/23  -AMBER      PT Goal Re-Cert Due Date 11/03/23  -AMBER         Untimed Charges    PT Eval/Re-eval Minutes 30  -AMBER         Total Minutes    Untimed Charges Total Minutes 30  -AMBER       Total Minutes 30  -AMBER                User Key  (r) = Recorded By, (t) = Taken By, (c) = Cosigned By      Initials Name Provider Type    Ag Hebert PT Physical Therapist                  Therapy Charges for Today       Code Description Service Date Service Provider Modifiers Qty    [id number]  PT EVAL LOW COMPLEXITY 3 10/25/2023 Ag Hameed PT GP 1            PT G-Codes  Outcome Measure Options: AM-PAC 6 Clicks Basic Mobility (PT)  AM-PAC 6 Clicks Score (PT): 18    Ag Hameed PT  10/25/2023

## 2023-10-25 NOTE — PLAN OF CARE
Problem: Adult Inpatient Plan of Care  Goal: Plan of Care Review  Outcome: Met  Goal: Patient-Specific Goal (Individualized)  Outcome: Met  Goal: Absence of Hospital-Acquired Illness or Injury  Outcome: Met  Intervention: Identify and Manage Fall Risk  Recent Flowsheet Documentation  Taken 10/25/2023 0840 by Cally Dueñas RN  Safety Promotion/Fall Prevention: safety round/check completed  Taken 10/25/2023 0740 by Cally Dueñas RN  Safety Promotion/Fall Prevention: safety round/check completed  Intervention: Prevent Skin Injury  Recent Flowsheet Documentation  Taken 10/25/2023 0740 by Cally Dueñas RN  Body Position: position changed independently  Intervention: Prevent and Manage VTE (Venous Thromboembolism) Risk  Recent Flowsheet Documentation  Taken 10/25/2023 0800 by Cally Dueñas RN  Activity Management: up in chair  Taken 10/25/2023 0740 by Cally Dueñas RN  Activity Management: up in chair  VTE Prevention/Management:   bilateral   compression stockings on  Range of Motion: ROM (range of motion) performed  Intervention: Prevent Infection  Recent Flowsheet Documentation  Taken 10/25/2023 0740 by Cally Dueñas RN  Infection Prevention:   environmental surveillance performed   hand hygiene promoted   rest/sleep promoted   single patient room provided   visitors restricted/screened  Goal: Optimal Comfort and Wellbeing  Outcome: Met  Intervention: Monitor Pain and Promote Comfort  Recent Flowsheet Documentation  Taken 10/25/2023 0740 by Cally Dueñas RN  Pain Management Interventions:   see MAR   quiet environment facilitated   care clustered   cold applied  Intervention: Provide Person-Centered Care  Recent Flowsheet Documentation  Taken 10/25/2023 0740 by Cally Dueñas RN  Trust Relationship/Rapport:   care explained   emotional support provided   questions answered   thoughts/feelings acknowledged  Goal: Readiness for Transition of Care  Outcome: Met     Problem: Fall Injury Risk  Goal:  Absence of Fall and Fall-Related Injury  Outcome: Met  Intervention: Identify and Manage Contributors  Recent Flowsheet Documentation  Taken 10/25/2023 0840 by Cally Dueñas RN  Medication Review/Management: medications reviewed  Taken 10/25/2023 0740 by Cally Dueñas RN  Medication Review/Management: medications reviewed  Self-Care Promotion: independence encouraged  Intervention: Promote Injury-Free Environment  Recent Flowsheet Documentation  Taken 10/25/2023 0840 by Cally Dueñas RN  Safety Promotion/Fall Prevention: safety round/check completed  Taken 10/25/2023 0740 by Cally Dueñas RN  Safety Promotion/Fall Prevention: safety round/check completed     Problem: Hypertension Comorbidity  Goal: Blood Pressure in Desired Range  Outcome: Met  Intervention: Maintain Blood Pressure Management  Recent Flowsheet Documentation  Taken 10/25/2023 0840 by Cally Dueñas RN  Medication Review/Management: medications reviewed  Taken 10/25/2023 0740 by Cally Dueñas RN  Medication Review/Management: medications reviewed   Goal Outcome Evaluation:   Pt has had no new changes throughout shift and continues to remain stable. Pt had left TKA with the murali robot performed 10/24 by Dr Figueroa. Pt has had no complaints of pain. Pt is x1 assist with walker and gait belt. Pt voids without issue. Pt will DC home this afternoon with son as ride. Pt will need DME walker and 3 in 1. Pt will do outpatient therapy and use the meds to bed program.

## 2023-10-25 NOTE — PLAN OF CARE
Goal Outcome Evaluation:  Plan of Care Reviewed With: patient        Progress: no change  Outcome Evaluation: Patient presents with limitations in self-care, functional transfers, and balance. He would benefit from continued skilled occupational therapy services to maximize independence with ADLs/functional transfers.      Anticipated Discharge Disposition (OT): home with assist, home with outpatient therapy services

## 2023-10-30 ENCOUNTER — OFFICE VISIT (OUTPATIENT)
Dept: INTERNAL MEDICINE | Facility: CLINIC | Age: 73
End: 2023-10-30
Payer: MEDICARE

## 2023-10-30 VITALS
BODY MASS INDEX: 30.11 KG/M2 | TEMPERATURE: 98 F | WEIGHT: 255 LBS | OXYGEN SATURATION: 98 % | SYSTOLIC BLOOD PRESSURE: 138 MMHG | HEART RATE: 66 BPM | HEIGHT: 77 IN | DIASTOLIC BLOOD PRESSURE: 80 MMHG

## 2023-10-30 DIAGNOSIS — I10 ESSENTIAL HYPERTENSION: ICD-10-CM

## 2023-10-30 DIAGNOSIS — T45.4X5A ADVERSE EFFECT OF IRON, INITIAL ENCOUNTER: ICD-10-CM

## 2023-10-30 DIAGNOSIS — Z09 HOSPITAL DISCHARGE FOLLOW-UP: Primary | ICD-10-CM

## 2023-10-30 DIAGNOSIS — R53.83 OTHER FATIGUE: ICD-10-CM

## 2023-10-30 DIAGNOSIS — D50.8 IRON DEFICIENCY ANEMIA SECONDARY TO INADEQUATE DIETARY IRON INTAKE: ICD-10-CM

## 2023-10-30 PROBLEM — G89.29 CHRONIC PAIN OF LEFT KNEE: Status: RESOLVED | Noted: 2023-09-19 | Resolved: 2023-10-30

## 2023-10-30 PROBLEM — M25.562 CHRONIC PAIN OF LEFT KNEE: Status: RESOLVED | Noted: 2023-09-19 | Resolved: 2023-10-30

## 2023-10-30 LAB
BASOPHILS # BLD AUTO: 0.02 10*3/MM3 (ref 0–0.2)
BASOPHILS NFR BLD AUTO: 0.3 % (ref 0–1.5)
DEPRECATED RDW RBC AUTO: 39.8 FL (ref 37–54)
EOSINOPHIL # BLD AUTO: 0.06 10*3/MM3 (ref 0–0.4)
EOSINOPHIL NFR BLD AUTO: 0.8 % (ref 0.3–6.2)
ERYTHROCYTE [DISTWIDTH] IN BLOOD BY AUTOMATED COUNT: 12.2 % (ref 12.3–15.4)
FOLATE SERPL-MCNC: 17.2 NG/ML (ref 4.78–24.2)
HCT VFR BLD AUTO: 31.2 % (ref 37.5–51)
HGB BLD-MCNC: 10 G/DL (ref 13–17.7)
IMM GRANULOCYTES # BLD AUTO: 0.02 10*3/MM3 (ref 0–0.05)
IMM GRANULOCYTES NFR BLD AUTO: 0.3 % (ref 0–0.5)
LYMPHOCYTES # BLD AUTO: 1.06 10*3/MM3 (ref 0.7–3.1)
LYMPHOCYTES NFR BLD AUTO: 14.2 % (ref 19.6–45.3)
MCH RBC QN AUTO: 28.7 PG (ref 26.6–33)
MCHC RBC AUTO-ENTMCNC: 32.1 G/DL (ref 31.5–35.7)
MCV RBC AUTO: 89.7 FL (ref 79–97)
MONOCYTES # BLD AUTO: 0.83 10*3/MM3 (ref 0.1–0.9)
MONOCYTES NFR BLD AUTO: 11.1 % (ref 5–12)
NEUTROPHILS NFR BLD AUTO: 5.46 10*3/MM3 (ref 1.7–7)
NEUTROPHILS NFR BLD AUTO: 73.3 % (ref 42.7–76)
NRBC BLD AUTO-RTO: 0 /100 WBC (ref 0–0.2)
PLATELET # BLD AUTO: 345 10*3/MM3 (ref 140–450)
PMV BLD AUTO: 9.7 FL (ref 6–12)
RBC # BLD AUTO: 3.48 10*6/MM3 (ref 4.14–5.8)
VIT B12 BLD-MCNC: 604 PG/ML (ref 211–946)
WBC NRBC COR # BLD: 7.45 10*3/MM3 (ref 3.4–10.8)

## 2023-10-30 PROCEDURE — 84439 ASSAY OF FREE THYROXINE: CPT | Performed by: INTERNAL MEDICINE

## 2023-10-30 PROCEDURE — 83540 ASSAY OF IRON: CPT | Performed by: INTERNAL MEDICINE

## 2023-10-30 PROCEDURE — 82607 VITAMIN B-12: CPT | Performed by: INTERNAL MEDICINE

## 2023-10-30 PROCEDURE — 85025 COMPLETE CBC W/AUTO DIFF WBC: CPT | Performed by: INTERNAL MEDICINE

## 2023-10-30 PROCEDURE — 82728 ASSAY OF FERRITIN: CPT | Performed by: INTERNAL MEDICINE

## 2023-10-30 PROCEDURE — 82746 ASSAY OF FOLIC ACID SERUM: CPT | Performed by: INTERNAL MEDICINE

## 2023-10-30 PROCEDURE — 84466 ASSAY OF TRANSFERRIN: CPT | Performed by: INTERNAL MEDICINE

## 2023-10-30 PROCEDURE — 84443 ASSAY THYROID STIM HORMONE: CPT | Performed by: INTERNAL MEDICINE

## 2023-10-30 PROCEDURE — 80069 RENAL FUNCTION PANEL: CPT | Performed by: INTERNAL MEDICINE

## 2023-10-30 RX ORDER — PRAVASTATIN SODIUM 20 MG
TABLET ORAL
Qty: 90 TABLET | Refills: 1 | Status: SHIPPED | OUTPATIENT
Start: 2023-10-30

## 2023-10-30 RX ORDER — DOXAZOSIN 2 MG/1
2 TABLET ORAL NIGHTLY
COMMUNITY

## 2023-10-30 NOTE — Clinical Note
Patient was on amlodipine/valsartan 10/320 one tablet daily.  I cannot get this back on his med list, in regards to the dose.  Of note, he is going to call tomorrow in regards to whether he is currently on this at home as well as doxazosin.

## 2023-10-30 NOTE — ASSESSMENT & PLAN NOTE
Patient underwent left total knee replacement on 10/23/2023, was monitored overnight, discharged the following day.  His hemoglobin was down about 1 g postop, he was 11.2 at time of discharge.  He is currently going to outpatient physical therapy, as expected swelling postoperatively, dressing still intact, it was changed just recently by the nurse, no report of any cellulitis.    DVT prophylaxis continues with Eliquis, negative cord, negative Homans, to switch to full dose aspirin in a few days.    H&P/discharge summary reviewed, please see individualized headings for further detail.

## 2023-10-30 NOTE — PATIENT INSTRUCTIONS
1.  Please check to ensure you are on doxazosin 2 mg one daily.    2.  Also let me know if you are still taking amlodipine/valsartan 10/320 mg one daily.

## 2023-10-30 NOTE — ASSESSMENT & PLAN NOTE
Patient hemoglobin was only down 1 g postop, but he is now being seen about a week postop, and he is feeling weaker.  We will check iron studies today as well as renal panel, B12, TSH etc.

## 2023-10-30 NOTE — ASSESSMENT & PLAN NOTE
Blood pressure is stable as of his 10/23 hospital follow-up.  Medication on his current list was the metoprolol, I added the Cardura to his list, he is checking on amlodipine/valsartan.  We believe he is still on all of these agents.  Hemoglobin was just down slightly, and blood pressure appears stable.  Current regimen.

## 2023-10-30 NOTE — PROGRESS NOTES
"Chief Complaint  Hospital Follow Up Visit (Left knee replacement. Pt states Dr Henry is his Ortho. Pain scale 4 , Pt is being scheduled 3x's per week. Pt is declining flu shot. )    Subjective          Marcio Enrique presents to [facility]     History of present illness:  73-year-old male with underlying hypertension, hyperlipidemia, diabetes, who is coming in 7/23 for routine 4-month follow-up.  We will go over his meds, review his labs in detail, and address any new concerns he may have.  ---> here 10/23 for hospital discharge follow-up:  S/P L TKR on 10/24/23    Review of Systems   Constitutional:  Negative for appetite change, fatigue and fever.   HENT:  Negative for congestion and ear pain.    Eyes:  Negative for blurred vision.   Respiratory:  Negative for cough, chest tightness, shortness of breath and wheezing.    Cardiovascular:  Negative for chest pain, palpitations and leg swelling.   Gastrointestinal:  Negative for abdominal pain.   Genitourinary:  Negative for difficulty urinating, dysuria and hematuria.   Musculoskeletal:  Negative for arthralgias and gait problem.   Skin:  Negative for skin lesions.   Neurological:  Negative for syncope, memory problem and confusion.   Psychiatric/Behavioral:  Negative for self-injury and depressed mood.        Objective   Vital Signs:   /80   Pulse 66   Temp 98 °F (36.7 °C)   Ht 195.6 cm (77.01\")   Wt 116 kg (255 lb)   SpO2 98%   BMI 30.23 kg/m²           Physical Exam  Vitals and nursing note reviewed.   Constitutional:       General: He is not in acute distress.     Appearance: Normal appearance. He is not toxic-appearing.   HENT:      Head: Atraumatic.      Right Ear: External ear normal.      Left Ear: External ear normal.      Nose: Nose normal.      Mouth/Throat:      Mouth: Mucous membranes are moist.   Eyes:      General:         Right eye: No discharge.         Left eye: No discharge.      Extraocular Movements: " Extraocular movements intact.      Pupils: Pupils are equal, round, and reactive to light.   Cardiovascular:      Rate and Rhythm: Normal rate and regular rhythm.      Pulses: Normal pulses.      Heart sounds: Normal heart sounds. No murmur heard.     No gallop.   Pulmonary:      Effort: Pulmonary effort is normal. No respiratory distress.      Breath sounds: No wheezing, rhonchi or rales.   Abdominal:      General: There is no distension.      Palpations: Abdomen is soft. There is no mass.      Tenderness: There is no abdominal tenderness. There is no guarding.   Musculoskeletal:         General: No swelling or tenderness.      Cervical back: No tenderness.      Right lower leg: No edema.      Left lower leg: No edema.   Skin:     General: Skin is warm and dry.      Findings: No rash.   Neurological:      General: No focal deficit present.      Mental Status: He is alert and oriented to person, place, and time. Mental status is at baseline.      Motor: No weakness.      Gait: Gait normal.   Psychiatric:         Mood and Affect: Mood normal.         Thought Content: Thought content normal.          Result Review :   The following data was reviewed by: Aleksandr Olmstead MD on 08/23/2021:                  Assessment and Plan    Diagnoses and all orders for this visit:    1. Hospital discharge follow-up (Primary)  Assessment & Plan:  Patient underwent left total knee replacement on 10/23/2023, was monitored overnight, discharged the following day.  His hemoglobin was down about 1 g postop, he was 11.2 at time of discharge.  He is currently going to outpatient physical therapy, as expected swelling postoperatively, dressing still intact, it was changed just recently by the nurse, no report of any cellulitis.    DVT prophylaxis continues with Eliquis, negative cord, negative Homans, to switch to full dose aspirin in a few days.    H&P/discharge summary reviewed, please see individualized headings for further detail.      2.  Essential hypertension  Assessment & Plan:  Blood pressure is stable as of his 10/23 hospital follow-up.  Medication on his current list was the metoprolol, I added the Cardura to his list, he is checking on amlodipine/valsartan.  We believe he is still on all of these agents.  Hemoglobin was just down slightly, and blood pressure appears stable.  Current regimen.    Orders:  -     Renal Function Panel; Future    3. Iron deficiency anemia secondary to inadequate dietary iron intake  Assessment & Plan:  Patient hemoglobin was only down 1 g postop, but he is now being seen about a week postop, and he is feeling weaker.  We will check iron studies today as well as renal panel, B12, TSH etc.    Orders:  -     Ferritin; Future  -     CBC & Differential; Future  -     Iron Profile; Future    4. Other fatigue  -     Vitamin B12 anemia; Future  -     Folate anemia; Future  -     TSH; Future  -     T4, free; Future             --  --  Older notes:  VISIT 4/21:   ANNUAL MEDICARE WELLNESS EXAM 12/20 = reviewed all forms with pt; no new discoveries.  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  --  DM stable on only 500 qd and that is fine...6.3...6.9 and I d/w bad trend...6.9 is holding on 500 qd only... 7.4 and rec 1000 qd again...7.0...7.4 is due to the steroids, and they are being weaned, so no med changes made 9/19...6.9 is back down off steroids...7.5 and will increase on RTO if same/higher = only on 500 qd recall...6.9 on 1000 qAM as of 8/20 OV---> 7.1 is fine 4/21. (TSH neg 4/21).  --  LIPIDS stable 3/14 w/o treatment...elevated CK without statins anyhow...defer... holding steady...122  and will add statin now...78, but CK up and c/o joints at 5/18 OV, so will hold Pravastatin 10 mg for now...86 is fine...96=ok for now---> 84 in 4/20.  --  HTN with need for checks at home before increase Exforge to max dose as of 2/18 OV... BP stable off HCTZ and on toprol...required increase exforge per Karyn; BP great 4/19 OV despite back on Naproxen past week---> stable 4/21.  --  SYNCOPE 2/18 and to ER for 5 hrs; had neg V/Q; did have aura, so most c/w vasovagal; exam neg; will get ECHO/Dopplers/tilt to complete w/u...ECHO is as below, the Carotids 3/18 were neg, and TILT + for NEUROCARDIOGENIC SYNCOPE=will try to lose HCTZ and add in low dose of toprol; pt to monitor BP and call with results; d/w keep hydrated/heed warning signs...c/o dizziness with rapid head mvmt and rec eval per ENT now...got better on it's own.  ASCENDING AORTIC DILATATION=3.8 cm per ECHO 3/18 (d/w nl 3.5 or less, dilated 3.5 to 4.5, aneurysmal is above 4.4, and surgery above 5.5).  --  DJD with L knee/shoulder requiring MRI's...increased sxs 5/18, so holding statin...c/o knee again 12/18, but doesn't sound severe; OTC meds for now...I was unaware of bump in BP, so I started Naprosyn back a week ago...on prednisone as of 6/19 OV with Dr Mcgovern following now...ESR down 86 to 9 on prednisone as of 9/19 OV...still being weaned as of 12/19 OV and I d/w add tylenol to help offset the prednisone wean.  --  H PYLORI +...d/w tx plan with prilosec 20 mg/amoxil 1000mg/biaxin 500 mg all bid for 14 days...tolerated this.  GERD/BURPPING was better when he was on PPI for above, so try qod...no c/o 10/17.  GALLSTONES noted per below eval and will eval if persistent.  R ABD PAIN to ER, was nauseated, CT=? liver cysts and L renal lesion, he's concerned, so will get MRI 2/16... MRI 3/17 is with stable cysts.   --  BPH with intol to cardura prior, slow stream but no other sx's...defer for now.   --  --  PSA 2.2 (3/14/2023)  COLON 8/22 = Cologuard  negative  PNEUMOVAX #1 12/13, Prevnar '16; rec Hep A 5/18.  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    Follow Up   Return for Next scheduled follow up.  Patient was given instructions and counseling regarding his condition or for health maintenance advice. Please see specific information pulled into the AVS if appropriate.

## 2023-10-31 ENCOUNTER — TELEPHONE (OUTPATIENT)
Dept: INTERNAL MEDICINE | Facility: CLINIC | Age: 73
End: 2023-10-31
Payer: MEDICARE

## 2023-10-31 DIAGNOSIS — T45.4X5A ADVERSE EFFECT OF IRON, INITIAL ENCOUNTER: ICD-10-CM

## 2023-10-31 DIAGNOSIS — D50.8 IRON DEFICIENCY ANEMIA SECONDARY TO INADEQUATE DIETARY IRON INTAKE: Primary | ICD-10-CM

## 2023-10-31 LAB
ALBUMIN SERPL-MCNC: 3.7 G/DL (ref 3.5–5.2)
ANION GAP SERPL CALCULATED.3IONS-SCNC: 10.6 MMOL/L (ref 5–15)
BUN SERPL-MCNC: 22 MG/DL (ref 8–23)
BUN/CREAT SERPL: 15.8 (ref 7–25)
CALCIUM SPEC-SCNC: 9.6 MG/DL (ref 8.6–10.5)
CHLORIDE SERPL-SCNC: 105 MMOL/L (ref 98–107)
CO2 SERPL-SCNC: 23.4 MMOL/L (ref 22–29)
CREAT SERPL-MCNC: 1.39 MG/DL (ref 0.76–1.27)
EGFRCR SERPLBLD CKD-EPI 2021: 52.9 ML/MIN/1.73
FERRITIN SERPL-MCNC: 407 NG/ML (ref 30–400)
GLUCOSE SERPL-MCNC: 122 MG/DL (ref 65–99)
IRON 24H UR-MRATE: 32 MCG/DL (ref 59–158)
IRON SATN MFR SERPL: 12 % (ref 20–50)
PHOSPHATE SERPL-MCNC: 3.1 MG/DL (ref 2.5–4.5)
POTASSIUM SERPL-SCNC: 4.8 MMOL/L (ref 3.5–5.2)
SODIUM SERPL-SCNC: 139 MMOL/L (ref 136–145)
T4 FREE SERPL-MCNC: 1.21 NG/DL (ref 0.93–1.7)
TIBC SERPL-MCNC: 259 MCG/DL (ref 298–536)
TRANSFERRIN SERPL-MCNC: 174 MG/DL (ref 200–360)
TSH SERPL DL<=0.05 MIU/L-ACNC: 1.77 UIU/ML (ref 0.27–4.2)

## 2023-10-31 RX ORDER — SODIUM CHLORIDE 9 MG/ML
250 INJECTION, SOLUTION INTRAVENOUS ONCE
OUTPATIENT
Start: 2023-10-31

## 2023-10-31 RX ORDER — ACETAMINOPHEN 325 MG/1
650 TABLET ORAL ONCE
OUTPATIENT
Start: 2023-10-31

## 2023-10-31 RX ORDER — DIPHENHYDRAMINE HYDROCHLORIDE 50 MG/ML
50 INJECTION INTRAMUSCULAR; INTRAVENOUS AS NEEDED
OUTPATIENT
Start: 2023-10-31

## 2023-10-31 RX ORDER — FAMOTIDINE 10 MG/ML
20 INJECTION, SOLUTION INTRAVENOUS AS NEEDED
OUTPATIENT
Start: 2023-10-31

## 2023-10-31 RX ORDER — DIPHENHYDRAMINE HCL 25 MG
25 CAPSULE ORAL ONCE
OUTPATIENT
Start: 2023-10-31

## 2023-10-31 RX ORDER — FAMOTIDINE 10 MG/ML
20 INJECTION, SOLUTION INTRAVENOUS ONCE
OUTPATIENT
Start: 2023-10-31

## 2023-10-31 RX ORDER — FAMOTIDINE 20 MG/1
20 TABLET, FILM COATED ORAL ONCE
OUTPATIENT
Start: 2023-10-31

## 2023-10-31 RX ORDER — DIPHENHYDRAMINE HYDROCHLORIDE 50 MG/ML
25 INJECTION INTRAMUSCULAR; INTRAVENOUS ONCE
OUTPATIENT
Start: 2023-10-31

## 2023-10-31 NOTE — TELEPHONE ENCOUNTER
----- Message from Aleksandr Olmstead MD sent at 10/30/2023 11:38 PM EDT -----  Patient needs IV iron x2 due to constipation and post-op anemia.

## 2023-11-03 RX ORDER — OMEPRAZOLE 40 MG/1
40 CAPSULE, DELAYED RELEASE ORAL DAILY
COMMUNITY
Start: 2023-10-25

## 2023-11-03 RX ORDER — CETIRIZINE HYDROCHLORIDE 10 MG/1
10 TABLET ORAL DAILY
COMMUNITY

## 2023-11-03 RX ORDER — FERROUS SULFATE 325(65) MG
325 TABLET ORAL
COMMUNITY

## 2023-11-03 RX ORDER — AMLODIPINE AND VALSARTAN 10; 320 MG/1; MG/1
1 TABLET ORAL DAILY
COMMUNITY

## 2023-11-03 RX ORDER — TRAZODONE HYDROCHLORIDE 50 MG/1
100 TABLET ORAL NIGHTLY
COMMUNITY

## 2023-11-03 RX ORDER — MULTIPLE VITAMINS W/ MINERALS TAB 9MG-400MCG
1 TAB ORAL DAILY
COMMUNITY

## 2023-11-03 RX ORDER — METFORMIN HYDROCHLORIDE 500 MG/1
1000 TABLET, EXTENDED RELEASE ORAL
COMMUNITY

## 2023-11-03 NOTE — PROGRESS NOTES
I will put Amlodipine/Valsartan 10/320 back on his list for qd, he has that at home and has been taking it. He currently does not have Doxazosin at home at this time.

## 2023-11-06 ENCOUNTER — OFFICE VISIT (OUTPATIENT)
Dept: ORTHOPEDIC SURGERY | Facility: CLINIC | Age: 73
End: 2023-11-06
Payer: MEDICARE

## 2023-11-06 VITALS — WEIGHT: 255 LBS | HEIGHT: 77 IN | BODY MASS INDEX: 30.11 KG/M2

## 2023-11-06 DIAGNOSIS — Z47.1 AFTERCARE FOLLOWING LEFT KNEE JOINT REPLACEMENT SURGERY: Primary | ICD-10-CM

## 2023-11-06 DIAGNOSIS — Z96.652 AFTERCARE FOLLOWING LEFT KNEE JOINT REPLACEMENT SURGERY: Primary | ICD-10-CM

## 2023-11-06 PROCEDURE — 1159F MED LIST DOCD IN RCRD: CPT | Performed by: PHYSICIAN ASSISTANT

## 2023-11-06 PROCEDURE — 1160F RVW MEDS BY RX/DR IN RCRD: CPT | Performed by: PHYSICIAN ASSISTANT

## 2023-11-06 PROCEDURE — 99024 POSTOP FOLLOW-UP VISIT: CPT | Performed by: PHYSICIAN ASSISTANT

## 2023-11-06 NOTE — PROGRESS NOTES
"Chief Complaint  Follow-up and Pain of the Left Knee    Subjective      Marcio Enrique presents to [facility] for follow-up of left total knee arthroplasty with Flor durbin performed on 10/24/2023 by Dr. Figueroa.  Patient presents today with use of walker.  He reports minimal pain, however, a \"stinging sensation\" to his knee.  He is participating in physical therapy through [facility] in [place].    Objective   No Known Allergies    Vital Signs:   Ht 195.6 cm (77\")   Wt 116 kg (255 lb)   BMI 30.24 kg/m²       Physical Exam    Constitutional: Awake, alert. Well nourished appearance.    Integumentary: Warm, dry, intact. No obvious rashes.    HENT: Atraumatic, normocephalic.   Respiratory: Non labored respirations .   Cardiovascular: Intact peripheral pulses.    Psychiatric: Normal mood and affect. A&O X3    Ortho Exam  Left knee: Skin is warm, dry, and intact.  Staples removed in office.  Well-healing surgical incision visualized.  There is moderate edema.  Patella is well tracking and knee is stable to varus and valgus stress.  -3 degrees of knee extension and flexion to 95 degrees.  Full plantarflexion and dorsiflexion of the ankle.  Sensation intact light touch.  Distal neurovascular intact.  Patient ambulatory with assistance of a walker.    Imaging Results (Most Recent)       Procedure Component Value Units Date/Time    XR Knee 3 View Left [[id number]] Resulted: 11/07/23 0817     Updated: 11/07/23 0817    Narrative:      X-Ray Report:  Study: X-rays ordered, taken in the office, and reviewed today.   Site: Left knee Xray  Indication: TKA  View: AP/Lateral, Standing, and [place] view(s)  Findings: Intact left total knee arthroplasty. No evidence of hardware   malfunction.   Prior studies available for comparison: yes                       Assessment and Plan   Problem List Items Addressed This Visit    None  Visit Diagnoses       Aftercare following left knee joint replacement surgery    -  " Primary    Relevant Orders    XR Knee 3 View Left (Completed)          Follow Up   Return in about 4 weeks (around 12/4/2023).    Tobacco Use: Low Risk  (11/6/2023)    Patient History     Smoking Tobacco Use: Never     Smokeless Tobacco Use: Never     Passive Exposure: Not on file     Patient is a non-smoker.  Did not discuss tobacco cessation options.    Patient Instructions   X-rays taken and reviewed, showing intact hardware.    Staples removed in office and Steri-Strips applied.  Patient educated on incision care.  Please keep incision clean and dry.  Do not soak or submerge in water until incision is fully healed.  Do not apply creams or lotions over the incision.  Please allow Steri-Strips to fall off on their own within 7 to 10 days.    Continue icing and elevation of the knee as needed to help with pain and swelling.  Ice knee up to 3 or 4 times daily for no longer than 15 to 20 minutes at a time.    Continue PT to progress ROM, strength, and weightbearing status.    Follow-up in 4 weeks. Repeat x-rays not needed at this visit.  Please call with questions or concerns.    Patient was given instructions and counseling regarding his condition or for health maintenance advice. Please see specific information pulled into the AVS if appropriate.

## 2023-11-10 ENCOUNTER — HOSPITAL ENCOUNTER (OUTPATIENT)
Dept: INFUSION THERAPY | Facility: HOSPITAL | Age: 73
Discharge: HOME | End: 2023-11-10
Payer: MEDICARE

## 2023-11-10 VITALS
OXYGEN SATURATION: 99 % | HEIGHT: 77 IN | WEIGHT: 248.9 LBS | BODY MASS INDEX: 29.39 KG/M2 | HEART RATE: 59 BPM | DIASTOLIC BLOOD PRESSURE: 71 MMHG | SYSTOLIC BLOOD PRESSURE: 131 MMHG | RESPIRATION RATE: 20 BRPM | TEMPERATURE: 97.5 F

## 2023-11-10 DIAGNOSIS — D50.8 IRON DEFICIENCY ANEMIA SECONDARY TO INADEQUATE DIETARY IRON INTAKE: Primary | ICD-10-CM

## 2023-11-10 PROCEDURE — 96365 THER/PROPH/DIAG IV INF INIT: CPT

## 2023-11-10 PROCEDURE — 25010000002 IRON SUCROSE PER 1 MG: Performed by: INTERNAL MEDICINE

## 2023-11-10 RX ORDER — SODIUM CHLORIDE 9 MG/ML
250 INJECTION, SOLUTION INTRAVENOUS ONCE
OUTPATIENT
Start: 2023-11-20

## 2023-11-10 RX ORDER — SODIUM CHLORIDE 9 MG/ML
250 INJECTION, SOLUTION INTRAVENOUS ONCE
Status: DISCONTINUED | OUTPATIENT
Start: 2023-11-10 | End: 2023-11-12 | Stop reason: HOSPADM

## 2023-11-10 RX ORDER — DIPHENHYDRAMINE HCL 25 MG
25 CAPSULE ORAL ONCE
OUTPATIENT
Start: 2023-11-20

## 2023-11-10 RX ORDER — DIPHENHYDRAMINE HYDROCHLORIDE 50 MG/ML
50 INJECTION INTRAMUSCULAR; INTRAVENOUS AS NEEDED
OUTPATIENT
Start: 2023-11-20

## 2023-11-10 RX ORDER — DIPHENHYDRAMINE HCL 25 MG
25 CAPSULE ORAL ONCE
Status: DISCONTINUED | OUTPATIENT
Start: 2023-11-10 | End: 2023-11-12 | Stop reason: HOSPADM

## 2023-11-10 RX ORDER — DIPHENHYDRAMINE HYDROCHLORIDE 50 MG/ML
25 INJECTION INTRAMUSCULAR; INTRAVENOUS ONCE
Status: DISCONTINUED | OUTPATIENT
Start: 2023-11-10 | End: 2023-11-12 | Stop reason: HOSPADM

## 2023-11-10 RX ORDER — DIPHENHYDRAMINE HYDROCHLORIDE 50 MG/ML
25 INJECTION INTRAMUSCULAR; INTRAVENOUS ONCE
OUTPATIENT
Start: 2023-11-20

## 2023-11-10 RX ORDER — DOXAZOSIN 2 MG/1
2 TABLET ORAL NIGHTLY
Qty: 90 TABLET | Refills: 1 | Status: SHIPPED | OUTPATIENT
Start: 2023-11-10

## 2023-11-10 RX ORDER — FAMOTIDINE 10 MG/ML
20 INJECTION, SOLUTION INTRAVENOUS AS NEEDED
OUTPATIENT
Start: 2023-11-20

## 2023-11-10 RX ORDER — FAMOTIDINE 10 MG/ML
20 INJECTION, SOLUTION INTRAVENOUS ONCE
OUTPATIENT
Start: 2023-11-20

## 2023-11-10 RX ORDER — FAMOTIDINE 20 MG/1
20 TABLET, FILM COATED ORAL ONCE
Status: DISCONTINUED | OUTPATIENT
Start: 2023-11-10 | End: 2023-11-12 | Stop reason: HOSPADM

## 2023-11-10 RX ORDER — FAMOTIDINE 20 MG/1
20 TABLET, FILM COATED ORAL ONCE
OUTPATIENT
Start: 2023-11-20

## 2023-11-10 RX ORDER — FAMOTIDINE 10 MG/ML
20 INJECTION, SOLUTION INTRAVENOUS ONCE
Status: DISCONTINUED | OUTPATIENT
Start: 2023-11-10 | End: 2023-11-12 | Stop reason: HOSPADM

## 2023-11-10 RX ORDER — ACETAMINOPHEN 325 MG/1
650 TABLET ORAL ONCE
Status: DISCONTINUED | OUTPATIENT
Start: 2023-11-10 | End: 2023-11-12 | Stop reason: HOSPADM

## 2023-11-10 RX ORDER — ACETAMINOPHEN 325 MG/1
650 TABLET ORAL ONCE
OUTPATIENT
Start: 2023-11-20

## 2023-11-10 RX ADMIN — IRON SUCROSE 200 MG: 20 INJECTION, SOLUTION INTRAVENOUS at 09:16

## 2023-11-10 NOTE — TELEPHONE ENCOUNTER
Rx Refill Note  Requested Prescriptions      No prescriptions requested or ordered in this encounter      Last office visit with prescribing clinician: 10/30/2023   Last telemedicine visit with prescribing clinician: Visit date not found   Next office visit with prescribing clinician: 11/28/2023                         Would you like a call back once the refill request has been completed: [] Yes [] No    If the office needs to give you a call back, can they leave a voicemail: [] Yes [] No    Betty Rivera MA  11/10/23, 10:31 EST

## 2023-11-20 ENCOUNTER — HOSPITAL ENCOUNTER (OUTPATIENT)
Dept: INFUSION THERAPY | Facility: HOSPITAL | Age: 73
Discharge: HOME | End: 2023-11-20
Admitting: INTERNAL MEDICINE
Payer: MEDICARE

## 2023-11-20 VITALS
DIASTOLIC BLOOD PRESSURE: 82 MMHG | SYSTOLIC BLOOD PRESSURE: 138 MMHG | HEIGHT: 77 IN | BODY MASS INDEX: 30.14 KG/M2 | OXYGEN SATURATION: 100 % | RESPIRATION RATE: 20 BRPM | WEIGHT: 255.29 LBS | TEMPERATURE: 97.9 F | HEART RATE: 54 BPM

## 2023-11-20 DIAGNOSIS — D50.8 IRON DEFICIENCY ANEMIA SECONDARY TO INADEQUATE DIETARY IRON INTAKE: Primary | ICD-10-CM

## 2023-11-20 PROCEDURE — 96374 THER/PROPH/DIAG INJ IV PUSH: CPT

## 2023-11-20 PROCEDURE — 25010000002 IRON SUCROSE PER 1 MG: Performed by: INTERNAL MEDICINE

## 2023-11-20 RX ORDER — FAMOTIDINE 10 MG/ML
20 INJECTION, SOLUTION INTRAVENOUS AS NEEDED
OUTPATIENT
Start: 2023-11-30

## 2023-11-20 RX ORDER — FAMOTIDINE 10 MG/ML
20 INJECTION, SOLUTION INTRAVENOUS ONCE
OUTPATIENT
Start: 2023-11-30

## 2023-11-20 RX ORDER — FAMOTIDINE 20 MG/1
20 TABLET, FILM COATED ORAL ONCE
Status: CANCELLED | OUTPATIENT
Start: 2023-11-30

## 2023-11-20 RX ORDER — DIPHENHYDRAMINE HCL 25 MG
25 CAPSULE ORAL ONCE
Status: DISCONTINUED | OUTPATIENT
Start: 2023-11-20 | End: 2023-11-22 | Stop reason: HOSPADM

## 2023-11-20 RX ORDER — DIPHENHYDRAMINE HCL 25 MG
25 CAPSULE ORAL ONCE
Status: CANCELLED | OUTPATIENT
Start: 2023-11-30

## 2023-11-20 RX ORDER — SODIUM CHLORIDE 9 MG/ML
250 INJECTION, SOLUTION INTRAVENOUS ONCE
OUTPATIENT
Start: 2023-11-30

## 2023-11-20 RX ORDER — ACETAMINOPHEN 325 MG/1
650 TABLET ORAL ONCE
Status: DISCONTINUED | OUTPATIENT
Start: 2023-11-20 | End: 2023-11-22 | Stop reason: HOSPADM

## 2023-11-20 RX ORDER — ACETAMINOPHEN 325 MG/1
650 TABLET ORAL ONCE
Status: CANCELLED | OUTPATIENT
Start: 2023-11-30

## 2023-11-20 RX ORDER — DIPHENHYDRAMINE HYDROCHLORIDE 50 MG/ML
25 INJECTION INTRAMUSCULAR; INTRAVENOUS ONCE
OUTPATIENT
Start: 2023-11-30

## 2023-11-20 RX ORDER — FAMOTIDINE 20 MG/1
20 TABLET, FILM COATED ORAL ONCE
Status: DISCONTINUED | OUTPATIENT
Start: 2023-11-20 | End: 2023-11-22 | Stop reason: HOSPADM

## 2023-11-20 RX ORDER — DIPHENHYDRAMINE HYDROCHLORIDE 50 MG/ML
50 INJECTION INTRAMUSCULAR; INTRAVENOUS AS NEEDED
OUTPATIENT
Start: 2023-11-30

## 2023-11-20 RX ADMIN — IRON SUCROSE 200 MG: 20 INJECTION, SOLUTION INTRAVENOUS at 10:54

## 2023-11-27 ENCOUNTER — LAB (OUTPATIENT)
Dept: INTERNAL MEDICINE | Facility: CLINIC | Age: 73
End: 2023-11-27
Payer: MEDICARE

## 2023-11-27 DIAGNOSIS — E11.9 TYPE 2 DIABETES MELLITUS WITHOUT COMPLICATION, WITHOUT LONG-TERM CURRENT USE OF INSULIN: ICD-10-CM

## 2023-11-27 DIAGNOSIS — D50.8 IRON DEFICIENCY ANEMIA SECONDARY TO INADEQUATE DIETARY IRON INTAKE: ICD-10-CM

## 2023-11-27 DIAGNOSIS — I10 ESSENTIAL HYPERTENSION: ICD-10-CM

## 2023-11-27 DIAGNOSIS — E78.2 MIXED HYPERLIPIDEMIA: ICD-10-CM

## 2023-11-27 LAB
ALBUMIN SERPL-MCNC: 4.1 G/DL (ref 3.5–5.2)
ALBUMIN/GLOB SERPL: 1.6 G/DL
ALP SERPL-CCNC: 87 U/L (ref 39–117)
ALT SERPL W P-5'-P-CCNC: 12 U/L (ref 1–41)
ANION GAP SERPL CALCULATED.3IONS-SCNC: 11 MMOL/L (ref 5–15)
AST SERPL-CCNC: 19 U/L (ref 1–40)
BILIRUB SERPL-MCNC: 0.2 MG/DL (ref 0–1.2)
BUN SERPL-MCNC: 17 MG/DL (ref 8–23)
BUN/CREAT SERPL: 15 (ref 7–25)
CALCIUM SPEC-SCNC: 9.8 MG/DL (ref 8.6–10.5)
CHLORIDE SERPL-SCNC: 106 MMOL/L (ref 98–107)
CHOLEST SERPL-MCNC: 130 MG/DL (ref 0–200)
CO2 SERPL-SCNC: 26 MMOL/L (ref 22–29)
CREAT SERPL-MCNC: 1.13 MG/DL (ref 0.76–1.27)
EGFRCR SERPLBLD CKD-EPI 2021: 67.8 ML/MIN/1.73
FERRITIN SERPL-MCNC: 524 NG/ML (ref 30–400)
GLOBULIN UR ELPH-MCNC: 2.5 GM/DL
GLUCOSE SERPL-MCNC: 129 MG/DL (ref 65–99)
HBA1C MFR BLD: 6.7 % (ref 4.8–5.6)
HDLC SERPL-MCNC: 47 MG/DL (ref 40–60)
IRON 24H UR-MRATE: 66 MCG/DL (ref 59–158)
IRON SATN MFR SERPL: 21 % (ref 20–50)
LDLC SERPL CALC-MCNC: 69 MG/DL (ref 0–100)
LDLC/HDLC SERPL: 1.48 {RATIO}
POTASSIUM SERPL-SCNC: 5 MMOL/L (ref 3.5–5.2)
PROT SERPL-MCNC: 6.6 G/DL (ref 6–8.5)
SODIUM SERPL-SCNC: 143 MMOL/L (ref 136–145)
TIBC SERPL-MCNC: 316 MCG/DL (ref 298–536)
TRANSFERRIN SERPL-MCNC: 212 MG/DL (ref 200–360)
TRIGL SERPL-MCNC: 68 MG/DL (ref 0–150)
VLDLC SERPL-MCNC: 14 MG/DL (ref 5–40)

## 2023-11-27 PROCEDURE — 85025 COMPLETE CBC W/AUTO DIFF WBC: CPT | Performed by: INTERNAL MEDICINE

## 2023-11-27 PROCEDURE — 80061 LIPID PANEL: CPT | Performed by: INTERNAL MEDICINE

## 2023-11-27 PROCEDURE — 83036 HEMOGLOBIN GLYCOSYLATED A1C: CPT | Performed by: INTERNAL MEDICINE

## 2023-11-27 PROCEDURE — 84466 ASSAY OF TRANSFERRIN: CPT | Performed by: INTERNAL MEDICINE

## 2023-11-27 PROCEDURE — 36415 COLL VENOUS BLD VENIPUNCTURE: CPT | Performed by: INTERNAL MEDICINE

## 2023-11-27 PROCEDURE — 80053 COMPREHEN METABOLIC PANEL: CPT | Performed by: INTERNAL MEDICINE

## 2023-11-27 PROCEDURE — 83540 ASSAY OF IRON: CPT | Performed by: INTERNAL MEDICINE

## 2023-11-27 PROCEDURE — 82728 ASSAY OF FERRITIN: CPT | Performed by: INTERNAL MEDICINE

## 2023-11-28 ENCOUNTER — OFFICE VISIT (OUTPATIENT)
Dept: INTERNAL MEDICINE | Facility: CLINIC | Age: 73
End: 2023-11-28
Payer: MEDICARE

## 2023-11-28 VITALS
OXYGEN SATURATION: 96 % | RESPIRATION RATE: 18 BRPM | WEIGHT: 256.4 LBS | HEART RATE: 55 BPM | HEIGHT: 77 IN | SYSTOLIC BLOOD PRESSURE: 132 MMHG | DIASTOLIC BLOOD PRESSURE: 74 MMHG | BODY MASS INDEX: 30.27 KG/M2 | TEMPERATURE: 98.2 F

## 2023-11-28 DIAGNOSIS — E11.9 TYPE 2 DIABETES MELLITUS WITHOUT COMPLICATION, WITHOUT LONG-TERM CURRENT USE OF INSULIN: Primary | ICD-10-CM

## 2023-11-28 DIAGNOSIS — D50.8 IRON DEFICIENCY ANEMIA SECONDARY TO INADEQUATE DIETARY IRON INTAKE: ICD-10-CM

## 2023-11-28 DIAGNOSIS — Z12.5 PROSTATE CANCER SCREENING: ICD-10-CM

## 2023-11-28 DIAGNOSIS — E78.2 MIXED HYPERLIPIDEMIA: ICD-10-CM

## 2023-11-28 DIAGNOSIS — Z96.652 S/P TKR (TOTAL KNEE REPLACEMENT), LEFT: ICD-10-CM

## 2023-11-28 DIAGNOSIS — I10 ESSENTIAL HYPERTENSION: ICD-10-CM

## 2023-11-28 PROBLEM — Z09 HOSPITAL DISCHARGE FOLLOW-UP: Status: RESOLVED | Noted: 2023-10-30 | Resolved: 2023-11-28

## 2023-11-28 PROBLEM — M17.12 PRIMARY OSTEOARTHRITIS OF LEFT KNEE: Status: RESOLVED | Noted: 2023-10-02 | Resolved: 2023-11-28

## 2023-11-28 PROBLEM — M17.12 OSTEOARTHRITIS OF LEFT KNEE: Status: RESOLVED | Noted: 2023-10-02 | Resolved: 2023-11-28

## 2023-11-28 LAB
BASOPHILS # BLD AUTO: 0.02 10*3/MM3 (ref 0–0.2)
BASOPHILS NFR BLD AUTO: 0.4 % (ref 0–1.5)
DEPRECATED RDW RBC AUTO: 44.2 FL (ref 37–54)
EOSINOPHIL # BLD AUTO: 0.26 10*3/MM3 (ref 0–0.4)
EOSINOPHIL NFR BLD AUTO: 5.2 % (ref 0.3–6.2)
ERYTHROCYTE [DISTWIDTH] IN BLOOD BY AUTOMATED COUNT: 13.2 % (ref 12.3–15.4)
HCT VFR BLD AUTO: 35.6 % (ref 37.5–51)
HGB BLD-MCNC: 11.4 G/DL (ref 13–17.7)
IMM GRANULOCYTES # BLD AUTO: 0.01 10*3/MM3 (ref 0–0.05)
IMM GRANULOCYTES NFR BLD AUTO: 0.2 % (ref 0–0.5)
LYMPHOCYTES # BLD AUTO: 1.67 10*3/MM3 (ref 0.7–3.1)
LYMPHOCYTES NFR BLD AUTO: 33.5 % (ref 19.6–45.3)
MCH RBC QN AUTO: 29.5 PG (ref 26.6–33)
MCHC RBC AUTO-ENTMCNC: 32 G/DL (ref 31.5–35.7)
MCV RBC AUTO: 92.2 FL (ref 79–97)
MONOCYTES # BLD AUTO: 0.55 10*3/MM3 (ref 0.1–0.9)
MONOCYTES NFR BLD AUTO: 11 % (ref 5–12)
NEUTROPHILS NFR BLD AUTO: 2.48 10*3/MM3 (ref 1.7–7)
NEUTROPHILS NFR BLD AUTO: 49.7 % (ref 42.7–76)
NRBC BLD AUTO-RTO: 0 /100 WBC (ref 0–0.2)
PLATELET # BLD AUTO: 248 10*3/MM3 (ref 140–450)
PMV BLD AUTO: 10.4 FL (ref 6–12)
RBC # BLD AUTO: 3.86 10*6/MM3 (ref 4.14–5.8)
WBC NRBC COR # BLD AUTO: 4.99 10*3/MM3 (ref 3.4–10.8)

## 2023-11-28 NOTE — PATIENT INSTRUCTIONS
Metformin, amlodipine/valsartan, metoprolol, omeprazole, and sertraline should be taken in the morning.    Pravastatin, doxazosin, and cetirizine should be taken in the evening.    If desired, trazodone can be taken at night as needed for sleep.

## 2023-11-28 NOTE — PROGRESS NOTES
"Chief Complaint  Diabetes (Patient is here for 4 month follow up, lab work follow up. Patient has no concerns at this time )    Subjective          Marcio Enrique presents to [facility]     History of Present Illness:  73-year-old male with underlying hypertension, hyperlipidemia, diabetes, who is coming in 11/23 for routine 4-month follow-up.  We will go over his meds, review his labs in detail, and address any new concerns he may have.    Review of Systems   Constitutional:  Negative for appetite change, fatigue and fever.   HENT:  Negative for congestion and ear pain.    Eyes:  Negative for blurred vision.   Respiratory:  Negative for cough, chest tightness, shortness of breath and wheezing.    Cardiovascular:  Negative for chest pain, palpitations and leg swelling.   Gastrointestinal:  Negative for abdominal pain.   Genitourinary:  Negative for difficulty urinating, dysuria and hematuria.   Musculoskeletal:  Negative for arthralgias and gait problem.   Skin:  Negative for skin lesions.   Neurological:  Negative for syncope, memory problem and confusion.   Psychiatric/Behavioral:  Negative for self-injury and depressed mood.        Objective   Vital Signs:   /74 (BP Location: Right arm, Patient Position: Sitting, Cuff Size: Large Adult)   Pulse 55   Temp 98.2 °F (36.8 °C)   Resp 18   Ht 195.6 cm (77.01\")   Wt 116 kg (256 lb 6.4 oz)   SpO2 96%   BMI 30.40 kg/m²           Physical Exam  Vitals and nursing note reviewed.   Constitutional:       General: He is not in acute distress.     Appearance: Normal appearance. He is not toxic-appearing.   HENT:      Head: Atraumatic.      Right Ear: External ear normal.      Left Ear: External ear normal.      Nose: Nose normal.      Mouth/Throat:      Mouth: Mucous membranes are moist.   Eyes:      General:         Right eye: No discharge.         Left eye: No discharge.      Extraocular Movements: Extraocular movements intact. "      Pupils: Pupils are equal, round, and reactive to light.   Cardiovascular:      Rate and Rhythm: Normal rate and regular rhythm.      Pulses: Normal pulses.      Heart sounds: Normal heart sounds. No murmur heard.     No gallop.   Pulmonary:      Effort: Pulmonary effort is normal. No respiratory distress.      Breath sounds: No wheezing, rhonchi or rales.   Abdominal:      General: There is no distension.      Palpations: Abdomen is soft. There is no mass.      Tenderness: There is no abdominal tenderness. There is no guarding.   Musculoskeletal:         General: No swelling or tenderness.      Cervical back: No tenderness.      Right lower leg: No edema.      Left lower leg: No edema.   Skin:     General: Skin is warm and dry.      Findings: No rash.   Neurological:      General: No focal deficit present.      Mental Status: He is alert and oriented to person, place, and time. Mental status is at baseline.      Motor: No weakness.      Gait: Gait normal.   Psychiatric:         Mood and Affect: Mood normal.         Thought Content: Thought content normal.          Result Review :   The following data was reviewed by: Aleksandr Olmstead MD on 08/23/2021:                  Assessment and Plan    Diagnoses and all orders for this visit:    1. Type 2 diabetes mellitus without complication, without long-term current use of insulin (Primary)  Assessment & Plan:  A1c is very stable at 6.7 as of 11/23.  This is with just low-dose metformin on board, stable to continue same.    Orders:  -     Hemoglobin A1c; Future    2. Essential hypertension  Assessment & Plan:  Blood pressure stable and his pulse is in the mid 50s as of 11/23 office visit.  He stable to continue with low-dose doxazosin, full dose amlodipine/valsartan and low-dose metoprolol.    Orders:  -     Basic Metabolic Panel; Future    3. Mixed hyperlipidemia  Assessment & Plan:  LDL of 69 is at goal as of 11/23.  He is on just low to moderate dose pravastatin,  stable to continue same.      4. Iron deficiency anemia secondary to inadequate dietary iron intake  -     CBC & Differential; Future  -     CBC & Differential; Future  -     Iron Profile; Future  -     Ferritin; Future    5. S/P TKR (total knee replacement), left  Assessment & Plan:  Patient underwent left total knee replacement on 10/23/2023, was monitored overnight, discharged the following day.  His hemoglobin was down about 1 g postop, he was 11.2 at time of discharge.  He is currently going to outpatient physical therapy, as expected swelling postoperatively.  DVT prophylaxis continues with Eliquis, negative cord, negative Homans, to switch to full dose aspirin in a few days.    ---> Patient is continuing with physical therapy as of his 11/23 office visit, he is about a month out, he is doing the sessions 3 times a week.  He has weaned off of the narcotic and has completed Eliquis and full dose aspirin.  Hopefully will continue with unremarkable recovery.      6. Prostate cancer screening  -     PSA Screen; Future      --  --  Older notes:  VISIT 4/21:   ANNUAL MEDICARE WELLNESS EXAM 12/20 = reviewed all forms with pt; no new discoveries.  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  --  DM stable on only 500 qd and that is fine...6.3...6.9 and I d/w bad trend...6.9 is holding on 500 qd only... 7.4 and rec 1000 qd again...7.0...7.4 is due to the steroids, and they are being weaned, so no med changes made 9/19...6.9 is back down off steroids...7.5 and will increase on RTO if same/higher = only on 500 qd recall...6.9 on 1000 qAM as of 8/20 OV---> 7.1 is fine 4/21. (TSH neg  4/21).  --  LIPIDS stable 3/14 w/o treatment...elevated CK without statins anyhow...defer... holding steady...122 and will add statin now...78, but CK up and c/o joints at 5/18 OV, so will hold Pravastatin 10 mg for now...86 is fine...96=ok for now---> 84 in 4/20.  --  HTN with need for checks at home before increase Exforge to max dose as of 2/18 OV... BP stable off HCTZ and on toprol...required increase exforge per Kayrn; BP great 4/19 OV despite back on Naproxen past week---> stable 4/21.  --  SYNCOPE 2/18 and to ER for 5 hrs; had neg V/Q; did have aura, so most c/w vasovagal; exam neg; will get ECHO/Dopplers/tilt to complete w/u...ECHO is as below, the Carotids 3/18 were neg, and TILT + for NEUROCARDIOGENIC SYNCOPE=will try to lose HCTZ and add in low dose of toprol; pt to monitor BP and call with results; d/w keep hydrated/heed warning signs...c/o dizziness with rapid head mvmt and rec eval per ENT now...got better on it's own.  ASCENDING AORTIC DILATATION=3.8 cm per ECHO 3/18 (d/w nl 3.5 or less, dilated 3.5 to 4.5, aneurysmal is above 4.4, and surgery above 5.5).  --  DJD with L knee/shoulder requiring MRI's...increased sxs 5/18, so holding statin...c/o knee again 12/18, but doesn't sound severe; OTC meds for now...I was unaware of bump in BP, so I started Naprosyn back a week ago...on prednisone as of 6/19 OV with Dr Mcgovern following now...ESR down 86 to 9 on prednisone as of 9/19 OV...still being weaned as of 12/19 OV and I d/w add tylenol to help offset the prednisone wean.  --  H PYLORI +...d/w tx plan with prilosec 20 mg/amoxil 1000mg/biaxin 500 mg all bid for 14 days...tolerated this.  GERD/BURPPING was better when he was on PPI for above, so try qod...no c/o 10/17.  GALLSTONES noted per below eval and will eval if persistent.  R ABD PAIN to ER, was nauseated, CT=? liver cysts and L renal lesion, he's concerned, so will get MRI 2/16... MRI 3/17 is with stable cysts.   --  BPH with intol to cardura  prior, slow stream but no other sx's...defer for now.   --  --  PSA 2.2 (3/14/2023)  COLON 8/22 = Cologuard negative  PNEUMOVAX #1 12/13, Prevnar '16; rec Hep A 5/18.  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    Follow Up   Return in about 4 months (around 3/28/2024).  Patient was given instructions and counseling regarding his condition or for health maintenance advice. Please see specific information pulled into the AVS if appropriate.

## 2023-11-28 NOTE — ASSESSMENT & PLAN NOTE
Patient underwent left total knee replacement on 10/23/2023, was monitored overnight, discharged the following day.  His hemoglobin was down about 1 g postop, he was 11.2 at time of discharge.  He is currently going to outpatient physical therapy, as expected swelling postoperatively.  DVT prophylaxis continues with Eliquis, negative cord, negative Homans, to switch to full dose aspirin in a few days.    ---> Patient is continuing with physical therapy as of his 11/23 office visit, he is about a month out, he is doing the sessions 3 times a week.  He has weaned off of the narcotic and has completed Eliquis and full dose aspirin.  Hopefully will continue with unremarkable recovery.

## 2023-11-28 NOTE — ASSESSMENT & PLAN NOTE
LDL of 69 is at goal as of 11/23.  He is on just low to moderate dose pravastatin, stable to continue same.

## 2023-11-28 NOTE — ASSESSMENT & PLAN NOTE
A1c is very stable at 6.7 as of 11/23.  This is with just low-dose metformin on board, stable to continue same.

## 2023-11-28 NOTE — ASSESSMENT & PLAN NOTE
Blood pressure stable and his pulse is in the mid 50s as of 11/23 office visit.  He stable to continue with low-dose doxazosin, full dose amlodipine/valsartan and low-dose metoprolol.

## 2023-12-05 RX ORDER — TRAZODONE HYDROCHLORIDE 50 MG/1
TABLET ORAL
Qty: 180 TABLET | Refills: 1 | Status: SHIPPED | OUTPATIENT
Start: 2023-12-05 | End: 2023-12-06 | Stop reason: SDUPTHER

## 2023-12-06 ENCOUNTER — TELEPHONE (OUTPATIENT)
Dept: INTERNAL MEDICINE | Facility: CLINIC | Age: 73
End: 2023-12-06
Payer: MEDICARE

## 2023-12-06 ENCOUNTER — OFFICE VISIT (OUTPATIENT)
Dept: ORTHOPEDIC SURGERY | Facility: CLINIC | Age: 73
End: 2023-12-06
Payer: MEDICARE

## 2023-12-06 VITALS — WEIGHT: 256 LBS | BODY MASS INDEX: 30.23 KG/M2 | HEIGHT: 77 IN

## 2023-12-06 DIAGNOSIS — Z47.1 AFTERCARE FOLLOWING LEFT KNEE JOINT REPLACEMENT SURGERY: Primary | ICD-10-CM

## 2023-12-06 DIAGNOSIS — Z96.652 AFTERCARE FOLLOWING LEFT KNEE JOINT REPLACEMENT SURGERY: Primary | ICD-10-CM

## 2023-12-06 RX ORDER — PRAVASTATIN SODIUM 20 MG
20 TABLET ORAL DAILY
Qty: 90 TABLET | Refills: 1 | Status: SHIPPED | OUTPATIENT
Start: 2023-12-06

## 2023-12-06 RX ORDER — OMEPRAZOLE 40 MG/1
40 CAPSULE, DELAYED RELEASE ORAL DAILY
Qty: 90 CAPSULE | Refills: 1 | Status: SHIPPED | OUTPATIENT
Start: 2023-12-06

## 2023-12-06 RX ORDER — CETIRIZINE HYDROCHLORIDE 10 MG/1
10 TABLET ORAL DAILY
Qty: 90 TABLET | Refills: 1 | Status: SHIPPED | OUTPATIENT
Start: 2023-12-06

## 2023-12-06 RX ORDER — DOXAZOSIN 2 MG/1
2 TABLET ORAL NIGHTLY
Qty: 90 TABLET | Refills: 1 | Status: SHIPPED | OUTPATIENT
Start: 2023-12-06

## 2023-12-06 RX ORDER — METFORMIN HYDROCHLORIDE 500 MG/1
1000 TABLET, EXTENDED RELEASE ORAL
Qty: 180 TABLET | Refills: 1 | Status: SHIPPED | OUTPATIENT
Start: 2023-12-06

## 2023-12-06 RX ORDER — TRAZODONE HYDROCHLORIDE 50 MG/1
100 TABLET ORAL NIGHTLY
Qty: 180 TABLET | Refills: 1 | Status: SHIPPED | OUTPATIENT
Start: 2023-12-06

## 2023-12-06 RX ORDER — METOPROLOL SUCCINATE 25 MG/1
12.5 TABLET, EXTENDED RELEASE ORAL DAILY
Qty: 45 TABLET | Refills: 1 | Status: SHIPPED | OUTPATIENT
Start: 2023-12-06

## 2023-12-06 RX ORDER — AMLODIPINE AND VALSARTAN 10; 320 MG/1; MG/1
1 TABLET ORAL DAILY
Qty: 90 TABLET | Refills: 1 | Status: SHIPPED | OUTPATIENT
Start: 2023-12-06

## 2023-12-06 NOTE — TELEPHONE ENCOUNTER
----- Message from Marcio Enrique sent at 2023  6:25 PM EST -----  Regardin day refills  Contact: [phone]  Dr Olmstead:  Could you please contact the pharmacist and when appropriate/possible; have all my prescriptions 90 day refills?

## 2023-12-06 NOTE — PROGRESS NOTES
"Chief Complaint  Follow-up and Pain of the Left Knee    Subjective      Marcio Enrique presents to [facility] for follow-up of left total knee arthroplasty with Flor robot performed on 10/20/2023 by Dr. PEREZ.  Patient presents today independently ambulatory without use of assistive device.  Reports that his knee is doing well.  He has remained in outpatient physical therapy.  PT reports fair progress with left knee passive range of motion -8 degrees extension to 105 degrees of flexion.    Objective   No Known Allergies    Vital Signs:   Ht 195.6 cm (77\")   Wt 116 kg (256 lb)   BMI 30.36 kg/m²       Physical Exam    Constitutional: Awake, alert. Well nourished appearance.    Integumentary: Warm, dry, intact. No obvious rashes.    HENT: Atraumatic, normocephalic.   Respiratory: Non labored respirations .   Cardiovascular: Intact peripheral pulses.    Psychiatric: Normal mood and affect. A&O X3    Ortho Exam  Left knee: Skin is warm, dry, and intact.  Well-healed surgical scar noted.  There is moderate edema.  Patella is well tracking and knee is stable to varus and valgus stress.  -8 degrees of extension and flexion to 105 degrees.  Full plantarflexion and dorsiflexion of the ankle.  Sensation and distal neurovascular intact.    Imaging Results (Most Recent)       None                      Assessment and Plan   Problem List Items Addressed This Visit    None  Visit Diagnoses       Aftercare following left knee joint replacement surgery    -  Primary            Follow Up   Return in about 6 weeks (around 1/17/2024).    Tobacco Use: Low Risk  (12/6/2023)    Patient History     Smoking Tobacco Use: Never     Smokeless Tobacco Use: Never     Passive Exposure: Not on file     Patient is a non-smoker.  Did not discuss tobacco cessation options.    Patient Instructions   Patient is doing very well, although has continued difficulties with knee extension. Order for L knee DynaSplint for extension " ordered. Advised to continue PT to completion to progress strength and ROM. Continue home exercises.     Continue icing knee as needed up to 4 times daily for no longer than 15-20 mins at a time.     Follow-up in 6 weeks. Repeat x-rays needed. Call with changes or concerns.     Patient was given instructions and counseling regarding his condition or for health maintenance advice. Please see specific information pulled into the AVS if appropriate.

## 2023-12-06 NOTE — PATIENT INSTRUCTIONS
Patient is doing very well, although has continued difficulties with knee extension. Order for L knee DynaSplint for extension ordered. Advised to continue PT to completion to progress strength and ROM. Continue home exercises.     Continue icing knee as needed up to 4 times daily for no longer than 15-20 mins at a time.     Follow-up in 6 weeks. Repeat x-rays needed. Call with changes or concerns.

## 2024-01-30 RX ORDER — OMEPRAZOLE 40 MG/1
40 CAPSULE, DELAYED RELEASE ORAL 2 TIMES DAILY
Qty: 180 CAPSULE | Refills: 1 | Status: SHIPPED | OUTPATIENT
Start: 2024-01-30

## 2024-01-31 ENCOUNTER — OFFICE VISIT (OUTPATIENT)
Dept: ORTHOPEDIC SURGERY | Facility: CLINIC | Age: 74
End: 2024-01-31
Payer: MEDICARE

## 2024-01-31 VITALS
HEART RATE: 68 BPM | HEIGHT: 77 IN | SYSTOLIC BLOOD PRESSURE: 139 MMHG | DIASTOLIC BLOOD PRESSURE: 82 MMHG | OXYGEN SATURATION: 96 % | WEIGHT: 255.73 LBS | BODY MASS INDEX: 30.2 KG/M2

## 2024-01-31 DIAGNOSIS — Z47.1 AFTERCARE FOLLOWING LEFT KNEE JOINT REPLACEMENT SURGERY: ICD-10-CM

## 2024-01-31 DIAGNOSIS — Z96.652 AFTERCARE FOLLOWING LEFT KNEE JOINT REPLACEMENT SURGERY: ICD-10-CM

## 2024-01-31 DIAGNOSIS — M25.562 LEFT KNEE PAIN, UNSPECIFIED CHRONICITY: Primary | ICD-10-CM

## 2024-01-31 RX ORDER — MELOXICAM 15 MG/1
15 TABLET ORAL DAILY
Qty: 30 TABLET | Refills: 2 | Status: SHIPPED | OUTPATIENT
Start: 2024-01-31

## 2024-01-31 NOTE — PATIENT INSTRUCTIONS
X-rays reviewed, showing hardware intact. Advised continued PT. Updated PT order placed. Continue home exercise program to progress strength and ROM. Continue icing knee as needed up to 3-4 times daily for no longer than 15 to 20 minutes at a time.    Encouraged compliance with use of DynaSplint.     Continue with lifelong antibiotic prophylaxis with dental procedures following total joint replacement.    Rx for meloxicam sent to pharmacy.     Follow-up in 6 weeks. Call sooner, if needed with any changes or concerns. No x-rays.

## 2024-01-31 NOTE — PROGRESS NOTES
"Chief Complaint  Pain and Follow-up of the Left Knee    Subjective      Marcio Enrique presents to [facility] for follow-up of left total knee arthroplasty with Flor robot performed on 10/20/2023 by Dr. Figueroa.  Patient presents independently ambulatory without use of assistive device.  Reports that he is overall doing well and his only complaint of pain is during PT.  He does have a Dynasplint for knee extension, although states this is cumbersome and he has not been using this.  PT note reports fair progress with left knee extension -12 degrees and flexion to 112 degrees.    Objective   No Known Allergies    Vital Signs:   /82   Pulse 68   Ht 195.6 cm (77\")   Wt 116 kg (255 lb 11.7 oz)   SpO2 96%   BMI 30.33 kg/m²       Physical Exam    Constitutional: Awake, alert. Well nourished appearance.    Integumentary: Warm, dry, intact. No obvious rashes.    HENT: Atraumatic, normocephalic.   Respiratory: Non labored respirations .   Cardiovascular: Intact peripheral pulses.    Psychiatric: Normal mood and affect. A&O X3    Ortho Exam  Left knee: Skin is warm, dry, and intact.  Well-healed surgical scar noted.  Mild to moderate edema.  -10 degrees of extension and flexion to 108 degrees.  Full plantarflexion and dorsiflexion of the ankle.  Sensation and distal neurovascular intact.  Patient is ambulatory with limping gait.    Imaging Results (Most Recent)       Procedure Component Value Units Date/Time    XR Knee 3 View Left [[id number]] Resulted: 02/02/24 0808     Updated: 02/02/24 0808    Narrative:      X-Ray Report:  Study: X-rays ordered, taken in the office, and reviewed today.   Site: Left knee Xray  Indication: TKA  View: AP/Lateral, Standing, and Alvan view(s)  Findings: Intact left total knee arthroplasty. No evidence of hardware   malfunction.   Prior studies available for comparison: yes                       Assessment and Plan   Problem List Items Addressed This Visit  "   None  Visit Diagnoses       Left knee pain, unspecified chronicity    -  Primary    Relevant Orders    XR Knee 3 View Left (Completed)    Aftercare following left knee joint replacement surgery        Relevant Orders    Ambulatory Referral to Physical Therapy POST OP (Completed)            Follow Up   Return in about 6 weeks (around 3/13/2024).    Tobacco Use: Low Risk  (1/31/2024)    Patient History     Smoking Tobacco Use: Never     Smokeless Tobacco Use: Never     Passive Exposure: Not on file     Patient is a non-smoker.  Did not discuss tobacco cessation options.    Patient Instructions   X-rays reviewed, showing hardware intact. Advised continued PT. Updated PT order placed. Continue home exercise program to progress strength and ROM. Continue icing knee as needed up to 3-4 times daily for no longer than 15 to 20 minutes at a time.    Encouraged compliance with use of DynaSplint.     Continue with lifelong antibiotic prophylaxis with dental procedures following total joint replacement.    Rx for meloxicam sent to pharmacy.     Follow-up in 6 weeks. Call sooner, if needed with any changes or concerns. No x-rays.     Patient was given instructions and counseling regarding his condition or for health maintenance advice. Please see specific information pulled into the AVS if appropriate.

## 2024-03-13 ENCOUNTER — OFFICE VISIT (OUTPATIENT)
Dept: ORTHOPEDIC SURGERY | Facility: CLINIC | Age: 74
End: 2024-03-13
Payer: MEDICARE

## 2024-03-13 VITALS
HEIGHT: 77 IN | SYSTOLIC BLOOD PRESSURE: 90 MMHG | BODY MASS INDEX: 30.46 KG/M2 | OXYGEN SATURATION: 98 % | HEART RATE: 62 BPM | WEIGHT: 257.94 LBS | DIASTOLIC BLOOD PRESSURE: 53 MMHG

## 2024-03-13 DIAGNOSIS — Z96.652 AFTERCARE FOLLOWING LEFT KNEE JOINT REPLACEMENT SURGERY: Primary | ICD-10-CM

## 2024-03-13 DIAGNOSIS — Z47.1 AFTERCARE FOLLOWING LEFT KNEE JOINT REPLACEMENT SURGERY: Primary | ICD-10-CM

## 2024-03-13 NOTE — PATIENT INSTRUCTIONS
Patient is ready for discharge from PT. Advised to continue DynaSplint for extension. Continue home exercise program to progress strength and ROM. Continue icing knee as needed up to 3-4 times daily for no longer than 15 to 20 minutes at a time.    Continue with lifelong antibiotic prophylaxis with dental procedures following total joint replacement.    Follow-up in 8 months. Call sooner, if needed with any changes or concerns. Repeat x-rays.

## 2024-03-13 NOTE — PROGRESS NOTES
"Chief Complaint  Follow-up of the Left Knee    Subjective      Marcio Enrique presents to [facility] for follow-up of left total knee arthroplasty with Flor durbin performed on 10/20/2023 by Dr. Figueroa.  He presents independently ambulatory without use of assistive device.  Reports that his knee is doing \"fairly well\".  Does report that his knee has been sore and swollen lately.  States he has remained in outpatient physical therapy and has been adding exercises, most recently stairstepper.  Otherwise, he endorses continued use of Dynasplint for extension.  He reports continued improvement in extension.  He does feel he is ready to stop physical therapy.    Objective   No Known Allergies    Vital Signs:   BP 90/53   Pulse 62   Ht 195.6 cm (77\")   Wt 117 kg (257 lb 15 oz)   SpO2 98%   BMI 30.59 kg/m²       Physical Exam    Constitutional: Awake, alert. Well nourished appearance.    Integumentary: Warm, dry, intact. No obvious rashes.    HENT: Atraumatic, normocephalic.   Respiratory: Non labored respirations .   Cardiovascular: Intact peripheral pulses.    Psychiatric: Normal mood and affect. A&O X3    Ortho Exam  Left knee: Skin is warm, dry, and intact.  Well-healed surgical scar noted.  There is moderate edema.  Patella is well tracking and knee is stable to varus and valgus stress.  -5 degrees of extension and flexion to 120 degrees.  Full plantarflexion and dorsiflexion of the ankle.  Sensation and distal neurovascular intact.  Smooth sit to stand transition.  Patient fully weightbearing with with nonantalgic gait.    Imaging Results (Most Recent)       None                      Assessment and Plan   Problem List Items Addressed This Visit    None  Visit Diagnoses       Aftercare following left knee joint replacement surgery    -  Primary            Follow Up   Return in about 8 months (around 11/13/2024).    Tobacco Use: Low Risk  (3/13/2024)    Patient History     Smoking Tobacco " Use: Never     Smokeless Tobacco Use: Never     Passive Exposure: Not on file     Patient is a non-smoker.  Did not discuss tobacco cessation options.    Patient Instructions   Patient is ready for discharge from PT. Advised to continue DynaSplint for extension. Continue home exercise program to progress strength and ROM. Continue icing knee as needed up to 3-4 times daily for no longer than 15 to 20 minutes at a time.    Continue with lifelong antibiotic prophylaxis with dental procedures following total joint replacement.    Follow-up in 8 months. Call sooner, if needed with any changes or concerns. Repeat x-rays.     Patient was given instructions and counseling regarding his condition or for health maintenance advice. Please see specific information pulled into the AVS if appropriate.

## 2024-03-26 ENCOUNTER — LAB (OUTPATIENT)
Dept: INTERNAL MEDICINE | Age: 74
End: 2024-03-26
Payer: MEDICARE

## 2024-03-26 DIAGNOSIS — D50.8 IRON DEFICIENCY ANEMIA SECONDARY TO INADEQUATE DIETARY IRON INTAKE: ICD-10-CM

## 2024-03-26 DIAGNOSIS — I10 ESSENTIAL HYPERTENSION: ICD-10-CM

## 2024-03-26 DIAGNOSIS — Z12.5 PROSTATE CANCER SCREENING: ICD-10-CM

## 2024-03-26 DIAGNOSIS — E11.9 TYPE 2 DIABETES MELLITUS WITHOUT COMPLICATION, WITHOUT LONG-TERM CURRENT USE OF INSULIN: ICD-10-CM

## 2024-03-26 LAB
ANION GAP SERPL CALCULATED.3IONS-SCNC: 6 MMOL/L (ref 5–15)
BASOPHILS # BLD AUTO: 0.02 10*3/MM3 (ref 0–0.2)
BASOPHILS NFR BLD AUTO: 0.3 % (ref 0–1.5)
BUN SERPL-MCNC: 18 MG/DL (ref 8–23)
BUN/CREAT SERPL: 14.8 (ref 7–25)
CALCIUM SPEC-SCNC: 9.6 MG/DL (ref 8.6–10.5)
CHLORIDE SERPL-SCNC: 108 MMOL/L (ref 98–107)
CO2 SERPL-SCNC: 29 MMOL/L (ref 22–29)
CREAT SERPL-MCNC: 1.22 MG/DL (ref 0.76–1.27)
DEPRECATED RDW RBC AUTO: 40.9 FL (ref 37–54)
EGFRCR SERPLBLD CKD-EPI 2021: 61.8 ML/MIN/1.73
EOSINOPHIL # BLD AUTO: 0.28 10*3/MM3 (ref 0–0.4)
EOSINOPHIL NFR BLD AUTO: 4.4 % (ref 0.3–6.2)
ERYTHROCYTE [DISTWIDTH] IN BLOOD BY AUTOMATED COUNT: 13 % (ref 12.3–15.4)
FERRITIN SERPL-MCNC: 174 NG/ML (ref 30–400)
GLUCOSE SERPL-MCNC: 142 MG/DL (ref 65–99)
HBA1C MFR BLD: 6.7 % (ref 4.8–5.6)
HCT VFR BLD AUTO: 37.3 % (ref 37.5–51)
HGB BLD-MCNC: 12.6 G/DL (ref 13–17.7)
IMM GRANULOCYTES # BLD AUTO: 0.02 10*3/MM3 (ref 0–0.05)
IMM GRANULOCYTES NFR BLD AUTO: 0.3 % (ref 0–0.5)
IRON 24H UR-MRATE: 73 MCG/DL (ref 59–158)
IRON SATN MFR SERPL: 23 % (ref 20–50)
LYMPHOCYTES # BLD AUTO: 2.03 10*3/MM3 (ref 0.7–3.1)
LYMPHOCYTES NFR BLD AUTO: 31.6 % (ref 19.6–45.3)
MCH RBC QN AUTO: 29.6 PG (ref 26.6–33)
MCHC RBC AUTO-ENTMCNC: 33.8 G/DL (ref 31.5–35.7)
MCV RBC AUTO: 87.6 FL (ref 79–97)
MONOCYTES # BLD AUTO: 0.51 10*3/MM3 (ref 0.1–0.9)
MONOCYTES NFR BLD AUTO: 7.9 % (ref 5–12)
NEUTROPHILS NFR BLD AUTO: 3.57 10*3/MM3 (ref 1.7–7)
NEUTROPHILS NFR BLD AUTO: 55.5 % (ref 42.7–76)
NRBC BLD AUTO-RTO: 0 /100 WBC (ref 0–0.2)
PLATELET # BLD AUTO: 278 10*3/MM3 (ref 140–450)
PMV BLD AUTO: 9.8 FL (ref 6–12)
POTASSIUM SERPL-SCNC: 4.4 MMOL/L (ref 3.5–5.2)
PSA SERPL-MCNC: 2.83 NG/ML (ref 0–4)
RBC # BLD AUTO: 4.26 10*6/MM3 (ref 4.14–5.8)
SODIUM SERPL-SCNC: 143 MMOL/L (ref 136–145)
TIBC SERPL-MCNC: 311 MCG/DL (ref 298–536)
TRANSFERRIN SERPL-MCNC: 209 MG/DL (ref 200–360)
WBC NRBC COR # BLD AUTO: 6.43 10*3/MM3 (ref 3.4–10.8)

## 2024-03-26 PROCEDURE — 83036 HEMOGLOBIN GLYCOSYLATED A1C: CPT | Performed by: INTERNAL MEDICINE

## 2024-03-26 PROCEDURE — 82728 ASSAY OF FERRITIN: CPT | Performed by: INTERNAL MEDICINE

## 2024-03-26 PROCEDURE — 83540 ASSAY OF IRON: CPT | Performed by: INTERNAL MEDICINE

## 2024-03-26 PROCEDURE — 36415 COLL VENOUS BLD VENIPUNCTURE: CPT | Performed by: INTERNAL MEDICINE

## 2024-03-26 PROCEDURE — 84466 ASSAY OF TRANSFERRIN: CPT | Performed by: INTERNAL MEDICINE

## 2024-03-26 PROCEDURE — G0103 PSA SCREENING: HCPCS | Performed by: INTERNAL MEDICINE

## 2024-03-26 PROCEDURE — 85025 COMPLETE CBC W/AUTO DIFF WBC: CPT | Performed by: INTERNAL MEDICINE

## 2024-03-26 PROCEDURE — 80048 BASIC METABOLIC PNL TOTAL CA: CPT | Performed by: INTERNAL MEDICINE

## 2024-03-28 ENCOUNTER — OFFICE VISIT (OUTPATIENT)
Dept: INTERNAL MEDICINE | Age: 74
End: 2024-03-28
Payer: MEDICARE

## 2024-03-28 VITALS
TEMPERATURE: 98.4 F | DIASTOLIC BLOOD PRESSURE: 78 MMHG | HEIGHT: 77 IN | BODY MASS INDEX: 30.77 KG/M2 | OXYGEN SATURATION: 98 % | SYSTOLIC BLOOD PRESSURE: 136 MMHG | HEART RATE: 75 BPM | WEIGHT: 260.6 LBS

## 2024-03-28 DIAGNOSIS — Z00.00 WELL ADULT HEALTH CHECK: ICD-10-CM

## 2024-03-28 DIAGNOSIS — D50.8 IRON DEFICIENCY ANEMIA SECONDARY TO INADEQUATE DIETARY IRON INTAKE: ICD-10-CM

## 2024-03-28 DIAGNOSIS — Z96.652 S/P TKR (TOTAL KNEE REPLACEMENT), LEFT: ICD-10-CM

## 2024-03-28 DIAGNOSIS — E11.9 TYPE 2 DIABETES MELLITUS WITHOUT COMPLICATION, WITHOUT LONG-TERM CURRENT USE OF INSULIN: Primary | ICD-10-CM

## 2024-03-28 DIAGNOSIS — E78.2 MIXED HYPERLIPIDEMIA: ICD-10-CM

## 2024-03-28 DIAGNOSIS — I10 ESSENTIAL HYPERTENSION: ICD-10-CM

## 2024-03-28 PROBLEM — M25.512 LEFT SHOULDER PAIN: Status: RESOLVED | Noted: 2023-07-28 | Resolved: 2024-03-28

## 2024-03-28 NOTE — PROGRESS NOTES
"Chief Complaint  Follow-up (Pt is a 73 y.o. male, present in office today for 4 month follow up; Diabetes, Hypertension, Hyperlipidemia)    Subjective          Marcio Enrique presents to [facility]     History of Present Illness:  73-year-old male with underlying hypertension, hyperlipidemia, diabetes, who is coming in 3/24 for routine 4-month follow-up.  We will go over his meds, review his labs in detail, and address any new concerns he may have.    Review of Systems   Constitutional:  Negative for appetite change, fatigue and fever.   HENT:  Negative for congestion and ear pain.    Eyes:  Negative for blurred vision.   Respiratory:  Negative for cough, chest tightness, shortness of breath and wheezing.    Cardiovascular:  Negative for chest pain, palpitations and leg swelling.   Gastrointestinal:  Negative for abdominal pain.   Genitourinary:  Negative for difficulty urinating, dysuria and hematuria.   Musculoskeletal:  Negative for arthralgias and gait problem.   Skin:  Negative for skin lesions.   Neurological:  Negative for syncope, memory problem and confusion.   Psychiatric/Behavioral:  Negative for self-injury and depressed mood.        Objective   Vital Signs:   /78 (BP Location: Left arm, Patient Position: Sitting, Cuff Size: Adult)   Pulse 75   Temp 98.4 °F (36.9 °C) (Temporal)   Ht 195.6 cm (77\")   Wt 118 kg (260 lb 9.6 oz)   SpO2 98%   BMI 30.90 kg/m²           Physical Exam  Vitals and nursing note reviewed.   Constitutional:       General: He is not in acute distress.     Appearance: Normal appearance. He is not toxic-appearing.   HENT:      Head: Atraumatic.      Right Ear: External ear normal.      Left Ear: External ear normal.      Nose: Nose normal.      Mouth/Throat:      Mouth: Mucous membranes are moist.   Eyes:      General:         Right eye: No discharge.         Left eye: No discharge.      Extraocular Movements: Extraocular movements intact. "      Pupils: Pupils are equal, round, and reactive to light.   Cardiovascular:      Rate and Rhythm: Normal rate and regular rhythm.      Pulses: Normal pulses.      Heart sounds: Normal heart sounds. No murmur heard.     No gallop.   Pulmonary:      Effort: Pulmonary effort is normal. No respiratory distress.      Breath sounds: No wheezing, rhonchi or rales.   Abdominal:      General: There is no distension.      Palpations: Abdomen is soft. There is no mass.      Tenderness: There is no abdominal tenderness. There is no guarding.   Musculoskeletal:         General: No swelling or tenderness.      Cervical back: No tenderness.      Right lower leg: No edema.      Left lower leg: No edema.   Skin:     General: Skin is warm and dry.      Findings: No rash.   Neurological:      General: No focal deficit present.      Mental Status: He is alert and oriented to person, place, and time. Mental status is at baseline.      Motor: No weakness.      Gait: Gait normal.   Psychiatric:         Mood and Affect: Mood normal.         Thought Content: Thought content normal.          Result Review :   The following data was reviewed by: lAeksandr Olmstead MD on 08/23/2021:                  Assessment and Plan    Diagnoses and all orders for this visit:    1. Type 2 diabetes mellitus without complication, without long-term current use of insulin (Primary)  Assessment & Plan:  A1c is very stable at 6.7 as of his 3/24 OV.  He is just on low-dose metformin, takes both pills in the morning, stable to continue this.    Orders:  -     Hemoglobin A1c; Future    2. Essential hypertension  Assessment & Plan:  Blood pressure remains well-controlled and his pulse is in the 70s as of his 3/24 office visit.  He is stable to continue with full dose amlodipine/valsartan, along with low doses of doxazosin and metoprolol.    Orders:  -     Comprehensive Metabolic Panel; Future    3. Mixed hyperlipidemia  Assessment & Plan:  LDL was at goal in 11/23, he  can continue with low-dose pravastatin, repeat labs on return to office this summer.    Orders:  -     Lipid Panel; Future    4. Iron deficiency anemia secondary to inadequate dietary iron intake  Overview:  He donates.    Assessment & Plan:  Hemoglobin is gradually recovering postop, he is at 12.6 as of 3/24.  Iron studies are reasonable, we will follow-up this summer, no supplementation needed.    Orders:  -     CBC & Differential; Future  -     Iron Profile; Future  -     Ferritin; Future    5. Well adult health check  -     TSH; Future  -     T4, free; Future    6. S/P TKR (total knee replacement), left  Assessment & Plan:  Patient completed physical therapy just a week or so ago as of his 3/24 OV.  He is currently on full dose meloxicam, was given a 3-month prescription for this by orthopedics back in January.  He will take it for about another month and then hopefully be able to remain off of it.          --  --  Older notes:  VISIT 4/21:   ANNUAL MEDICARE WELLNESS EXAM 12/20 = reviewed all forms with pt; no new discoveries.  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  --  DM stable on only 500 qd and that is fine...6.3...6.9 and I d/w bad trend...6.9 is holding on 500 qd only... 7.4 and rec 1000 qd again...7.0...7.4 is due to the steroids, and they are being weaned, so no med changes made 9/19...6.9 is back down off steroids...7.5 and will increase on RTO if same/higher = only on 500 qd recall...6.9 on 1000 qAM as of 8/20 OV---> 7.1 is fine 4/21. (TSH neg 4/21).  --  LIPIDS stable 3/14 w/o treatment...elevated CK without statins anyhow...defer... holding steady...122 and will  add statin now...78, but CK up and c/o joints at 5/18 OV, so will hold Pravastatin 10 mg for now...86 is fine...96=ok for now---> 84 in 4/20.  --  HTN with need for checks at home before increase Exforge to max dose as of 2/18 OV... BP stable off HCTZ and on toprol...required increase exforge per Karyn; BP great 4/19 OV despite back on Naproxen past week---> stable 4/21.  --  SYNCOPE 2/18 and to ER for 5 hrs; had neg V/Q; did have aura, so most c/w vasovagal; exam neg; will get ECHO/Dopplers/tilt to complete w/u...ECHO is as below, the Carotids 3/18 were neg, and TILT + for NEUROCARDIOGENIC SYNCOPE=will try to lose HCTZ and add in low dose of toprol; pt to monitor BP and call with results; d/w keep hydrated/heed warning signs...c/o dizziness with rapid head mvmt and rec eval per ENT now...got better on it's own.  ASCENDING AORTIC DILATATION=3.8 cm per ECHO 3/18 (d/w nl 3.5 or less, dilated 3.5 to 4.5, aneurysmal is above 4.4, and surgery above 5.5).  --  DJD with L knee/shoulder requiring MRI's...increased sxs 5/18, so holding statin...c/o knee again 12/18, but doesn't sound severe; OTC meds for now...I was unaware of bump in BP, so I started Naprosyn back a week ago...on prednisone as of 6/19 OV with Dr Mcgovern following now...ESR down 86 to 9 on prednisone as of 9/19 OV...still being weaned as of 12/19 OV and I d/w add tylenol to help offset the prednisone wean.  --  H PYLORI +...d/w tx plan with prilosec 20 mg/amoxil 1000mg/biaxin 500 mg all bid for 14 days...tolerated this.  GERD/BURPPING was better when he was on PPI for above, so try qod...no c/o 10/17.  GALLSTONES noted per below eval and will eval if persistent.  R ABD PAIN to ER, was nauseated, CT=? liver cysts and L renal lesion, he's concerned, so will get MRI 2/16... MRI 3/17 is with stable cysts.   --  BPH with intol to cardura prior, slow stream but no other sx's...defer for now.   --  --  PSA 2.8 (3/26/2024)  COLON 8/22 = Cologuard negative  PNEUMOVAX  #1 12/13, Prevnar '16; rec Hep A 5/18.  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    Follow Up   Return in about 4 months (around 7/28/2024).  Patient was given instructions and counseling regarding his condition or for health maintenance advice. Please see specific information pulled into the AVS if appropriate.

## 2024-03-28 NOTE — ASSESSMENT & PLAN NOTE
Hemoglobin is gradually recovering postop, he is at 12.6 as of 3/24.  Iron studies are reasonable, we will follow-up this summer, no supplementation needed.

## 2024-03-28 NOTE — ASSESSMENT & PLAN NOTE
A1c is very stable at 6.7 as of his 3/24 OV.  He is just on low-dose metformin, takes both pills in the morning, stable to continue this.

## 2024-03-28 NOTE — ASSESSMENT & PLAN NOTE
Blood pressure remains well-controlled and his pulse is in the 70s as of his 3/24 office visit.  He is stable to continue with full dose amlodipine/valsartan, along with low doses of doxazosin and metoprolol.

## 2024-03-28 NOTE — ASSESSMENT & PLAN NOTE
LDL was at goal in 11/23, he can continue with low-dose pravastatin, repeat labs on return to office this summer.

## 2024-03-28 NOTE — ASSESSMENT & PLAN NOTE
Patient completed physical therapy just a week or so ago as of his 3/24 OV.  He is currently on full dose meloxicam, was given a 3-month prescription for this by orthopedics back in January.  He will take it for about another month and then hopefully be able to remain off of it.

## 2024-04-22 RX ORDER — METOPROLOL SUCCINATE 25 MG/1
12.5 TABLET, EXTENDED RELEASE ORAL DAILY
Qty: 45 TABLET | Refills: 1 | Status: SHIPPED | OUTPATIENT
Start: 2024-04-22

## 2024-04-25 RX ORDER — MELOXICAM 15 MG/1
15 TABLET ORAL DAILY
Qty: 30 TABLET | Refills: 2 | Status: SHIPPED | OUTPATIENT
Start: 2024-04-25

## 2024-05-23 ENCOUNTER — OFFICE VISIT (OUTPATIENT)
Dept: INTERNAL MEDICINE | Age: 74
End: 2024-05-23
Payer: MEDICARE

## 2024-05-23 VITALS
OXYGEN SATURATION: 98 % | SYSTOLIC BLOOD PRESSURE: 140 MMHG | DIASTOLIC BLOOD PRESSURE: 80 MMHG | TEMPERATURE: 97.3 F | WEIGHT: 263.8 LBS | BODY MASS INDEX: 31.15 KG/M2 | HEIGHT: 77 IN | HEART RATE: 55 BPM

## 2024-05-23 DIAGNOSIS — R06.09 DYSPNEA ON EXERTION: ICD-10-CM

## 2024-05-23 DIAGNOSIS — E11.9 TYPE 2 DIABETES MELLITUS WITHOUT COMPLICATION, WITHOUT LONG-TERM CURRENT USE OF INSULIN: ICD-10-CM

## 2024-05-23 DIAGNOSIS — I10 ESSENTIAL HYPERTENSION: ICD-10-CM

## 2024-05-23 DIAGNOSIS — D50.8 IRON DEFICIENCY ANEMIA SECONDARY TO INADEQUATE DIETARY IRON INTAKE: ICD-10-CM

## 2024-05-23 DIAGNOSIS — Z00.00 MEDICARE ANNUAL WELLNESS VISIT, SUBSEQUENT: Primary | ICD-10-CM

## 2024-05-23 DIAGNOSIS — Z00.00 WELL ADULT HEALTH CHECK: ICD-10-CM

## 2024-05-23 DIAGNOSIS — E78.2 MIXED HYPERLIPIDEMIA: ICD-10-CM

## 2024-05-23 DIAGNOSIS — R53.83 OTHER FATIGUE: ICD-10-CM

## 2024-05-23 LAB
ALBUMIN SERPL-MCNC: 4 G/DL (ref 3.5–5.2)
ALBUMIN/GLOB SERPL: 1.3 G/DL
ALP SERPL-CCNC: 67 U/L (ref 39–117)
ALT SERPL W P-5'-P-CCNC: 16 U/L (ref 1–41)
ANION GAP SERPL CALCULATED.3IONS-SCNC: 11.7 MMOL/L (ref 5–15)
AST SERPL-CCNC: 21 U/L (ref 1–40)
BILIRUB SERPL-MCNC: 0.2 MG/DL (ref 0–1.2)
BUN SERPL-MCNC: 19 MG/DL (ref 8–23)
BUN/CREAT SERPL: 14.3 (ref 7–25)
CALCIUM SPEC-SCNC: 9.5 MG/DL (ref 8.6–10.5)
CHLORIDE SERPL-SCNC: 107 MMOL/L (ref 98–107)
CHOLEST SERPL-MCNC: 140 MG/DL (ref 0–200)
CO2 SERPL-SCNC: 25.3 MMOL/L (ref 22–29)
CREAT SERPL-MCNC: 1.33 MG/DL (ref 0.76–1.27)
EGFRCR SERPLBLD CKD-EPI 2021: 55.4 ML/MIN/1.73
FERRITIN SERPL-MCNC: 198 NG/ML (ref 30–400)
FOLATE SERPL-MCNC: 19.6 NG/ML (ref 4.78–24.2)
GLOBULIN UR ELPH-MCNC: 3 GM/DL
GLUCOSE SERPL-MCNC: 131 MG/DL (ref 65–99)
HBA1C MFR BLD: 6.5 % (ref 4.8–5.6)
HDLC SERPL-MCNC: 52 MG/DL (ref 40–60)
IRON 24H UR-MRATE: 70 MCG/DL (ref 59–158)
IRON SATN MFR SERPL: 20 % (ref 20–50)
LDLC SERPL CALC-MCNC: 69 MG/DL (ref 0–100)
LDLC/HDLC SERPL: 1.28 {RATIO}
POTASSIUM SERPL-SCNC: 4.8 MMOL/L (ref 3.5–5.2)
PROT SERPL-MCNC: 7 G/DL (ref 6–8.5)
SODIUM SERPL-SCNC: 144 MMOL/L (ref 136–145)
T4 FREE SERPL-MCNC: 1.14 NG/DL (ref 0.93–1.7)
TIBC SERPL-MCNC: 350 MCG/DL (ref 298–536)
TRANSFERRIN SERPL-MCNC: 235 MG/DL (ref 200–360)
TRIGL SERPL-MCNC: 106 MG/DL (ref 0–150)
TSH SERPL DL<=0.05 MIU/L-ACNC: 2.63 UIU/ML (ref 0.27–4.2)
VIT B12 BLD-MCNC: 881 PG/ML (ref 211–946)
VLDLC SERPL-MCNC: 19 MG/DL (ref 5–40)

## 2024-05-23 PROCEDURE — 84402 ASSAY OF FREE TESTOSTERONE: CPT | Performed by: INTERNAL MEDICINE

## 2024-05-23 PROCEDURE — 84443 ASSAY THYROID STIM HORMONE: CPT | Performed by: INTERNAL MEDICINE

## 2024-05-23 PROCEDURE — 84439 ASSAY OF FREE THYROXINE: CPT | Performed by: INTERNAL MEDICINE

## 2024-05-23 PROCEDURE — 82746 ASSAY OF FOLIC ACID SERUM: CPT | Performed by: INTERNAL MEDICINE

## 2024-05-23 PROCEDURE — 83540 ASSAY OF IRON: CPT | Performed by: INTERNAL MEDICINE

## 2024-05-23 PROCEDURE — 80053 COMPREHEN METABOLIC PANEL: CPT | Performed by: INTERNAL MEDICINE

## 2024-05-23 PROCEDURE — 84403 ASSAY OF TOTAL TESTOSTERONE: CPT | Performed by: INTERNAL MEDICINE

## 2024-05-23 PROCEDURE — 82728 ASSAY OF FERRITIN: CPT | Performed by: INTERNAL MEDICINE

## 2024-05-23 PROCEDURE — 80061 LIPID PANEL: CPT | Performed by: INTERNAL MEDICINE

## 2024-05-23 PROCEDURE — 84466 ASSAY OF TRANSFERRIN: CPT | Performed by: INTERNAL MEDICINE

## 2024-05-23 PROCEDURE — 82607 VITAMIN B-12: CPT | Performed by: INTERNAL MEDICINE

## 2024-05-23 PROCEDURE — 83036 HEMOGLOBIN GLYCOSYLATED A1C: CPT | Performed by: INTERNAL MEDICINE

## 2024-05-23 NOTE — ASSESSMENT & PLAN NOTE
As noted below, patient had echo and stress imaging study in 10/21 with no evidence of ischemia, no CHF. He had a chest x-ray earlier this year that was very clear. He had PFTs earlier this month that were well normal, 97 to 101% of predicted. ---> This is an issue again as of his 5/24 OV.  His sats are 98% on room air, his lungs sound clear.  It has been sometime since he has had a routine chest x-ray, will start with this.  May need referral to pulmonary for further evaluation if symptoms persist.    Additionally, patient is having some fatigue, he feels exhausted a lot, we had a normal stress imaging study about 3 years ago, but he does have underlying diabetes, will get a CT calcium score.  Now that I am thinking of it, we can forego the chest x-ray since will be obtaining the CT of the chest with this test.

## 2024-05-23 NOTE — ASSESSMENT & PLAN NOTE
Iron was stable in 3/24, he had received iron infusions, actually ferritin was high for short time.  We have planned on checking it here in a couple of months, but patient is having increased fatigue as of his 5/24 OV, so we will check it today.

## 2024-05-23 NOTE — ASSESSMENT & PLAN NOTE
Addressing this as per the dyspnea on exertion heading.    Additionally, we will follow-up his iron deficiency anemia as noted under the heading below.  Additionally it has been sometime since we have checked his testosterone and thyroid levels.    Will get all those labs today, asked patient to call tomorrow for the results.    (May need to address SSRI dosing/etc).

## 2024-05-23 NOTE — PROGRESS NOTES
The ABCs of the Annual Wellness Visit  Subsequent Medicare Wellness Visit    Subjective      Marcio Enrique is a 74 y.o. male who presents for a Subsequent Medicare Wellness Visit.    The following portions of the patient's history were reviewed and   updated as appropriate: allergies, current medications, past family history, past medical history, past social history, past surgical history, and problem list.    Compared to one year ago, the patient feels his physical   health is worse.---> In regards to fatigue presently.    Compared to one year ago, the patient feels his mental   health is the same.    Recent Hospitalizations:  He was not admitted to the hospital during the last year.       Current Medical Providers:  Patient Care Team:  Aleksandr Olmstead MD as PCP - General (Internal Medicine)    Outpatient Medications Prior to Visit   Medication Sig Dispense Refill    amLODIPine-valsartan (EXFORGE)  MG per tablet Take 1 tablet by mouth Daily. 90 tablet 1    Blood Glucose Calibration (Accu-Chek Guide Control) liquid 1 each by In Vitro route Daily As Needed (machine needing controled). 1 each 5    cetirizine (zyrTEC) 10 MG tablet Take 1 tablet by mouth Daily. 90 tablet 1    doxazosin (CARDURA) 2 MG tablet Take 1 tablet by mouth Every Night. 90 tablet 1    meloxicam (MOBIC) 15 MG tablet TAKE 1 TABLET BY MOUTH EVERY DAY 30 tablet 2    metFORMIN ER (GLUCOPHAGE-XR) 500 MG 24 hr tablet Take 2 tablets by mouth Daily With Breakfast. 180 tablet 1    metoprolol succinate XL (TOPROL-XL) 25 MG 24 hr tablet TAKE 1/2 TABLET BY MOUTH DAILY 45 tablet 1    multivitamin with minerals tablet tablet Take 1 tablet by mouth Daily.      omeprazole (priLOSEC) 40 MG capsule TAKE 1 CAPSULE BY MOUTH TWICE A  capsule 1    pravastatin (PRAVACHOL) 20 MG tablet Take 1 tablet by mouth Daily. 90 tablet 1    sertraline (ZOLOFT) 50 MG tablet Take 1 tablet by mouth Daily. 90 tablet 1    traZODone (DESYREL) 50 MG tablet Take 2 tablets by  "mouth Every Night. 180 tablet 1     No facility-administered medications prior to visit.       No opioid medication identified on active medication list. I have reviewed chart for other potential  high risk medication/s and harmful drug interactions in the elderly.        Aspirin is not on active medication list.  Aspirin use is not indicated based on review of current medical condition/s. Risk of harm outweighs potential benefits.  .    Patient Active Problem List   Diagnosis    Essential hypertension    Gastro-esophageal reflux disease without esophagitis    Mixed hyperlipidemia    Type 2 diabetes mellitus without complication, without long-term current use of insulin    Primary osteoarthritis involving multiple joints    Iron deficiency anemia secondary to inadequate dietary iron intake    Dyspnea on exertion    Vertigo    Seborrheic dermatitis    Medicare annual wellness visit, subsequent    Primary insomnia    S/P TKR (total knee replacement), left     Advance Care Planning   Advance Care Planning     Advance Directive is not on file.  ACP discussion was held with the patient during this visit. Patient does not have an advance directive, information provided.     Objective    Vitals:    05/23/24 0950   BP: 140/80   Pulse: 55   Temp: 97.3 °F (36.3 °C)   TempSrc: Skin   SpO2: 98%   Weight: 120 kg (263 lb 12.8 oz)   Height: 195.6 cm (77.01\")     Estimated body mass index is 31.28 kg/m² as calculated from the following:    Height as of this encounter: 195.6 cm (77.01\").    Weight as of this encounter: 120 kg (263 lb 12.8 oz).           Does the patient have evidence of cognitive impairment?   No    Lab Results   Component Value Date    HGBA1C 6.70 (H) 03/26/2024          HEALTH RISK ASSESSMENT    Smoking Status:  Social History     Tobacco Use   Smoking Status Never   Smokeless Tobacco Never     Alcohol Consumption:  Social History     Substance and Sexual Activity   Alcohol Use No     Fall Risk Screen:    MODESTO " Fall Risk Assessment was completed, and patient is at LOW risk for falls.Assessment completed on:2024    Depression Screenin/23/2024    10:00 AM   PHQ-2/PHQ-9 Depression Screening   Little Interest or Pleasure in Doing Things 0-->not at all   Feeling Down, Depressed or Hopeless 0-->not at all   PHQ-9: Brief Depression Severity Measure Score 0       Health Habits and Functional and Cognitive Screenin/23/2024    10:00 AM   Functional & Cognitive Status   Do you have difficulty preparing food and eating? No   Do you have difficulty bathing yourself, getting dressed or grooming yourself? No   Do you have difficulty using the toilet? No   Do you have difficulty moving around from place to place? No   Do you have trouble with steps or getting out of a bed or a chair? No   Current Diet Well Balanced Diet   Do you need help using the phone?  No   Are you deaf or do you have serious difficulty hearing?  No   Do you need help to go to places out of walking distance? No   Do you need help shopping? No   Do you need help preparing meals?  No   Do you need help with housework?  No   Do you need help with laundry? No   Do you need help taking your medications? No   Do you need help managing money? No   Do you ever drive or ride in a car without wearing a seat belt? No   Have you felt unusual stress, anger or loneliness in the last month? No   Do you have difficulty concentrating, remembering or making decisions? No       Age-appropriate Screening Schedule:  Refer to the list below for future screening recommendations based on patient's age, sex and/or medical conditions. Orders for these recommended tests are listed in the plan section. The patient has been provided with a written plan.    Health Maintenance   Topic Date Due    ZOSTER VACCINE (1 of 2) Never done    RSV Vaccine - Adults (1 - 1-dose 60+ series) Never done    TDAP/TD VACCINES (1 - Tdap) 2022    DIABETIC EYE EXAM  2024    URINE  MICROALBUMIN  07/25/2024    INFLUENZA VACCINE  08/01/2024    HEMOGLOBIN A1C  09/26/2024    LIPID PANEL  11/27/2024    BMI FOLLOWUP  01/31/2025    ANNUAL WELLNESS VISIT  05/23/2025    COLORECTAL CANCER SCREENING  08/01/2025    HEPATITIS C SCREENING  Completed    Pneumococcal Vaccine 65+  Completed    COVID-19 Vaccine  Discontinued                  CMS Preventative Services Quick Reference  Risk Factors Identified During Encounter:    None Identified    The above risks/problems have been discussed with the patient.  Pertinent information has been shared with the patient in the After Visit Summary.    Diagnoses and all orders for this visit:    1. Medicare annual wellness visit, subsequent (Primary)  Assessment & Plan:  AWV completed 5/24.  Remains very active and independent.  No falls, no hospitalizations.  Discussed with patient need for living will/etc, will get him a copy of the advance directive packet today..      2. Essential hypertension  Assessment & Plan:  Blood pressure stable and his pulse is in the mid 50s as of his 5/24 OV.  He can continue with full doses of amlodipine and valsartan as well as low doses of doxazosin and metoprolol.          Follow Up:   Next Medicare Wellness visit to be scheduled in 1 year.      An After Visit Summary and PPPS were made available to the patient.

## 2024-05-23 NOTE — ASSESSMENT & PLAN NOTE
AWV completed 5/24.  Remains very active and independent.  No falls, no hospitalizations.  Discussed with patient need for living will/etc, will get him a copy of the advance directive packet today..

## 2024-05-23 NOTE — ASSESSMENT & PLAN NOTE
Blood pressure stable and his pulse is in the mid 50s as of his 5/24 OV.  He can continue with full doses of amlodipine and valsartan as well as low doses of doxazosin and metoprolol.

## 2024-05-23 NOTE — PROGRESS NOTES
"Chief Complaint  Fatigue (PT states that he is exhausted), Off balance (Pt states that he is off balance. /His daughter feels that he is not getting enough protein. ), and Shallow breathing (He states that his breathing has changed. )    Subjective          Marcio RUEL Enrique presents to [facility]     History of Present Illness:  73-year-old male with underlying hypertension, hyperlipidemia, diabetes, who is coming in 3/24 for routine 4-month follow-up.  We will go over his meds, review his labs in detail, and address any new concerns he may have.---> Patient being seen in 5/24 for new issue as per chief complaint above.    Review of Systems   Constitutional:  Negative for appetite change, fatigue and fever.   HENT:  Negative for congestion and ear pain.    Eyes:  Negative for blurred vision.   Respiratory:  Negative for cough, chest tightness, shortness of breath and wheezing.    Cardiovascular:  Negative for chest pain, palpitations and leg swelling.   Gastrointestinal:  Negative for abdominal pain.   Genitourinary:  Negative for difficulty urinating, dysuria and hematuria.   Musculoskeletal:  Negative for arthralgias and gait problem.   Skin:  Negative for skin lesions.   Neurological:  Negative for syncope, memory problem and confusion.   Psychiatric/Behavioral:  Negative for self-injury and depressed mood.        Objective   Vital Signs:   /80   Pulse 55   Temp 97.3 °F (36.3 °C) (Skin)   Ht 195.6 cm (77.01\")   Wt 120 kg (263 lb 12.8 oz)   SpO2 98%   BMI 31.28 kg/m²           Physical Exam  Vitals and nursing note reviewed.   Constitutional:       General: He is not in acute distress.     Appearance: Normal appearance. He is not toxic-appearing.   HENT:      Head: Atraumatic.      Right Ear: External ear normal.      Left Ear: External ear normal.      Nose: Nose normal.      Mouth/Throat:      Mouth: Mucous membranes are moist.   Eyes:      General:         Right eye: " No discharge.         Left eye: No discharge.      Extraocular Movements: Extraocular movements intact.      Pupils: Pupils are equal, round, and reactive to light.   Cardiovascular:      Rate and Rhythm: Normal rate and regular rhythm.      Pulses: Normal pulses.      Heart sounds: Normal heart sounds. No murmur heard.     No gallop.   Pulmonary:      Effort: Pulmonary effort is normal. No respiratory distress.      Breath sounds: No wheezing, rhonchi or rales.   Abdominal:      General: There is no distension.      Palpations: Abdomen is soft. There is no mass.      Tenderness: There is no abdominal tenderness. There is no guarding.   Musculoskeletal:         General: No swelling or tenderness.      Cervical back: No tenderness.      Right lower leg: No edema.      Left lower leg: No edema.   Skin:     General: Skin is warm and dry.      Findings: No rash.   Neurological:      General: No focal deficit present.      Mental Status: He is alert and oriented to person, place, and time. Mental status is at baseline.      Motor: No weakness.      Gait: Gait normal.   Psychiatric:         Mood and Affect: Mood normal.         Thought Content: Thought content normal.          Result Review :   The following data was reviewed by: Aleksandr Olmstead MD on 08/23/2021:                  Assessment and Plan    Diagnoses and all orders for this visit:    1. Medicare annual wellness visit, subsequent (Primary)  Assessment & Plan:  AWV completed 5/24.  Remains very active and independent.  No falls, no hospitalizations.  Discussed with patient need for living will/etc, will get him a copy of the advance directive packet today..      2. Essential hypertension  Assessment & Plan:  Blood pressure stable and his pulse is in the mid 50s as of his 5/24 OV.  He can continue with full doses of amlodipine and valsartan as well as low doses of doxazosin and metoprolol.    Orders:  -     Comprehensive Metabolic Panel; Future    3. Dyspnea on  exertion  Overview:  PFTs 4/22:    Spirometry:  No obstructive lung defect noted.  FEV1 is 3.86 L / 101% predicted.  FVC is 4.88 L / 97% predicted.  No bronchodilator response was noted.  Flow volume loop within normal limits.     Lung volumes:  No evidence of restriction, hyperinflation or air trapping.     Diffusion capacity:  Diffuse capacity with normal limits at 82% predicted.     Overall impression:  No evidence of obstruction or restriction.  No bronchodilator response was noted.  Diffuse capacity within normal limits.    Assessment & Plan:  As noted below, patient had echo and stress imaging study in 10/21 with no evidence of ischemia, no CHF. He had a chest x-ray earlier this year that was very clear. He had PFTs earlier this month that were well normal, 97 to 101% of predicted. ---> This is an issue again as of his 5/24 OV.  His sats are 98% on room air, his lungs sound clear.  It has been sometime since he has had a routine chest x-ray, will start with this.  May need referral to pulmonary for further evaluation if symptoms persist.    Additionally, patient is having some fatigue, he feels exhausted a lot, we had a normal stress imaging study about 3 years ago, but he does have underlying diabetes, will get a CT calcium score.  Now that I am thinking of it, we can forego the chest x-ray since will be obtaining the CT of the chest with this test.    Orders:  -     CT Cardiac Calcium Score Without Dye; Future    4. Type 2 diabetes mellitus without complication, without long-term current use of insulin  -     CT Cardiac Calcium Score Without Dye; Future    5. Other fatigue  Assessment & Plan:  Addressing this as per the dyspnea on exertion heading.    Additionally, we will follow-up his iron deficiency anemia as noted under the heading below.  Additionally it has been sometime since we have checked his testosterone and thyroid levels.    Will get all those labs today, asked patient to call tomorrow for the  results.    (May need to address SSRI dosing/etc).    Orders:  -     Vitamin B12 anemia; Future  -     Folate anemia; Future  -     TSH; Future  -     T4, free; Future  -     Testosterone, Free, Total; Future    6. Iron deficiency anemia secondary to inadequate dietary iron intake  Overview:  He donates.    Assessment & Plan:  Iron was stable in 3/24, he had received iron infusions, actually ferritin was high for short time.  We have planned on checking it here in a couple of months, but patient is having increased fatigue as of his 5/24 OV, so we will check it today.    Orders:  -     Ferritin; Future  -     CBC & Differential; Future  -     Iron Profile; Future          --  --  Older notes:  VISIT 4/21:   ANNUAL MEDICARE WELLNESS EXAM 12/20 = reviewed all forms with pt; no new discoveries.  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  --  DM stable on only 500 qd and that is fine...6.3...6.9 and I d/w bad trend...6.9 is holding on 500 qd only... 7.4 and rec 1000 qd again...7.0...7.4 is due to the steroids, and they are being weaned, so no med changes made 9/19...6.9 is back down off steroids...7.5 and will increase on RTO if same/higher = only on 500 qd recall...6.9 on 1000 qAM as of 8/20 OV---> 7.1 is fine 4/21. (TSH neg 4/21).  --  LIPIDS stable 3/14 w/o treatment...elevated CK without statins anyhow...defer... holding steady...122 and will add statin now...78, but CK up and c/o joints at 5/18 OV, so will hold Pravastatin 10 mg for now...86 is fine...96=ok for now---> 84 in 4/20.  --  HTN with need for checks at home before increase Exforge to max dose as of 2/18 OV... BP stable  off HCTZ and on toprol...required increase exforge per Karyn; BP great 4/19 OV despite back on Naproxen past week---> stable 4/21.  --  SYNCOPE 2/18 and to ER for 5 hrs; had neg V/Q; did have aura, so most c/w vasovagal; exam neg; will get ECHO/Dopplers/tilt to complete w/u...ECHO is as below, the Carotids 3/18 were neg, and TILT + for NEUROCARDIOGENIC SYNCOPE=will try to lose HCTZ and add in low dose of toprol; pt to monitor BP and call with results; d/w keep hydrated/heed warning signs...c/o dizziness with rapid head mvmt and rec eval per ENT now...got better on it's own.  ASCENDING AORTIC DILATATION=3.8 cm per ECHO 3/18 (d/w nl 3.5 or less, dilated 3.5 to 4.5, aneurysmal is above 4.4, and surgery above 5.5).  --  DJD with L knee/shoulder requiring MRI's...increased sxs 5/18, so holding statin...c/o knee again 12/18, but doesn't sound severe; OTC meds for now...I was unaware of bump in BP, so I started Naprosyn back a week ago...on prednisone as of 6/19 OV with Dr Mcgovern following now...ESR down 86 to 9 on prednisone as of 9/19 OV...still being weaned as of 12/19 OV and I d/w add tylenol to help offset the prednisone wean.  --  H PYLORI +...d/w tx plan with prilosec 20 mg/amoxil 1000mg/biaxin 500 mg all bid for 14 days...tolerated this.  GERD/BURPPING was better when he was on PPI for above, so try qod...no c/o 10/17.  GALLSTONES noted per below eval and will eval if persistent.  R ABD PAIN to ER, was nauseated, CT=? liver cysts and L renal lesion, he's concerned, so will get MRI 2/16... MRI 3/17 is with stable cysts.   --  BPH with intol to cardura prior, slow stream but no other sx's...defer for now.   --  --  PSA 2.8 (3/26/2024)  COLON 8/22 = Cologuard negative  PNEUMOVAX #1 12/13, Prevnar '16; rec Hep A 5/18.  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    Follow Up   Return for Next scheduled follow up.  Patient was given instructions and counseling regarding his condition or for health maintenance advice. Please see specific information pulled into the AVS if appropriate.

## 2024-05-30 ENCOUNTER — TELEPHONE (OUTPATIENT)
Dept: INTERNAL MEDICINE | Age: 74
End: 2024-05-30
Payer: MEDICARE

## 2024-05-30 LAB
TESTOST FREE SERPL-MCNC: 3.8 PG/ML (ref 6.6–18.1)
TESTOST SERPL-MCNC: 400 NG/DL (ref 264–916)

## 2024-06-04 ENCOUNTER — HOSPITAL ENCOUNTER (OUTPATIENT)
Dept: CT IMAGING | Facility: HOSPITAL | Age: 74
Discharge: HOME | End: 2024-06-04

## 2024-06-04 DIAGNOSIS — E11.9 TYPE 2 DIABETES MELLITUS WITHOUT COMPLICATION, WITHOUT LONG-TERM CURRENT USE OF INSULIN: ICD-10-CM

## 2024-06-04 DIAGNOSIS — R06.09 DYSPNEA ON EXERTION: ICD-10-CM

## 2024-06-04 PROCEDURE — 75571 CT HRT W/O DYE W/CA TEST: CPT

## 2024-07-12 ENCOUNTER — OFFICE VISIT (OUTPATIENT)
Dept: INTERNAL MEDICINE | Age: 74
End: 2024-07-12
Payer: MEDICARE

## 2024-07-12 VITALS
TEMPERATURE: 98.2 F | HEART RATE: 82 BPM | HEIGHT: 77 IN | SYSTOLIC BLOOD PRESSURE: 136 MMHG | OXYGEN SATURATION: 96 % | BODY MASS INDEX: 30.94 KG/M2 | WEIGHT: 262 LBS | DIASTOLIC BLOOD PRESSURE: 82 MMHG

## 2024-07-12 DIAGNOSIS — I10 ESSENTIAL HYPERTENSION: ICD-10-CM

## 2024-07-12 DIAGNOSIS — L30.9 DERMATITIS: Primary | ICD-10-CM

## 2024-07-12 PROCEDURE — 99213 OFFICE O/P EST LOW 20 MIN: CPT | Performed by: INTERNAL MEDICINE

## 2024-07-12 PROCEDURE — 1160F RVW MEDS BY RX/DR IN RCRD: CPT | Performed by: INTERNAL MEDICINE

## 2024-07-12 PROCEDURE — 1159F MED LIST DOCD IN RCRD: CPT | Performed by: INTERNAL MEDICINE

## 2024-07-12 PROCEDURE — 3075F SYST BP GE 130 - 139MM HG: CPT | Performed by: INTERNAL MEDICINE

## 2024-07-12 PROCEDURE — 3079F DIAST BP 80-89 MM HG: CPT | Performed by: INTERNAL MEDICINE

## 2024-07-12 RX ORDER — TRIAMCINOLONE ACETONIDE 5 MG/G
1 CREAM TOPICAL 3 TIMES DAILY
Qty: 30 G | Refills: 1 | Status: SHIPPED | OUTPATIENT
Start: 2024-07-12

## 2024-07-12 NOTE — PROGRESS NOTES
"Chief Complaint  Rash (Rash on inner thighs, redness and itching for several days. Pt states using otc ointment on it and alcohol. Pt states fatigue. )    Subjective          Marcio Enrique presents to [facility]     History of Present Illness:  73-year-old male with underlying hypertension, hyperlipidemia, diabetes, who is coming in 3/24 for routine 4-month follow-up.  We will go over his meds, review his labs in detail, and address any new concerns he may have.---> Patient being seen in 7/24 for new issue as per chief complaint above = rash.    Review of Systems   Constitutional:  Negative for appetite change, fatigue and fever.   HENT:  Negative for congestion and ear pain.    Eyes:  Negative for blurred vision.   Respiratory:  Negative for cough, chest tightness, shortness of breath and wheezing.    Cardiovascular:  Negative for chest pain, palpitations and leg swelling.   Gastrointestinal:  Negative for abdominal pain.   Genitourinary:  Negative for difficulty urinating, dysuria and hematuria.   Musculoskeletal:  Negative for arthralgias and gait problem.   Skin:  Negative for skin lesions.   Neurological:  Negative for syncope, memory problem and confusion.   Psychiatric/Behavioral:  Negative for self-injury and depressed mood.        Objective   Vital Signs:   /82   Pulse 82   Temp 98.2 °F (36.8 °C)   Ht 195.6 cm (77.01\")   Wt 119 kg (262 lb)   SpO2 96%   BMI 31.06 kg/m²           Physical Exam  Vitals and nursing note reviewed.   Constitutional:       General: He is not in acute distress.     Appearance: Normal appearance. He is not toxic-appearing.   HENT:      Head: Atraumatic.      Right Ear: External ear normal.      Left Ear: External ear normal.      Nose: Nose normal.      Mouth/Throat:      Mouth: Mucous membranes are moist.   Eyes:      General:         Right eye: No discharge.         Left eye: No discharge.      Extraocular Movements: Extraocular " movements intact.      Pupils: Pupils are equal, round, and reactive to light.   Cardiovascular:      Rate and Rhythm: Normal rate and regular rhythm.      Pulses: Normal pulses.      Heart sounds: Normal heart sounds. No murmur heard.     No gallop.   Pulmonary:      Effort: Pulmonary effort is normal. No respiratory distress.      Breath sounds: No wheezing, rhonchi or rales.   Abdominal:      General: There is no distension.      Palpations: Abdomen is soft. There is no mass.      Tenderness: There is no abdominal tenderness. There is no guarding.   Musculoskeletal:         General: No swelling or tenderness.      Cervical back: No tenderness.      Right lower leg: No edema.      Left lower leg: No edema.   Skin:     General: Skin is warm and dry.      Findings: No rash.   Neurological:      General: No focal deficit present.      Mental Status: He is alert and oriented to person, place, and time. Mental status is at baseline.      Motor: No weakness.      Gait: Gait normal.   Psychiatric:         Mood and Affect: Mood normal.         Thought Content: Thought content normal.          Result Review :   The following data was reviewed by: Aleksandr Olmstead MD on 08/23/2021:                  Assessment and Plan    Diagnoses and all orders for this visit:    1. Dermatitis (Primary)  Assessment & Plan:  Patient being seen on 7/24 for rash of the inner thighs, right greater than left.  It is very pruritic, he has got it inflamed somewhat in the right inner thigh region.  No bites that he knows of, did get some new close, no wheezing, no swelling issues etc.    Unknown etiology, treat per orders.      2. Essential hypertension  Assessment & Plan:  Blood pressure remains well-controlled and his pulse is in the low 80s as of his 7/24 urgent visit.  He will continue full doses of amlodipine and valsartan as well as just low-dose of doxazosin and metoprolol.      Other orders  -     triamcinolone (KENALOG) 0.5 % cream; Apply 1  Application topically to the appropriate area as directed 3 (Three) Times a Day.  Dispense: 30 g; Refill: 1            --  --  Older notes:  VISIT 4/21:   ANNUAL MEDICARE WELLNESS EXAM 12/20 = reviewed all forms with pt; no new discoveries.  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  --  DM stable on only 500 qd and that is fine...6.3...6.9 and I d/w bad trend...6.9 is holding on 500 qd only... 7.4 and rec 1000 qd again...7.0...7.4 is due to the steroids, and they are being weaned, so no med changes made 9/19...6.9 is back down off steroids...7.5 and will increase on RTO if same/higher = only on 500 qd recall...6.9 on 1000 qAM as of 8/20 OV---> 7.1 is fine 4/21. (TSH neg 4/21).  --  LIPIDS stable 3/14 w/o treatment...elevated CK without statins anyhow...defer... holding steady...122 and will add statin now...78, but CK up and c/o joints at 5/18 OV, so will hold Pravastatin 10 mg for now...86 is fine...96=ok for now---> 84 in 4/20.  --  HTN with need for checks at home before increase Exforge to max dose as of 2/18 OV... BP stable off HCTZ and on toprol...required increase exforge per Karyn; BP great 4/19 OV despite back on Naproxen past week---> stable 4/21.  --  SYNCOPE 2/18 and to ER for 5 hrs; had neg V/Q; did have aura, so most c/w vasovagal; exam neg; will get ECHO/Dopplers/tilt to complete w/u...ECHO is as below, the Carotids 3/18 were neg, and TILT + for NEUROCARDIOGENIC SYNCOPE=will try to lose HCTZ and add in low dose of toprol; pt to monitor BP and call with results; d/w keep hydrated/heed warning signs...c/o dizziness with rapid head mvmt and rec eval per ENT now...got  better on it's own.  ASCENDING AORTIC DILATATION=3.8 cm per ECHO 3/18 (d/w nl 3.5 or less, dilated 3.5 to 4.5, aneurysmal is above 4.4, and surgery above 5.5).  --  DJD with L knee/shoulder requiring MRI's...increased sxs 5/18, so holding statin...c/o knee again 12/18, but doesn't sound severe; OTC meds for now...I was unaware of bump in BP, so I started Naprosyn back a week ago...on prednisone as of 6/19 OV with Dr Mcgovern following now...ESR down 86 to 9 on prednisone as of 9/19 OV...still being weaned as of 12/19 OV and I d/w add tylenol to help offset the prednisone wean.  --  H PYLORI +...d/w tx plan with prilosec 20 mg/amoxil 1000mg/biaxin 500 mg all bid for 14 days...tolerated this.  GERD/BURPPING was better when he was on PPI for above, so try qod...no c/o 10/17.  GALLSTONES noted per below eval and will eval if persistent.  R ABD PAIN to ER, was nauseated, CT=? liver cysts and L renal lesion, he's concerned, so will get MRI 2/16... MRI 3/17 is with stable cysts.   --  BPH with intol to cardura prior, slow stream but no other sx's...defer for now.   --  --  PSA 2.8 (3/26/2024)  COLON 8/22 = Cologuard negative  PNEUMOVAX #1 12/13, Prevnar '16; rec Hep A 5/18.  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    Follow Up   Return for Next scheduled follow up.  Patient was given instructions and counseling regarding his condition or for health maintenance advice. Please see specific information pulled into the AVS if appropriate.

## 2024-07-12 NOTE — ASSESSMENT & PLAN NOTE
Patient being seen on 7/24 for rash of the inner thighs, right greater than left.  It is very pruritic, he has got it inflamed somewhat in the right inner thigh region.  No bites that he knows of, did get some new close, no wheezing, no swelling issues etc.    Unknown etiology, treat per orders.

## 2024-07-12 NOTE — ASSESSMENT & PLAN NOTE
Blood pressure remains well-controlled and his pulse is in the low 80s as of his 7/24 urgent visit.  He will continue full doses of amlodipine and valsartan as well as just low-dose of doxazosin and metoprolol.

## 2024-07-17 RX ORDER — AMLODIPINE AND VALSARTAN 10; 320 MG/1; MG/1
1 TABLET ORAL DAILY
Qty: 90 TABLET | Refills: 1 | Status: SHIPPED | OUTPATIENT
Start: 2024-07-17

## 2024-07-17 NOTE — TELEPHONE ENCOUNTER
Rx Refill Note  Requested Prescriptions     Pending Prescriptions Disp Refills    amLODIPine-valsartan (EXFORGE)  MG per tablet [Pharmacy Med Name: AMLODIPINE-VALSARTAN  MG] 90 tablet 1     Sig: TAKE 1 TABLET BY MOUTH EVERY DAY      Last office visit with prescribing clinician: 7/12/2024   Last telemedicine visit with prescribing clinician: Visit date not found   Next office visit with prescribing clinician: 7/29/2024                         Would you like a call back once the refill request has been completed: [] Yes [] No    If the office needs to give you a call back, can they leave a voicemail: [] Yes [] No    Anusha Giron MA  07/17/24, 08:37 EDT

## 2024-07-22 RX ORDER — PRAVASTATIN SODIUM 20 MG
20 TABLET ORAL DAILY
Qty: 90 TABLET | Refills: 1 | Status: SHIPPED | OUTPATIENT
Start: 2024-07-22

## 2024-07-22 RX ORDER — METOPROLOL SUCCINATE 25 MG/1
12.5 TABLET, EXTENDED RELEASE ORAL DAILY
Qty: 45 TABLET | Refills: 1 | Status: SHIPPED | OUTPATIENT
Start: 2024-07-22

## 2024-07-24 ENCOUNTER — LAB (OUTPATIENT)
Dept: INTERNAL MEDICINE | Age: 74
End: 2024-07-24
Payer: MEDICARE

## 2024-07-24 DIAGNOSIS — D50.8 IRON DEFICIENCY ANEMIA SECONDARY TO INADEQUATE DIETARY IRON INTAKE: ICD-10-CM

## 2024-07-24 LAB
BASOPHILS # BLD AUTO: 0.02 10*3/MM3 (ref 0–0.2)
BASOPHILS NFR BLD AUTO: 0.4 % (ref 0–1.5)
DEPRECATED RDW RBC AUTO: 39.9 FL (ref 37–54)
EOSINOPHIL # BLD AUTO: 0.24 10*3/MM3 (ref 0–0.4)
EOSINOPHIL NFR BLD AUTO: 4.4 % (ref 0.3–6.2)
ERYTHROCYTE [DISTWIDTH] IN BLOOD BY AUTOMATED COUNT: 12.3 % (ref 12.3–15.4)
HCT VFR BLD AUTO: 37.5 % (ref 37.5–51)
HGB BLD-MCNC: 12.6 G/DL (ref 13–17.7)
IMM GRANULOCYTES # BLD AUTO: 0.02 10*3/MM3 (ref 0–0.05)
IMM GRANULOCYTES NFR BLD AUTO: 0.4 % (ref 0–0.5)
LYMPHOCYTES # BLD AUTO: 1.99 10*3/MM3 (ref 0.7–3.1)
LYMPHOCYTES NFR BLD AUTO: 36.4 % (ref 19.6–45.3)
MCH RBC QN AUTO: 30 PG (ref 26.6–33)
MCHC RBC AUTO-ENTMCNC: 33.6 G/DL (ref 31.5–35.7)
MCV RBC AUTO: 89.3 FL (ref 79–97)
MONOCYTES # BLD AUTO: 0.47 10*3/MM3 (ref 0.1–0.9)
MONOCYTES NFR BLD AUTO: 8.6 % (ref 5–12)
NEUTROPHILS NFR BLD AUTO: 2.73 10*3/MM3 (ref 1.7–7)
NEUTROPHILS NFR BLD AUTO: 49.8 % (ref 42.7–76)
NRBC BLD AUTO-RTO: 0 /100 WBC (ref 0–0.2)
PLATELET # BLD AUTO: 238 10*3/MM3 (ref 140–450)
PMV BLD AUTO: 10.1 FL (ref 6–12)
RBC # BLD AUTO: 4.2 10*6/MM3 (ref 4.14–5.8)
WBC NRBC COR # BLD AUTO: 5.47 10*3/MM3 (ref 3.4–10.8)

## 2024-07-24 PROCEDURE — 85025 COMPLETE CBC W/AUTO DIFF WBC: CPT | Performed by: INTERNAL MEDICINE

## 2024-07-24 PROCEDURE — 36415 COLL VENOUS BLD VENIPUNCTURE: CPT | Performed by: INTERNAL MEDICINE

## 2024-07-24 RX ORDER — OMEPRAZOLE 40 MG/1
40 CAPSULE, DELAYED RELEASE ORAL 2 TIMES DAILY
Qty: 180 CAPSULE | Refills: 1 | Status: SHIPPED | OUTPATIENT
Start: 2024-07-24

## 2024-07-26 RX ORDER — MELOXICAM 15 MG/1
15 TABLET ORAL DAILY
Qty: 30 TABLET | Refills: 2 | Status: SHIPPED | OUTPATIENT
Start: 2024-07-26

## 2024-07-29 ENCOUNTER — OFFICE VISIT (OUTPATIENT)
Dept: INTERNAL MEDICINE | Age: 74
End: 2024-07-29
Payer: MEDICARE

## 2024-07-29 VITALS
BODY MASS INDEX: 30.7 KG/M2 | HEART RATE: 58 BPM | SYSTOLIC BLOOD PRESSURE: 138 MMHG | WEIGHT: 260 LBS | HEIGHT: 77 IN | DIASTOLIC BLOOD PRESSURE: 84 MMHG | TEMPERATURE: 98.2 F | OXYGEN SATURATION: 96 %

## 2024-07-29 DIAGNOSIS — E78.2 MIXED HYPERLIPIDEMIA: ICD-10-CM

## 2024-07-29 DIAGNOSIS — D50.8 IRON DEFICIENCY ANEMIA SECONDARY TO INADEQUATE DIETARY IRON INTAKE: ICD-10-CM

## 2024-07-29 DIAGNOSIS — E11.9 TYPE 2 DIABETES MELLITUS WITHOUT COMPLICATION, WITHOUT LONG-TERM CURRENT USE OF INSULIN: Primary | ICD-10-CM

## 2024-07-29 DIAGNOSIS — I10 ESSENTIAL HYPERTENSION: ICD-10-CM

## 2024-07-29 DIAGNOSIS — R06.09 DYSPNEA ON EXERTION: ICD-10-CM

## 2024-07-29 PROBLEM — L30.9 DERMATITIS: Status: RESOLVED | Noted: 2024-07-12 | Resolved: 2024-07-29

## 2024-07-29 LAB
ALBUMIN UR-MCNC: 6.4 MG/DL
CREAT UR-MCNC: 294.6 MG/DL
MICROALBUMIN/CREAT UR: 21.7 MG/G (ref 0–29)

## 2024-07-29 PROCEDURE — 1159F MED LIST DOCD IN RCRD: CPT | Performed by: INTERNAL MEDICINE

## 2024-07-29 PROCEDURE — 99214 OFFICE O/P EST MOD 30 MIN: CPT | Performed by: INTERNAL MEDICINE

## 2024-07-29 PROCEDURE — 3079F DIAST BP 80-89 MM HG: CPT | Performed by: INTERNAL MEDICINE

## 2024-07-29 PROCEDURE — 3075F SYST BP GE 130 - 139MM HG: CPT | Performed by: INTERNAL MEDICINE

## 2024-07-29 PROCEDURE — 82043 UR ALBUMIN QUANTITATIVE: CPT | Performed by: INTERNAL MEDICINE

## 2024-07-29 PROCEDURE — 82570 ASSAY OF URINE CREATININE: CPT | Performed by: INTERNAL MEDICINE

## 2024-07-29 PROCEDURE — 1160F RVW MEDS BY RX/DR IN RCRD: CPT | Performed by: INTERNAL MEDICINE

## 2024-07-29 NOTE — ASSESSMENT & PLAN NOTE
A1c is only 6.5 as of his 7/24 OV.  He is just on low to moderate dose metformin, so certainly appropriate to continue same.

## 2024-07-29 NOTE — ASSESSMENT & PLAN NOTE
Hemoglobin stable at 12.6 as of his 7/24 OV, on no iron supplementation, although he is holding off on blood donations presently.  Will have him go ahead and take over-the-counter iron 3 days a week, repeat levels here in the fall.

## 2024-07-29 NOTE — PROGRESS NOTES
"Chief Complaint  Diabetes (Routine follow up, Lab follow up. Fatigue and low energy, pt states some feeling of nausea. )    Subjective          Marcio Enrique presents to [facility]     History of Present Illness:  73-year-old male with underlying hypertension, hyperlipidemia, diabetes, who is coming in 7/24 for routine 4-month follow-up.  We will go over his meds, review his labs in detail, and address any new concerns he may have.    Review of Systems   Constitutional:  Negative for appetite change, fatigue and fever.   HENT:  Negative for congestion and ear pain.    Eyes:  Negative for blurred vision.   Respiratory:  Negative for cough, chest tightness, shortness of breath and wheezing.    Cardiovascular:  Negative for chest pain, palpitations and leg swelling.   Gastrointestinal:  Negative for abdominal pain.   Genitourinary:  Negative for difficulty urinating, dysuria and hematuria.   Musculoskeletal:  Negative for arthralgias and gait problem.   Skin:  Negative for skin lesions.   Neurological:  Negative for syncope, memory problem and confusion.   Psychiatric/Behavioral:  Negative for self-injury and depressed mood.        Objective   Vital Signs:   /84   Pulse 58   Temp 98.2 °F (36.8 °C)   Ht 195.6 cm (77.01\")   Wt 118 kg (260 lb)   SpO2 96%   BMI 30.83 kg/m²           Physical Exam  Vitals and nursing note reviewed.   Constitutional:       General: He is not in acute distress.     Appearance: Normal appearance. He is not toxic-appearing.   HENT:      Head: Atraumatic.      Right Ear: External ear normal.      Left Ear: External ear normal.      Nose: Nose normal.      Mouth/Throat:      Mouth: Mucous membranes are moist.   Eyes:      General:         Right eye: No discharge.         Left eye: No discharge.      Extraocular Movements: Extraocular movements intact.      Pupils: Pupils are equal, round, and reactive to light.   Cardiovascular:      Rate and Rhythm: " Normal rate and regular rhythm.      Pulses: Normal pulses.      Heart sounds: Normal heart sounds. No murmur heard.     No gallop.   Pulmonary:      Effort: Pulmonary effort is normal. No respiratory distress.      Breath sounds: No wheezing, rhonchi or rales.   Abdominal:      General: There is no distension.      Palpations: Abdomen is soft. There is no mass.      Tenderness: There is no abdominal tenderness. There is no guarding.   Musculoskeletal:         General: No swelling or tenderness.      Cervical back: No tenderness.      Right lower leg: No edema.      Left lower leg: No edema.   Skin:     General: Skin is warm and dry.      Findings: No rash.   Neurological:      General: No focal deficit present.      Mental Status: He is alert and oriented to person, place, and time. Mental status is at baseline.      Motor: No weakness.      Gait: Gait normal.   Psychiatric:         Mood and Affect: Mood normal.         Thought Content: Thought content normal.          Result Review :   The following data was reviewed by: Aleksandr Olmstead MD on 08/23/2021:                  Assessment and Plan    Diagnoses and all orders for this visit:    1. Type 2 diabetes mellitus without complication, without long-term current use of insulin (Primary)  Assessment & Plan:  A1c is only 6.5 as of his 7/24 OV.  He is just on low to moderate dose metformin, so certainly appropriate to continue same.    Orders:  -     Microalbumin / Creatinine Urine Ratio - Urine, Clean Catch; Future  -     Hemoglobin A1c; Future    2. Essential hypertension  Assessment & Plan:  Blood pressure is well-controlled and his pulse is right around 60 as of his 7/24 OV.  He can continue with full doses of amlodipine/valsartan as well as low doses of doxazosin and metoprolol.    Orders:  -     Basic Metabolic Panel; Future    3. Mixed hyperlipidemia  Overview:  CT calcium score 6/24:  Left main (LM): 0  Left anterior descending (LAD): 1  Left circumflex (CX):  12  Right coronary artery (RCA): 0  Total 13    Assessment & Plan:  LDL is very stable at 69 as of his 7/24 OV.  He is just on low to moderate dose pravastatin so stable to continue same.      4. Iron deficiency anemia secondary to inadequate dietary iron intake  Assessment & Plan:  Hemoglobin stable at 12.6 as of his 7/24 OV, on no iron supplementation, although he is holding off on blood donations presently.  Will have him go ahead and take over-the-counter iron 3 days a week, repeat levels here in the fall.    Orders:  -     CBC & Differential; Future  -     Iron Profile; Future  -     Ferritin; Future    5. Dyspnea on exertion  Overview:  PFTs 4/22:    Spirometry:  No obstructive lung defect noted.  FEV1 is 3.86 L / 101% predicted.  FVC is 4.88 L / 97% predicted.  No bronchodilator response was noted.  Flow volume loop within normal limits.     Lung volumes:  No evidence of restriction, hyperinflation or air trapping.     Diffusion capacity:  Diffuse capacity with normal limits at 82% predicted.     Overall impression:  No evidence of obstruction or restriction.  No bronchodilator response was noted.  Diffuse capacity within normal limits.    Assessment & Plan:  CT calcium score was well normal, and the lungs were clear per his 6/24 chest CT.                --  --  Older notes:  VISIT 4/21:   ANNUAL MEDICARE WELLNESS EXAM 12/20 = reviewed all forms with pt; no new discoveries.  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  --  DM stable on only 500 qd and that is fine...6.3...6.9 and I d/w bad trend...6.9 is holding on 500 qd only... 7.4 and rec 1000 qd again...7.0...7.4 is due to the  steroids, and they are being weaned, so no med changes made 9/19...6.9 is back down off steroids...7.5 and will increase on RTO if same/higher = only on 500 qd recall...6.9 on 1000 qAM as of 8/20 OV---> 7.1 is fine 4/21. (TSH neg 4/21).  --  LIPIDS stable 3/14 w/o treatment...elevated CK without statins anyhow...defer... holding steady...122 and will add statin now...78, but CK up and c/o joints at 5/18 OV, so will hold Pravastatin 10 mg for now...86 is fine...96=ok for now---> 84 in 4/20.  --  HTN with need for checks at home before increase Exforge to max dose as of 2/18 OV... BP stable off HCTZ and on toprol...required increase exforge per Karyn; BP great 4/19 OV despite back on Naproxen past week---> stable 4/21.  --  SYNCOPE 2/18 and to ER for 5 hrs; had neg V/Q; did have aura, so most c/w vasovagal; exam neg; will get ECHO/Dopplers/tilt to complete w/u...ECHO is as below, the Carotids 3/18 were neg, and TILT + for NEUROCARDIOGENIC SYNCOPE=will try to lose HCTZ and add in low dose of toprol; pt to monitor BP and call with results; d/w keep hydrated/heed warning signs...c/o dizziness with rapid head mvmt and rec eval per ENT now...got better on it's own.  ASCENDING AORTIC DILATATION=3.8 cm per ECHO 3/18 (d/w nl 3.5 or less, dilated 3.5 to 4.5, aneurysmal is above 4.4, and surgery above 5.5).  --  DJD with L knee/shoulder requiring MRI's...increased sxs 5/18, so holding statin...c/o knee again 12/18, but doesn't sound severe; OTC meds for now...I was unaware of bump in BP, so I started Naprosyn back a week ago...on prednisone as of 6/19 OV with Dr Mcgovern following now...ESR down 86 to 9 on prednisone as of 9/19 OV...still being weaned as of 12/19 OV and I d/w add tylenol to help offset the prednisone wean.  --  H PYLORI +...d/w tx plan with prilosec 20 mg/amoxil 1000mg/biaxin 500 mg all bid for 14 days...tolerated this.  GERD/BURPPING was better when he was on PPI for above, so try qod...no c/o  10/17.  GALLSTONES noted per below eval and will eval if persistent.  R ABD PAIN to ER, was nauseated, CT=? liver cysts and L renal lesion, he's concerned, so will get MRI 2/16... MRI 3/17 is with stable cysts.   --  BPH with intol to cardura prior, slow stream but no other sx's...defer for now.   --  --  PSA 2.8 (3/26/2024)  COLON 8/22 = Cologuard negative  PNEUMOVAX #1 12/13, Prevnar '16; rec Hep A 5/18.  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    Follow Up   Return in about 3 months (around 10/29/2024).  Patient was given instructions and counseling regarding his condition or for health maintenance advice. Please see specific information pulled into the AVS if appropriate.

## 2024-07-29 NOTE — PATIENT INSTRUCTIONS
1.  Take ferrous sulfate 325 mg total or 65 mg of elemental iron, every Monday Wednesday and Friday.    2.  Also take vitamin C 500 mg at the same time you take the iron.

## 2024-07-29 NOTE — ASSESSMENT & PLAN NOTE
Blood pressure is well-controlled and his pulse is right around 60 as of his 7/24 OV.  He can continue with full doses of amlodipine/valsartan as well as low doses of doxazosin and metoprolol.

## 2024-07-29 NOTE — ASSESSMENT & PLAN NOTE
LDL is very stable at 69 as of his 7/24 OV.  He is just on low to moderate dose pravastatin so stable to continue same.

## 2024-08-03 ENCOUNTER — HOSPITAL ENCOUNTER (EMERGENCY)
Facility: HOSPITAL | Age: 74
Discharge: HOME | End: 2024-08-03
Attending: EMERGENCY MEDICINE
Payer: MEDICARE

## 2024-08-03 VITALS
RESPIRATION RATE: 18 BRPM | OXYGEN SATURATION: 98 % | TEMPERATURE: 98.8 F | BODY MASS INDEX: 30.9 KG/M2 | SYSTOLIC BLOOD PRESSURE: 163 MMHG | WEIGHT: 261.69 LBS | HEART RATE: 58 BPM | DIASTOLIC BLOOD PRESSURE: 101 MMHG | HEIGHT: 77 IN

## 2024-08-03 DIAGNOSIS — H57.12 LEFT EYE PAIN: Primary | ICD-10-CM

## 2024-08-03 DIAGNOSIS — S05.02XA ABRASION OF LEFT CORNEA, INITIAL ENCOUNTER: ICD-10-CM

## 2024-08-03 PROCEDURE — 99283 EMERGENCY DEPT VISIT LOW MDM: CPT

## 2024-08-03 RX ORDER — DICLOFENAC SODIUM 1 MG/ML
1 SOLUTION/ DROPS OPHTHALMIC 4 TIMES DAILY
Qty: 2.5 ML | Refills: 0 | Status: SHIPPED | OUTPATIENT
Start: 2024-08-03

## 2024-08-03 RX ORDER — PROPARACAINE HYDROCHLORIDE 5 MG/ML
1 SOLUTION/ DROPS OPHTHALMIC ONCE
Status: COMPLETED | OUTPATIENT
Start: 2024-08-03 | End: 2024-08-03

## 2024-08-03 RX ADMIN — PROPARACAINE HYDROCHLORIDE 1 DROP: 5 SOLUTION/ DROPS OPHTHALMIC at 16:27

## 2024-08-03 NOTE — ED PROVIDER NOTES
Time: 4:15 PM EDT  Date of encounter:  8/3/2024  Room number: 56/56  Independent Historian/Clinical History and Information was obtained by:   Patient    History is limited by: N/A    Chief Complaint: eye complaint       History of Present Illness:  Patient is a 74 y.o. year old male who presents to the emergency department for evaluation of left eye pain. Pt reports he has had a pain in his left eye for 2-3 days that worsens when he blinks. He was seen earlier at local [facility] [place] where he was prescribed antibiotic drops.  He denies any known foreign body, has had no change in vision, and has no photophobia.  He does wear glasses.  Patient also confirms that he has an ophthalmologist that he can follow-up with.    HPI    Patient Care Team  Primary Care Provider: Aleksandr Olmstead MD    Past Medical History:     No Known Allergies  Past Medical History:   Diagnosis Date    Acid reflux     Anesthesia     FAMILY AND Pt REPORT Pt WAS COMBATIVE AFTER DENTAL PROCEDURE    Dizziness     DJD (degenerative joint disease)     LEFT SHOULDER; LEFT KNEE= MRI    Essential (primary) hypertension     Gastro-esophageal reflux disease without esophagitis     High cholesterol     HTN (hypertension)     Iron deficiency anemia, unspecified     Left knee pain     Mixed hyperlipidemia     Neuropathy     Other fatigue     Partial tear of left rotator cuff     PUD (peptic ulcer disease)     Pure hypercholesterolemia     INCREASED CK WITH ALL    SOB (shortness of breath) 2021    HAD ECHO/ STRESS NO PROBLEMS SINCE THEN. NO CARDIOLOGIST- DENIED CP/SOB    Type 2 diabetes mellitus without complication     ON MEDS- DOESN'T CHECK BG DAILY AT HOME    Unspecified osteoarthritis, unspecified site      Past Surgical History:   Procedure Laterality Date    CIRCUMCISION      KNEE ACL RECONSTRUCTION      left    KNEE SURGERY Left     CLEAN     TOOTH EXTRACTION      TOTAL KNEE ARTHROPLASTY Left 10/24/2023    Procedure: LEFT TOTAL KNEE ARTHROPLASTY WITH DAVID ROBOT;   Surgeon: Gurinder Figueroa MD;  Location: [facility] MAIN OR;  Service: Robotics - Ortho;  Laterality: Left;     Family History   Problem Relation Age of Onset    Colon cancer Other         X2 HE BELIEVES    Malig Hyperthermia Neg Hx        Home Medications:  Prior to Admission medications    Medication Sig Start Date End Date Taking? Authorizing Provider   amLODIPine-valsartan (EXFORGE)  MG per tablet TAKE 1 TABLET BY MOUTH EVERY DAY 7/17/24   Aleksandr Olmstead MD   Blood Glucose Calibration (Accu-Chek Guide Control) liquid 1 each by In Vitro route Daily As Needed (machine needing controled). 1/6/23   Aleksandr Olmstead MD   cetirizine (zyrTEC) 10 MG tablet Take 1 tablet by mouth Daily. 12/6/23   Aleksandr Olmstead MD   doxazosin (CARDURA) 2 MG tablet Take 1 tablet by mouth Every Night. 12/6/23   Aleksandr Olmstead MD   meloxicam (MOBIC) 15 MG tablet TAKE 1 TABLET BY MOUTH EVERY DAY 7/26/24   Mariposa Sawant PA-C   metFORMIN ER (GLUCOPHAGE-XR) 500 MG 24 hr tablet Take 2 tablets by mouth Daily With Breakfast. 12/6/23   Aleksandr Olmstead MD   metoprolol succinate XL (TOPROL-XL) 25 MG 24 hr tablet TAKE 1/2 TABLET BY MOUTH DAILY 7/22/24   Aleksandr Olmstead MD   multivitamin with minerals tablet tablet Take 1 tablet by mouth Daily.    Provider, MD Kostas   omeprazole (priLOSEC) 40 MG capsule TAKE 1 CAPSULE BY MOUTH TWICE A DAY 7/24/24   Aleksandr Olmstead MD   pravastatin (PRAVACHOL) 20 MG tablet TAKE 1 TABLET BY MOUTH EVERY DAY 7/22/24   Aleksandr Olmstead MD   sertraline (ZOLOFT) 50 MG tablet TAKE 1 TABLET BY MOUTH EVERY DAY 7/1/24   Aleksandr Olmstead MD   tobramycin 0.3 % solution ophthalmic solution Administer 2 drops into the left eye Every 4 (Four) Hours for 7 days. 8/3/24 8/10/24  Jeramie Hunter PA   traZODone (DESYREL) 50 MG tablet Take 2 tablets by mouth Every Night. 12/6/23   Aleksandr Olmstead MD   triamcinolone (KENALOG) 0.5 % cream Apply 1 Application topically to the appropriate area as directed 3 (Three) Times a Day. 7/12/24    "Aleksandr Olmstead MD        Social History:   Social History     Tobacco Use    Smoking status: Never    Smokeless tobacco: Never   Vaping Use    Vaping status: Never Used   Substance Use Topics    Alcohol use: No    Drug use: No         Review of Systems:  Review of Systems   Constitutional:  Negative for chills and fever.   HENT:  Positive for ear pain. Negative for congestion and sore throat.    Eyes:  Negative for pain.   Respiratory:  Negative for cough, chest tightness and shortness of breath.    Cardiovascular:  Negative for chest pain.   Gastrointestinal:  Negative for abdominal pain, diarrhea, nausea and vomiting.   Genitourinary:  Negative for flank pain and hematuria.   Musculoskeletal:  Negative for joint swelling.   Skin:  Negative for pallor.   Neurological:  Negative for seizures and headaches.   All other systems reviewed and are negative.       Physical Exam:  BP (!) 163/101 (BP Location: Right arm, Patient Position: Sitting)   Pulse 58   Temp 98.8 °F (37.1 °C) (Oral)   Resp 18   Ht 195.6 cm (77\")   Wt 119 kg (261 lb 11 oz)   SpO2 98%   BMI 31.03 kg/m²     Physical Exam  Vitals and nursing note reviewed.   Constitutional:       General: He is not in acute distress.     Appearance: Normal appearance. He is not ill-appearing, toxic-appearing or diaphoretic.   HENT:      Head: Normocephalic and atraumatic.      Mouth/Throat:      Mouth: Mucous membranes are moist.   Eyes:      General: No scleral icterus.     Extraocular Movements: Extraocular movements intact.      Pupils: Pupils are equal, round, and reactive to light.     Cardiovascular:      Rate and Rhythm: Normal rate and regular rhythm.      Pulses: Normal pulses.      Heart sounds: Normal heart sounds.   Pulmonary:      Effort: Pulmonary effort is normal. No respiratory distress.      Breath sounds: Normal breath sounds.   Abdominal:      General: Abdomen is flat.      Palpations: Abdomen is soft.      Tenderness: There is no abdominal " tenderness.   Musculoskeletal:         General: Normal range of motion.      Cervical back: Normal range of motion and neck supple.   Skin:     General: Skin is warm and dry.   Neurological:      Mental Status: He is alert and oriented to person, place, and time. Mental status is at baseline.      Sensory: No sensory deficit.                  Procedures:  Procedures      Medical Decision Making:      Comorbidities that affect care:    Decreased vision requiring glasses, diabetes, neuropathy, peptic ulcer disease, DJD, iron urgency anemia, hypertension    External Notes reviewed:    Previous Clinic Note: I reviewed patient's encounter with the local urgent care center which occurred right before arriving to the emergency department.        The following orders were placed and all results were independently analyzed by me:  No orders of the defined types were placed in this encounter.      Medications Given in the Emergency Department:  Medications   proparacaine (ALCAINE) 0.5 % ophthalmic solution 1 drop (1 drop Right Eye Given 8/3/24 1627)        ED Course:    ED Course as of 08/03/24 1720   Sat Aug 03, 2024   1630 Patient did mow his lawn and recently used a circular saw however he does not feel that anything flew into his eye. [MS]   1707 Fluorescein stain/Woods lamp exam completed.  Questionable abrasion noted.  No obvious foreign body.  Upper lid, where patient complains of the most discomfort, was assessed with sterile Q-tip and there was no obvious foreign body or abnormality. [MS]      ED Course User Index  [MS] Estelita Griffin, NORA       Labs:    Lab Results (last 24 hours)       ** No results found for the last 24 hours. **             Imaging:    No Radiology Exams Resulted Within Past 24 Hours      Differential Diagnosis and Discussion:    Eye Pain/Blurred Vision: Differential diagnosis includes but is not limited to dacryocystitis, hordeolum, chalazion, periorbital cellulitis, cavernous sinus  thrombosis, blepharitis, and glaucoma.        [facility]               Patient Care Considerations:    I considered prescribing antibiotic eyedrop however patient was prescribed 1 earlier today by provider at local urgent care center      Consultants/Shared Management Plan:    None    Social Determinants of Health:    Patient is independent, reliable, and has access to care.       Disposition and Care Coordination:    Discharged: The patient is suitable and stable for discharge with no need for consideration of admission.    I have explained the patient´s condition, diagnoses and treatment plan based on the information available to me at this time. I have answered questions and addressed any concerns. The patient has a good  understanding of the patient´s diagnosis, condition, and treatment plan as can be expected at this point. The vital signs have been stable. The patient´s condition is stable and appropriate for discharge from the emergency department.      The patient will pursue further outpatient evaluation with the primary care physician or other designated or consulting physician as outlined in the discharge instructions. They are agreeable to this plan of care and follow-up instructions have been explained in detail. The patient has received these instructions in written format and has expressed an understanding of the discharge instructions. The patient is aware that any significant change in condition or worsening of symptoms should prompt an immediate return to this or the closest emergency department or call to 911.    Final diagnoses:   Left eye pain   Abrasion of left cornea, initial encounter        ED Disposition       ED Disposition   Discharge    Condition   Stable    Comment   --               This medical record created using voice recognition software.       Estelita Griffin, NORA  08/03/24 0948

## 2024-08-03 NOTE — DISCHARGE INSTRUCTIONS
Please continue to use the antibiotic drops that the urgent care has prescribed you.  I have also prescribed you a drop that is for pain.  Use that as directed.  It is very important however that you follow-up with your eye doctor, Dr. Che on Monday.  It anytime before then you develop a sudden change in vision in your left eye, experience a pressure sensation behind your eye, or develop the worst headache of your life please return to the emergency department.  Otherwise, follow-up with your ophthalmologist/optometrist on Monday.

## 2024-08-09 ENCOUNTER — TELEPHONE (OUTPATIENT)
Dept: INTERNAL MEDICINE | Age: 74
End: 2024-08-09
Payer: MEDICARE

## 2024-08-09 NOTE — TELEPHONE ENCOUNTER
Pt Is needing a statement that his Micro Albumin was completed and that it is good. He would like to pick this up on Monday.

## 2024-08-19 RX ORDER — METFORMIN HYDROCHLORIDE 500 MG/1
1000 TABLET, EXTENDED RELEASE ORAL
Qty: 180 TABLET | Refills: 1 | Status: SHIPPED | OUTPATIENT
Start: 2024-08-19

## 2024-08-19 RX ORDER — DOXAZOSIN 2 MG/1
2 TABLET ORAL
Qty: 90 TABLET | Refills: 1 | Status: SHIPPED | OUTPATIENT
Start: 2024-08-19

## 2024-08-19 RX ORDER — TRAZODONE HYDROCHLORIDE 50 MG/1
100 TABLET ORAL NIGHTLY
Qty: 180 TABLET | Refills: 1 | Status: SHIPPED | OUTPATIENT
Start: 2024-08-19

## 2024-10-22 ENCOUNTER — LAB (OUTPATIENT)
Dept: INTERNAL MEDICINE | Age: 74
End: 2024-10-22
Payer: MEDICARE

## 2024-10-22 DIAGNOSIS — E11.9 TYPE 2 DIABETES MELLITUS WITHOUT COMPLICATION, WITHOUT LONG-TERM CURRENT USE OF INSULIN: ICD-10-CM

## 2024-10-22 DIAGNOSIS — I10 ESSENTIAL HYPERTENSION: ICD-10-CM

## 2024-10-22 DIAGNOSIS — D50.8 IRON DEFICIENCY ANEMIA SECONDARY TO INADEQUATE DIETARY IRON INTAKE: ICD-10-CM

## 2024-10-22 PROCEDURE — 36415 COLL VENOUS BLD VENIPUNCTURE: CPT | Performed by: INTERNAL MEDICINE

## 2024-10-23 LAB
ANION GAP SERPL CALCULATED.3IONS-SCNC: 7.6 MMOL/L (ref 5–15)
BASOPHILS # BLD AUTO: 0.02 10*3/MM3 (ref 0–0.2)
BASOPHILS NFR BLD AUTO: 0.4 % (ref 0–1.5)
BUN SERPL-MCNC: 19 MG/DL (ref 8–23)
BUN/CREAT SERPL: 14.4 (ref 7–25)
CALCIUM SPEC-SCNC: 9.5 MG/DL (ref 8.6–10.5)
CHLORIDE SERPL-SCNC: 107 MMOL/L (ref 98–107)
CO2 SERPL-SCNC: 26.4 MMOL/L (ref 22–29)
CREAT SERPL-MCNC: 1.32 MG/DL (ref 0.76–1.27)
DEPRECATED RDW RBC AUTO: 41.6 FL (ref 37–54)
EGFRCR SERPLBLD CKD-EPI 2021: 55.9 ML/MIN/1.73
EOSINOPHIL # BLD AUTO: 0.27 10*3/MM3 (ref 0–0.4)
EOSINOPHIL NFR BLD AUTO: 5 % (ref 0.3–6.2)
ERYTHROCYTE [DISTWIDTH] IN BLOOD BY AUTOMATED COUNT: 12.4 % (ref 12.3–15.4)
GLUCOSE SERPL-MCNC: 126 MG/DL (ref 65–99)
HBA1C MFR BLD: 6.4 % (ref 4.8–5.6)
HCT VFR BLD AUTO: 36.9 % (ref 37.5–51)
HGB BLD-MCNC: 12.2 G/DL (ref 13–17.7)
IMM GRANULOCYTES # BLD AUTO: 0.01 10*3/MM3 (ref 0–0.05)
IMM GRANULOCYTES NFR BLD AUTO: 0.2 % (ref 0–0.5)
IRON 24H UR-MRATE: 93 MCG/DL (ref 59–158)
IRON SATN MFR SERPL: 29 % (ref 20–50)
LYMPHOCYTES # BLD AUTO: 1.81 10*3/MM3 (ref 0.7–3.1)
LYMPHOCYTES NFR BLD AUTO: 33.7 % (ref 19.6–45.3)
MCH RBC QN AUTO: 30.5 PG (ref 26.6–33)
MCHC RBC AUTO-ENTMCNC: 33.1 G/DL (ref 31.5–35.7)
MCV RBC AUTO: 92.3 FL (ref 79–97)
MONOCYTES # BLD AUTO: 0.51 10*3/MM3 (ref 0.1–0.9)
MONOCYTES NFR BLD AUTO: 9.5 % (ref 5–12)
NEUTROPHILS NFR BLD AUTO: 2.75 10*3/MM3 (ref 1.7–7)
NEUTROPHILS NFR BLD AUTO: 51.2 % (ref 42.7–76)
NRBC BLD AUTO-RTO: 0 /100 WBC (ref 0–0.2)
PLATELET # BLD AUTO: 248 10*3/MM3 (ref 140–450)
PMV BLD AUTO: 9.9 FL (ref 6–12)
POTASSIUM SERPL-SCNC: 4.5 MMOL/L (ref 3.5–5.2)
RBC # BLD AUTO: 4 10*6/MM3 (ref 4.14–5.8)
SODIUM SERPL-SCNC: 141 MMOL/L (ref 136–145)
TIBC SERPL-MCNC: 316 MCG/DL (ref 298–536)
TRANSFERRIN SERPL-MCNC: 212 MG/DL (ref 200–360)
WBC NRBC COR # BLD AUTO: 5.37 10*3/MM3 (ref 3.4–10.8)

## 2024-10-23 PROCEDURE — 84466 ASSAY OF TRANSFERRIN: CPT | Performed by: INTERNAL MEDICINE

## 2024-10-23 PROCEDURE — 83540 ASSAY OF IRON: CPT | Performed by: INTERNAL MEDICINE

## 2024-10-23 PROCEDURE — 85025 COMPLETE CBC W/AUTO DIFF WBC: CPT | Performed by: INTERNAL MEDICINE

## 2024-10-23 PROCEDURE — 80048 BASIC METABOLIC PNL TOTAL CA: CPT | Performed by: INTERNAL MEDICINE

## 2024-10-23 PROCEDURE — 83036 HEMOGLOBIN GLYCOSYLATED A1C: CPT | Performed by: INTERNAL MEDICINE

## 2024-10-29 ENCOUNTER — OFFICE VISIT (OUTPATIENT)
Dept: INTERNAL MEDICINE | Age: 74
End: 2024-10-29
Payer: MEDICARE

## 2024-10-29 VITALS
BODY MASS INDEX: 30.39 KG/M2 | SYSTOLIC BLOOD PRESSURE: 130 MMHG | WEIGHT: 257.4 LBS | TEMPERATURE: 97.5 F | HEART RATE: 61 BPM | HEIGHT: 77 IN | OXYGEN SATURATION: 99 % | DIASTOLIC BLOOD PRESSURE: 80 MMHG

## 2024-10-29 DIAGNOSIS — D50.8 IRON DEFICIENCY ANEMIA SECONDARY TO INADEQUATE DIETARY IRON INTAKE: ICD-10-CM

## 2024-10-29 DIAGNOSIS — E11.9 TYPE 2 DIABETES MELLITUS WITHOUT COMPLICATION, WITHOUT LONG-TERM CURRENT USE OF INSULIN: Primary | ICD-10-CM

## 2024-10-29 DIAGNOSIS — E78.2 MIXED HYPERLIPIDEMIA: ICD-10-CM

## 2024-10-29 DIAGNOSIS — D50.9 IRON DEFICIENCY ANEMIA, UNSPECIFIED IRON DEFICIENCY ANEMIA TYPE: ICD-10-CM

## 2024-10-29 DIAGNOSIS — Z23 NEED FOR IMMUNIZATION AGAINST INFLUENZA: ICD-10-CM

## 2024-10-29 DIAGNOSIS — R06.83 SNORING: ICD-10-CM

## 2024-10-29 DIAGNOSIS — R53.83 OTHER FATIGUE: ICD-10-CM

## 2024-10-29 DIAGNOSIS — I10 ESSENTIAL HYPERTENSION: ICD-10-CM

## 2024-10-29 PROCEDURE — 1160F RVW MEDS BY RX/DR IN RCRD: CPT | Performed by: INTERNAL MEDICINE

## 2024-10-29 PROCEDURE — 3075F SYST BP GE 130 - 139MM HG: CPT | Performed by: INTERNAL MEDICINE

## 2024-10-29 PROCEDURE — 99214 OFFICE O/P EST MOD 30 MIN: CPT | Performed by: INTERNAL MEDICINE

## 2024-10-29 PROCEDURE — 90662 IIV NO PRSV INCREASED AG IM: CPT | Performed by: INTERNAL MEDICINE

## 2024-10-29 PROCEDURE — G0008 ADMIN INFLUENZA VIRUS VAC: HCPCS | Performed by: INTERNAL MEDICINE

## 2024-10-29 PROCEDURE — 1159F MED LIST DOCD IN RCRD: CPT | Performed by: INTERNAL MEDICINE

## 2024-10-29 PROCEDURE — 3079F DIAST BP 80-89 MM HG: CPT | Performed by: INTERNAL MEDICINE

## 2024-10-29 NOTE — PROGRESS NOTES
"Chief Complaint  Diabetes and Follow-up (Pt states that this is routine, he had labs. )    Subjective          Marcio Enrique presents to [facility]     History of Present Illness:  74-year-old male with underlying hypertension, hyperlipidemia, diabetes, who is coming in 10/24 for routine 4-month follow-up.  We will go over his meds, review his labs in detail, and address any new concerns he may have.    Review of Systems   Constitutional:  Negative for appetite change, fatigue and fever.   HENT:  Negative for congestion and ear pain.    Eyes:  Negative for blurred vision.   Respiratory:  Negative for cough, chest tightness, shortness of breath and wheezing.    Cardiovascular:  Negative for chest pain, palpitations and leg swelling.   Gastrointestinal:  Negative for abdominal pain.   Genitourinary:  Negative for difficulty urinating, dysuria and hematuria.   Musculoskeletal:  Negative for arthralgias and gait problem.   Skin:  Negative for skin lesions.   Neurological:  Negative for syncope, memory problem and confusion.   Psychiatric/Behavioral:  Negative for self-injury and depressed mood.        Objective   Vital Signs:   /80   Pulse 61   Temp 97.5 °F (36.4 °C) (Skin)   Ht 195.6 cm (77.01\")   Wt 117 kg (257 lb 6.4 oz)   SpO2 99%   BMI 30.52 kg/m²           Physical Exam  Vitals and nursing note reviewed.   Constitutional:       General: He is not in acute distress.     Appearance: Normal appearance. He is not toxic-appearing.   HENT:      Head: Atraumatic.      Right Ear: External ear normal.      Left Ear: External ear normal.      Nose: Nose normal.      Mouth/Throat:      Mouth: Mucous membranes are moist.   Eyes:      General:         Right eye: No discharge.         Left eye: No discharge.      Extraocular Movements: Extraocular movements intact.      Pupils: Pupils are equal, round, and reactive to light.   Cardiovascular:      Rate and Rhythm: Normal rate and " You were evaluated in the emergency room for a possible allergic reaction.  Be aware that allergic reactions can sometimes have a biphasic nature which means that symptoms can return.  If they do so please return to the emergency room.  If you feel like your tongue is swelling, lips are swelling, throat swelling, or have any other concerning symptoms please feel free to return to the emergency room as well.    Please follow-up with your primary care doctor the indicated amount of time.  If you do not have a primary care doctor we are providing you one, however if you do have a primary care doctor already please feel free to follow up with that primary care doctor.    It is still very important to follow-up with your primary care doctor because there many non-emergent things that can still cause many problems if left unaddressed.    Please return to the emergency room immediately if you develop worsening chest pain, shortness of breath, abdominal pain, nausea and vomiting that is intractable, fevers, confusion, or any other concerning symptoms.  Please note that you can return to the emergency room at any time if you have any concerns at all.     regular rhythm.      Pulses: Normal pulses.      Heart sounds: Normal heart sounds. No murmur heard.     No gallop.   Pulmonary:      Effort: Pulmonary effort is normal. No respiratory distress.      Breath sounds: No wheezing, rhonchi or rales.   Abdominal:      General: There is no distension.      Palpations: Abdomen is soft. There is no mass.      Tenderness: There is no abdominal tenderness. There is no guarding.   Musculoskeletal:         General: No swelling or tenderness.      Cervical back: No tenderness.      Right lower leg: No edema.      Left lower leg: No edema.   Skin:     General: Skin is warm and dry.      Findings: No rash.   Neurological:      General: No focal deficit present.      Mental Status: He is alert and oriented to person, place, and time. Mental status is at baseline.      Motor: No weakness.      Gait: Gait normal.   Psychiatric:         Mood and Affect: Mood normal.         Thought Content: Thought content normal.          Result Review :   The following data was reviewed by: Aleksandr Olmstead MD on 08/23/2021:                  Assessment and Plan    Diagnoses and all orders for this visit:    1. Type 2 diabetes mellitus without complication, without long-term current use of insulin (Primary)  Assessment & Plan:  A1c is only 6.4 as of his 10/24 OV.  He is just on low to moderate dose metformin, so certainly appropriate to continue same.    Orders:  -     Hemoglobin A1c; Future    2. Need for immunization against influenza  -     Fluzone High-Dose 65+yrs ([id number])    3. Essential hypertension  Assessment & Plan:  Blood pressure remains well-controlled and his pulse around 60 as of his 10/24 OV.  He is on full dose amlodipine/valsartan along with low doses of doxazosin and metoprolol, he is certainly stable to continue same.    Orders:  -     Comprehensive Metabolic Panel; Future  -     Cancel: Basic Metabolic Panel; Future    4. Iron deficiency anemia secondary to inadequate dietary iron  intake  Assessment & Plan:  Hemoglobin is stable for him at least at 12.2, his iron is 93 and he is on 3 times a week over-the-counter iron supplement.    He had a normal Cologuard just 2 years ago.    Continue same plan of care.      5. Iron deficiency anemia, unspecified iron deficiency anemia type  -     CBC & Differential; Future  -     Iron Profile; Future  -     Ferritin; Future    6. Mixed hyperlipidemia  Overview:  CT calcium score 6/24:  Left main (LM): 0  Left anterior descending (LAD): 1  Left circumflex (CX): 12  Right coronary artery (RCA): 0  Total 13    Orders:  -     Lipid Panel; Future    7. Other fatigue  Assessment & Plan:  This is lingering on as of 10/24.    As noted his thyroid function, B12, CBC, CT calcium score, PFTs etc. were all pretty unremarkable.    His issue is more when he tries to be active, but we pretty much rule that out.  He did have a stress test itself couple years ago as well which was normal.    Recommend we get a sleep study for further evaluation as of his 10/24 OV.    Orders:  -     Ambulatory Referral to Sleep Lab    8. Snoring  -     Ambulatory Referral to Sleep Lab              --  --  Older notes:  VISIT 4/21:   ANNUAL MEDICARE WELLNESS EXAM 12/20 = reviewed all forms with pt; no new discoveries.  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  --  DM stable on only 500 qd and that is fine...6.3...6.9 and I d/w bad trend...6.9 is holding on 500 qd only... 7.4 and rec 1000 qd again...7.0...7.4 is due to the steroids, and they are being weaned, so no med changes made 9/19...6.9 is back down off steroids...7.5 and will increase on RTO if same/higher  = only on 500 qd recall...6.9 on 1000 qAM as of 8/20 OV---> 7.1 is fine 4/21. (TSH neg 4/21).  --  LIPIDS stable 3/14 w/o treatment...elevated CK without statins anyhow...defer... holding steady...122 and will add statin now...78, but CK up and c/o joints at 5/18 OV, so will hold Pravastatin 10 mg for now...86 is fine...96=ok for now---> 84 in 4/20.  --  HTN with need for checks at home before increase Exforge to max dose as of 2/18 OV... BP stable off HCTZ and on toprol...required increase exforge per Karyn; BP great 4/19 OV despite back on Naproxen past week---> stable 4/21.  --  SYNCOPE 2/18 and to ER for 5 hrs; had neg V/Q; did have aura, so most c/w vasovagal; exam neg; will get ECHO/Dopplers/tilt to complete w/u...ECHO is as below, the Carotids 3/18 were neg, and TILT + for NEUROCARDIOGENIC SYNCOPE=will try to lose HCTZ and add in low dose of toprol; pt to monitor BP and call with results; d/w keep hydrated/heed warning signs...c/o dizziness with rapid head mvmt and rec eval per ENT now...got better on it's own.  ASCENDING AORTIC DILATATION=3.8 cm per ECHO 3/18 (d/w nl 3.5 or less, dilated 3.5 to 4.5, aneurysmal is above 4.4, and surgery above 5.5).  --  DJD with L knee/shoulder requiring MRI's...increased sxs 5/18, so holding statin...c/o knee again 12/18, but doesn't sound severe; OTC meds for now...I was unaware of bump in BP, so I started Naprosyn back a week ago...on prednisone as of 6/19 OV with Dr Mcgovern following now...ESR down 86 to 9 on prednisone as of 9/19 OV...still being weaned as of 12/19 OV and I d/w add tylenol to help offset the prednisone wean.  --  H PYLORI +...d/w tx plan with prilosec 20 mg/amoxil 1000mg/biaxin 500 mg all bid for 14 days...tolerated this.  GERD/BURPPING was better when he was on PPI for above, so try qod...no c/o 10/17.  GALLSTONES noted per below eval and will eval if persistent.  R ABD PAIN to ER, was nauseated, CT=? liver cysts and L renal lesion, he's concerned,  so will get MRI 2/16... MRI 3/17 is with stable cysts.   --  BPH with intol to cardura prior, slow stream but no other sx's...defer for now.   --  --  PSA 2.8 (3/26/2024)  COLON 8/22 = Cologuard negative  PNEUMOVAX #1 12/13, Prevnar '16; rec Hep A 5/18.  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    Follow Up   Return in about 4 months (around 2/28/2025).  Patient was given instructions and counseling regarding his condition or for health maintenance advice. Please see specific information pulled into the AVS if appropriate.

## 2024-10-29 NOTE — ASSESSMENT & PLAN NOTE
A1c is only 6.4 as of his 10/24 OV.  He is just on low to moderate dose metformin, so certainly appropriate to continue same.  
Blood pressure remains well-controlled and his pulse around 60 as of his 10/24 OV.  He is on full dose amlodipine/valsartan along with low doses of doxazosin and metoprolol, he is certainly stable to continue same.  
Hemoglobin is stable for him at least at 12.2, his iron is 93 and he is on 3 times a week over-the-counter iron supplement.    He had a normal Cologuard just 2 years ago.    Continue same plan of care.  
This is lingering on as of 10/24.    As noted his thyroid function, B12, CBC, CT calcium score, PFTs etc. were all pretty unremarkable.    His issue is more when he tries to be active, but we pretty much rule that out.  He did have a stress test itself couple years ago as well which was normal.    Recommend we get a sleep study for further evaluation as of his 10/24 OV.  
Yes

## 2024-11-08 ENCOUNTER — TELEPHONE (OUTPATIENT)
Dept: INTERNAL MEDICINE | Age: 74
End: 2024-11-08
Payer: MEDICARE

## 2024-11-08 ENCOUNTER — OFFICE VISIT (OUTPATIENT)
Dept: SLEEP MEDICINE | Facility: HOSPITAL | Age: 74
End: 2024-11-08
Payer: MEDICARE

## 2024-11-08 VITALS
WEIGHT: 255.6 LBS | HEIGHT: 77 IN | BODY MASS INDEX: 30.18 KG/M2 | DIASTOLIC BLOOD PRESSURE: 66 MMHG | SYSTOLIC BLOOD PRESSURE: 124 MMHG | HEART RATE: 63 BPM | OXYGEN SATURATION: 99 %

## 2024-11-08 DIAGNOSIS — G47.33 OSA (OBSTRUCTIVE SLEEP APNEA): Primary | ICD-10-CM

## 2024-11-08 PROCEDURE — 3074F SYST BP LT 130 MM HG: CPT | Performed by: INTERNAL MEDICINE

## 2024-11-08 PROCEDURE — 3078F DIAST BP <80 MM HG: CPT | Performed by: INTERNAL MEDICINE

## 2024-11-08 PROCEDURE — G0463 HOSPITAL OUTPT CLINIC VISIT: HCPCS

## 2024-11-08 RX ORDER — FERROUS SULFATE 325(65) MG
325 TABLET ORAL 3 TIMES WEEKLY
COMMUNITY

## 2024-11-08 RX ORDER — SILDENAFIL 50 MG/1
TABLET, FILM COATED ORAL
Qty: 30 TABLET | Refills: 2 | Status: SHIPPED | OUTPATIENT
Start: 2024-11-08

## 2024-11-08 NOTE — TELEPHONE ENCOUNTER
Pt states he is having a new issue of erectile dysfunction and would like to see about medication options. States that he will come in to be seen if needed. Please advise

## 2024-11-08 NOTE — TELEPHONE ENCOUNTER
Please let patient know that I sent a prescription over to his pharmacy.  There are 50 mg tablets, he should try using 25 mg initially, if not effective, he can increase it to 50 mg, and later to 100 mg as indicated.  Based on what his insurance charges, he may want to utilize [facility] instead.

## 2024-11-08 NOTE — PROGRESS NOTES
[facility]      Patient Care Team:  Aleksandr Olmstead MD as PCP - General (Internal Medicine)  Mariposa Sawant PA-C as Physician Assistant (Orthopedic Surgery)    Referring Provider: Aleksandr Olmstead MD    Chief complaint:   Lack of energy/fatigue.     History of present illness:    Subjective   This is a 74 year old male patient with hx of DM II and HTN.     He is here today because of symptoms of fatigue which persisted despite treatment with iron supplements.  He sleeps alone.  However, he reported loud snoring which was mentioned to him by his  wife.  Sleep is not refreshing despite getting adequate hours of sleep but it appears that he goes to bed late and wake up late during the day.    Sleep schedule:  -Bedtime: 10 PM- watches TV and uses his cell phone  -Sleep latency: 3h- falls asleep around 2 AM  -Wake up time: 9:30 AM- 10 AM , does feel refreshed  -Nocturnal awakenin-1 times for nocturia.         ESS: Total score: 8     Review of Systems  Constitutional: No fever or chills. No changes in appetite. Fatigue.   ENMT: No sinus congestion, postnasal drip, hoarsness  Cardiovascular: No chest pain, palpitation or legs swelling.    Respiratory: No dyspnea, cough or wheezing.  Gastrointestinal: No constipation, diarrhea, abdominal pain or acid reflux  Neurology: No headache, weakness, numbness or dizziness.   Musculoskeletal: No joints pain, stiffness or swelling.   Psychiatry: No depression, anxiety or irritability.   Hem/Lymphatic: No swollen glands or easy bruising.  Integumentary: No rash.  Endocrinology: No excessive thirst, cold or warm intolerance.   Urinary: No dysuria, bloody urine or frequent urination.     History  Past Medical History:   Diagnosis Date    Acid reflux     Anesthesia     FAMILY AND Pt REPORT Pt WAS COMBATIVE AFTER DENTAL PROCEDURE    Dizziness     DJD (degenerative joint disease)     LEFT SHOULDER; LEFT KNEE= MRI     Essential (primary) hypertension     Gastro-esophageal reflux disease without esophagitis     High cholesterol     HTN (hypertension)     Iron deficiency anemia, unspecified     Left knee pain     Mixed hyperlipidemia     Neuropathy     Other fatigue     Partial tear of left rotator cuff     PUD (peptic ulcer disease)     Pure hypercholesterolemia     INCREASED CK WITH ALL    SOB (shortness of breath) 2021    HAD ECHO/ STRESS NO PROBLEMS SINCE THEN. NO CARDIOLOGIST- DENIED CP/SOB    Type 2 diabetes mellitus without complication     ON MEDS- DOESN'T CHECK BG DAILY AT HOME    Unspecified osteoarthritis, unspecified site    ,   Past Surgical History:   Procedure Laterality Date    CIRCUMCISION      KNEE ACL RECONSTRUCTION      left    KNEE SURGERY Left     CLEAN     TOOTH EXTRACTION      TOTAL KNEE ARTHROPLASTY Left 10/24/2023    Procedure: LEFT TOTAL KNEE ARTHROPLASTY WITH DAVID ROBOT;  Surgeon: Gurinder Figueroa MD;  Location: [facility] MAIN OR;  Service: Robotics - Ortho;  Laterality: Left;   ,   Family History   Problem Relation Age of Onset    Colon cancer Other         X2 HE BELIEVES    Malig Hyperthermia Neg Hx    ,   Social History     Socioeconomic History    Marital status:    Tobacco Use    Smoking status: Never    Smokeless tobacco: Never   Vaping Use    Vaping status: Never Used   Substance and Sexual Activity    Alcohol use: No    Drug use: No    Sexual activity: Defer     E-cigarette/Vaping    E-cigarette/Vaping Use Never User     Passive Exposure No     Counseling Given Yes      E-cigarette/Vaping Substances    Nicotine No     THC No     CBD No     Flavoring No      E-cigarette/Vaping Devices    Disposable No     Pre-filled or Refillable Cartridge No     Refillable Tank No     Pre-filled Pod No          , and Allergies:  Patient has no known allergies.    Medications:    Current Outpatient Medications:     amLODIPine-valsartan (EXFORGE)  MG per tablet, TAKE 1 TABLET BY MOUTH EVERY DAY, Disp: 90  "tablet, Rfl: 1    Blood Glucose Calibration (Accu-Chek Guide Control) liquid, 1 each by In Vitro route Daily As Needed (machine needing controled)., Disp: 1 each, Rfl: 5    cetirizine (zyrTEC) 10 MG tablet, Take 1 tablet by mouth Daily., Disp: 90 tablet, Rfl: 1    diclofenac (VOLTAREN) 0.1 % ophthalmic solution, Administer 1 drop into the left eye 4 (Four) Times a Day., Disp: 2.5 mL, Rfl: 0    doxazosin (CARDURA) 2 MG tablet, TAKE 1 TABLET BY MOUTH EVERY DAY AT NIGHT, Disp: 90 tablet, Rfl: 1    ferrous sulfate 325 (65 FE) MG tablet, Take 1 tablet by mouth 3 (Three) Times a Week., Disp: , Rfl:     meloxicam (MOBIC) 15 MG tablet, TAKE 1 TABLET BY MOUTH EVERY DAY, Disp: 30 tablet, Rfl: 2    metFORMIN ER (GLUCOPHAGE-XR) 500 MG 24 hr tablet, TAKE 2 TABLETS BY MOUTH DAILY WITH BREAKFAST., Disp: 180 tablet, Rfl: 1    metoprolol succinate XL (TOPROL-XL) 25 MG 24 hr tablet, TAKE 1/2 TABLET BY MOUTH DAILY, Disp: 45 tablet, Rfl: 1    multivitamin with minerals tablet tablet, Take 1 tablet by mouth Daily., Disp: , Rfl:     omeprazole (priLOSEC) 40 MG capsule, TAKE 1 CAPSULE BY MOUTH TWICE A DAY, Disp: 180 capsule, Rfl: 1    pravastatin (PRAVACHOL) 20 MG tablet, TAKE 1 TABLET BY MOUTH EVERY DAY, Disp: 90 tablet, Rfl: 1    sertraline (ZOLOFT) 50 MG tablet, TAKE 1 TABLET BY MOUTH EVERY DAY, Disp: 90 tablet, Rfl: 1    traZODone (DESYREL) 50 MG tablet, TAKE 2 TABLETS BY MOUTH EVERY NIGHT, Disp: 180 tablet, Rfl: 1    triamcinolone (KENALOG) 0.5 % cream, Apply 1 Application topically to the appropriate area as directed 3 (Three) Times a Day., Disp: 30 g, Rfl: 1      Objective   Vital Signs:  Vitals:    11/08/24 1100   BP: 124/66   Pulse: 63   SpO2: 99%   Weight: 116 kg (255 lb 9.6 oz)   Height: 195.6 cm (77.01\")     Body mass index is 30.3 kg/m².  Neck Circumference: 15 inches     Physical Exam:  Neck Circumference: 15 inches     Constitutional: Not in acute distress.  Eyes: Injected conjunctiva, EOMI. pupils equal reactive to " light.  ENMT: Rios score 4. Mallampati score 4. No oral thrush. Tonsils grade 1. Narrow distance in between the posterior pharyngeal pillars and narrow retropharyngeal.   Neck: Large. No thyromegaly.  Trachea midline.  Heart: Regular rhythm and rate, no murmur  Lungs: Good and equal air entry bilaterally. No crackles or wheezing.  Nonlabored breathing.       Abdomen: Obese.  Soft.  No tenderness.  Positive bowel sounds.  Extremities: No cyanosis, clubbing or pitting edema.  Warm extremities and well-perfused.  Neuro: Conscious, alert, oriented x3.  Gait is normal.  Strength 5/5 in arms and legs.  Psych: Appropriate mood and affect.    Integumentary: No rash.  Normal skin turgor.  Lymphatic: No palpable cervical or supraclavicular lymph nodes.    Diagnostic data:  Lab Results   Component Value Date    HGBA1C 6.40 (H) 10/23/2024     Total Cholesterol   Date Value Ref Range Status   05/23/2024 140 0 - 200 mg/dL Final     Triglycerides   Date Value Ref Range Status   05/23/2024 106 0 - 150 mg/dL Final     HDL Cholesterol   Date Value Ref Range Status   05/23/2024 52 40 - 60 mg/dL Final     Hemoglobin   Date Value Ref Range Status   10/23/2024 12.2 (L) 13.0 - 17.7 g/dL Final     CO2   Date Value Ref Range Status   10/23/2024 26.4 22.0 - 29.0 mmol/L Final        Assessment   Snoring  Obesity, BMI 30  HTN      Plan:  Check HST. We discussed the pathophysiology of sleep apnea, testing and therapy which include CPAP and weight loss.  Patient is agreeable with CPAP therapy if needed.  Patient does not appear to be much symptomatic but he lives alone and he also has a very suspicious upper airway anatomy.    Counseled for weight loss.  Encouraged to exercise regularly and cut down on carbohydrates.  Discussed that losing weight may decrease the severity of sleep apnea and obviate the need of CPAP therapy.    Discussed the association between obstructive sleep apnea and cardiovascular disease including HTN and the  beneficial effect of Pap therapy in reducing the risk of major cardiovascular events.        Michelle Schulte MD  11/08/24  11:29 EST    This note was dictated utilizing Dragon dictation

## 2024-11-13 ENCOUNTER — OFFICE VISIT (OUTPATIENT)
Dept: ORTHOPEDIC SURGERY | Facility: CLINIC | Age: 74
End: 2024-11-13
Payer: MEDICARE

## 2024-11-13 ENCOUNTER — HOSPITAL ENCOUNTER (OUTPATIENT)
Dept: SLEEP MEDICINE | Facility: HOSPITAL | Age: 74
Discharge: HOME | End: 2024-11-13
Admitting: INTERNAL MEDICINE
Payer: MEDICARE

## 2024-11-13 VITALS
BODY MASS INDEX: 30.11 KG/M2 | DIASTOLIC BLOOD PRESSURE: 61 MMHG | SYSTOLIC BLOOD PRESSURE: 124 MMHG | WEIGHT: 255 LBS | HEIGHT: 77 IN | HEART RATE: 61 BPM | OXYGEN SATURATION: 97 %

## 2024-11-13 DIAGNOSIS — M25.562 LEFT KNEE PAIN, UNSPECIFIED CHRONICITY: Primary | ICD-10-CM

## 2024-11-13 DIAGNOSIS — G47.33 OSA (OBSTRUCTIVE SLEEP APNEA): ICD-10-CM

## 2024-11-13 DIAGNOSIS — Z47.1 AFTERCARE FOLLOWING LEFT KNEE JOINT REPLACEMENT SURGERY: ICD-10-CM

## 2024-11-13 DIAGNOSIS — Z96.652 AFTERCARE FOLLOWING LEFT KNEE JOINT REPLACEMENT SURGERY: ICD-10-CM

## 2024-11-13 PROCEDURE — 3074F SYST BP LT 130 MM HG: CPT

## 2024-11-13 PROCEDURE — G0399 HOME SLEEP TEST/TYPE 3 PORTA: HCPCS

## 2024-11-13 PROCEDURE — 99213 OFFICE O/P EST LOW 20 MIN: CPT

## 2024-11-13 PROCEDURE — 3078F DIAST BP <80 MM HG: CPT

## 2024-11-13 ASSESSMENT — KOOS JR
KOOS JR SCORE: 3
KOOS JR SCORE: 79.91

## 2024-11-13 NOTE — PROGRESS NOTES
"Chief Complaint  Follow-up of the Left Knee    Subjective      Marcio Enrique presents to [facility] for follow up of his left knee.  Patient is 1 year status post left total knee arthroplasty performed by Dr. Figueroa on 10/20/2023. States overall he is doing very well. Gets pain/pressure with twisting, but otherwise denies issues with the knee. Denies any falls or injuries.     No Known Allergies    Objective     Vital Signs:   Vitals:    11/13/24 0929   BP: 124/61   Pulse: 61   SpO2: 97%   Weight: 116 kg (255 lb)   Height: 195.6 cm (77.01\")     Body mass index is 30.23 kg/m².    I reviewed the patient's chief complaint, history of present illness, review of systems, past medical history, surgical history, family history, social history, medications, and allergy list.     REVIEW OF SYSTEMS    Constitutional: Denies fevers, chills, weight loss  Cardiovascular: Denies chest pain, shortness of breath  Skin: Denies rashes, acute skin changes  Neurologic: Denies headache, loss of consciousness  MSK: Left knee pain.     Ortho Exam    General: Alert, no acute distress.   Left knee: Incision well-healed.  Knee stable to varus/valgus stress.  Knee extensor mechanism intact. 0 knee extension. Flexion to 120 degrees. Calf soft, non-tender.  Sensation and neurovascularly intact.  Demonstrates active ankle dorsiflexion and plantarflexion.  Palpable pedal pulses.               Imaging Results (Most Recent)       Procedure Component Value Units Date/Time    XR Knee 3 View Left [[id number]] Resulted: 11/13/24 1209     Updated: 11/13/24 1209    Narrative:      X-Ray Report:  Study: X-rays ordered, taken in the office, and reviewed today.   Site: Left knee Xray  Indication: Left total knee arthroplasty follow up.   View: AP/Lateral, Standing, and Dazey view(s)  Findings: Intact left total knee arthroplasty. No evidence of hardware   malfunction, complication or loosening. No periprosthetic fractures "   visualized. Patella is midline tracking on sunrise view.   Prior studies available for comparison: yes                    Assessment and Plan   Diagnoses and all orders for this visit:    1. Left knee pain, unspecified chronicity (Primary)  -     XR Knee 3 View Left    2. Aftercare following left knee joint replacement surgery         Marcio Enrique is 1 year post-op left total knee arthroplasty performed by Dr Figueroa on 10/20/2023. X-rays reviewed, showing hardware intact and no complications.  Patient is doing well. Continue home exercise program to progress strength and ROM.     Discussed the importance of lifelong antibiotic prophylaxis with dental procedures following total joint replacement. In the event of a dental procedure, patient is welcome to contact our office to obtain antibiotics prior to the procedure if their dentist does not provide the prescription. Patient verbalized understanding.     Follow-up in 1 year. We will repeat xray's of the prosthetic joint at that time.   Call sooner or return to care, if needed with any changes or concerns.        Tobacco Use: Low Risk  (11/13/2024)    Patient History     Smoking Tobacco Use: Never     Smokeless Tobacco Use: Never     Passive Exposure: Not on file     Patient reports that they are a nonsmoker; cessation education not applicable.            Follow Up   Return in about 1 year (around 11/13/2025).  There are no Patient Instructions on file for this visit.  Patient was given instructions and counseling regarding his condition or for health maintenance advice. Please see specific information pulled into the AVS if appropriate.       Dictated Utilizing Dragon Dictation. Please note that portions of this note were completed with a voice recognition program. Part of this note may be an electronic transcription/translation of spoken language to printed text using the Dragon Dictation System.

## 2024-11-16 DIAGNOSIS — N52.9 ERECTILE DYSFUNCTION, UNSPECIFIED ERECTILE DYSFUNCTION TYPE: Primary | ICD-10-CM

## 2024-12-30 ENCOUNTER — TELEPHONE (OUTPATIENT)
Dept: SLEEP MEDICINE | Facility: HOSPITAL | Age: 74
End: 2024-12-30
Payer: MEDICARE

## 2025-01-07 RX ORDER — MELOXICAM 15 MG/1
15 TABLET ORAL DAILY
Qty: 30 TABLET | Refills: 2 | OUTPATIENT
Start: 2025-01-07

## 2025-01-07 RX ORDER — PRAVASTATIN SODIUM 20 MG
20 TABLET ORAL DAILY
Qty: 90 TABLET | Refills: 1 | Status: SHIPPED | OUTPATIENT
Start: 2025-01-07

## 2025-01-09 ENCOUNTER — TELEPHONE (OUTPATIENT)
Dept: INTERNAL MEDICINE | Age: 75
End: 2025-01-09
Payer: MEDICARE

## 2025-01-09 RX ORDER — MELOXICAM 15 MG/1
15 TABLET ORAL DAILY
Qty: 90 TABLET | Refills: 1 | Status: SHIPPED | OUTPATIENT
Start: 2025-01-09

## 2025-01-09 NOTE — TELEPHONE ENCOUNTER
Pt was put on Mobic by Ortho.   He tried to get a refill, but Ms. Sawant is no longer practicing there, he is wanting to know if you will take this over. I have RX ready if you agree.

## 2025-01-09 NOTE — TELEPHONE ENCOUNTER
Prescription signed, please ensure the patient is aware that the only thing he can use over-the-counter for pain while on Mobic is Tylenol.  Thanks.

## 2025-01-10 RX ORDER — AMLODIPINE AND VALSARTAN 10; 320 MG/1; MG/1
1 TABLET ORAL DAILY
Qty: 90 TABLET | Refills: 1 | Status: SHIPPED | OUTPATIENT
Start: 2025-01-10

## 2025-01-20 NOTE — PROGRESS NOTES
Chief Complaint: Erectile Dysfunction    Subjective         History of Present Illness  Marcio Enrique is a 74 y.o. male presents to [facility] to be seen for ED.    Patient presents reporting he has been having difficulty with erections. He reports his wife passed away 4-5 years ago and was sick for 10 years prior to that so was having trouble for this.     He was treated with sildenafil but this was not effective. He was able to get some erection but not enough for intercourse.     Frequency-denies    Urgency-sometimes    Incontinence-denies    Nocturia-rare    Stream-not strong    GH-denies    History of stones-denies     surgeries-denies    Family history of  malignancy-brother - prostate CA at age 8    Cardiopulmonary-denies    Anticoagulants-denies    Smoker-denies    PSA  3/26/2024 2.8    Objective     Past Medical History:   Diagnosis Date    Acid reflux     Anesthesia     FAMILY AND Pt REPORT Pt WAS COMBATIVE AFTER DENTAL PROCEDURE    Dizziness     DJD (degenerative joint disease)     LEFT SHOULDER; LEFT KNEE= MRI    Essential (primary) hypertension     Gastro-esophageal reflux disease without esophagitis     High cholesterol     HTN (hypertension)     Iron deficiency anemia, unspecified     Left knee pain     Mixed hyperlipidemia     Neuropathy     Other fatigue     Partial tear of left rotator cuff     PUD (peptic ulcer disease)     Pure hypercholesterolemia     INCREASED CK WITH ALL    SOB (shortness of breath) 2021    HAD ECHO/ STRESS NO PROBLEMS SINCE THEN. NO CARDIOLOGIST- DENIED CP/SOB    Type 2 diabetes mellitus without complication     ON MEDS- DOESN'T CHECK BG DAILY AT HOME    Unspecified osteoarthritis, unspecified site        Past Surgical History:   Procedure Laterality Date    CIRCUMCISION      KNEE ACL RECONSTRUCTION      left    KNEE SURGERY Left     CLEAN     TOOTH EXTRACTION      TOTAL KNEE ARTHROPLASTY Left 10/24/2023    Procedure: LEFT TOTAL KNEE ARTHROPLASTY  WITH DAVID ROBOT;  Surgeon: Henry, Gurinder CAMPBELL MD;  Location: [facility] MAIN OR;  Service: Robotics - Ortho;  Laterality: Left;         Current Outpatient Medications:     amLODIPine-valsartan (EXFORGE)  MG per tablet, TAKE 1 TABLET BY MOUTH EVERY DAY, Disp: 90 tablet, Rfl: 1    Blood Glucose Calibration (Accu-Chek Guide Control) liquid, 1 each by In Vitro route Daily As Needed (machine needing controled)., Disp: 1 each, Rfl: 5    cetirizine (zyrTEC) 10 MG tablet, Take 1 tablet by mouth Daily., Disp: 90 tablet, Rfl: 1    diclofenac (VOLTAREN) 0.1 % ophthalmic solution, Administer 1 drop into the left eye 4 (Four) Times a Day., Disp: 2.5 mL, Rfl: 0    doxazosin (CARDURA) 2 MG tablet, TAKE 1 TABLET BY MOUTH EVERY DAY AT NIGHT, Disp: 90 tablet, Rfl: 1    ferrous sulfate 325 (65 FE) MG tablet, Take 1 tablet by mouth 3 (Three) Times a Week., Disp: , Rfl:     meloxicam (MOBIC) 15 MG tablet, Take 1 tablet by mouth Daily., Disp: 90 tablet, Rfl: 1    metFORMIN ER (GLUCOPHAGE-XR) 500 MG 24 hr tablet, TAKE 2 TABLETS BY MOUTH DAILY WITH BREAKFAST., Disp: 180 tablet, Rfl: 1    metoprolol succinate XL (TOPROL-XL) 25 MG 24 hr tablet, TAKE 1/2 TABLET BY MOUTH DAILY, Disp: 45 tablet, Rfl: 1    multivitamin with minerals tablet tablet, Take 1 tablet by mouth Daily., Disp: , Rfl:     omeprazole (priLOSEC) 40 MG capsule, TAKE 1 CAPSULE BY MOUTH TWICE A DAY, Disp: 180 capsule, Rfl: 1    pravastatin (PRAVACHOL) 20 MG tablet, TAKE 1 TABLET BY MOUTH EVERY DAY, Disp: 90 tablet, Rfl: 1    sertraline (ZOLOFT) 50 MG tablet, TAKE 1 TABLET BY MOUTH EVERY DAY, Disp: 90 tablet, Rfl: 1    traZODone (DESYREL) 50 MG tablet, TAKE 2 TABLETS BY MOUTH EVERY NIGHT, Disp: 180 tablet, Rfl: 1    triamcinolone (KENALOG) 0.5 % cream, Apply 1 Application topically to the appropriate area as directed 3 (Three) Times a Day., Disp: 30 g, Rfl: 1    tadalafil (CIALIS) 10 MG tablet, Take 1 tablet by mouth Daily As Needed for Erectile Dysfunction. May take up to 2  "tablets (20 mg max), Disp: 30 tablet, Rfl: 3    No Known Allergies     Family History   Problem Relation Age of Onset    Colon cancer Other         X2 HE BELIEVES    Malig Hyperthermia Neg Hx        Social History     Socioeconomic History    Marital status:    Tobacco Use    Smoking status: Never     Passive exposure: Never    Smokeless tobacco: Never   Vaping Use    Vaping status: Never Used   Substance and Sexual Activity    Alcohol use: No    Drug use: No    Sexual activity: Defer       Vital Signs:   Resp 14   Ht 195.6 cm (77.01\")   Wt 116 kg (255 lb 11.7 oz)   BMI 30.32 kg/m²      Physical Exam  Vitals reviewed.   Constitutional:       Appearance: Normal appearance.   Neurological:      General: No focal deficit present.      Mental Status: He is alert and oriented to person, place, and time.   Psychiatric:         Mood and Affect: Mood normal.         Behavior: Behavior normal.          Result Review :   The following data was reviewed by: NORA Mcbride on 01/22/2025:     PSA          3/26/2024    09:53   PSA   PSA 2.830          Procedures        Assessment and Plan    Diagnoses and all orders for this visit:    1. Other male erectile dysfunction (Primary)  -     tadalafil (CIALIS) 10 MG tablet; Take 1 tablet by mouth Daily As Needed for Erectile Dysfunction. May take up to 2 tablets (20 mg max)  Dispense: 30 tablet; Refill: 3    Given that sildenafil was not effective, we will switch him to tadalafil.  We did discuss that if he ever goes any medicine with nitroglycerin or nitrates that he cannot take this medication.  We also discussed that if he has an erection lasting longer than 4 hours that he needs to present to the emergency department for further evaluation and management.    We did discuss using pumps and rings as well to help with erections.    We discussed that if tadalafil is not effective the neck step would be injections versus penile implant.  At this point he would not " be interested in either of those options.    I will see him back in 6 months or sooner for new concerns.    Follow Up   No follow-ups on file.  Patient was given instructions and counseling regarding his condition or for health maintenance advice. Please see specific information pulled into the AVS if appropriate.         This document has been electronically signed by NORA Mcbride  January 22, 2025 10:40 EST

## 2025-01-22 ENCOUNTER — OFFICE VISIT (OUTPATIENT)
Dept: UROLOGY | Age: 75
End: 2025-01-22
Payer: MEDICARE

## 2025-01-22 VITALS — WEIGHT: 255.73 LBS | BODY MASS INDEX: 30.2 KG/M2 | HEIGHT: 77 IN | RESPIRATION RATE: 14 BRPM

## 2025-01-22 DIAGNOSIS — N52.8 OTHER MALE ERECTILE DYSFUNCTION: Primary | ICD-10-CM

## 2025-01-22 RX ORDER — TADALAFIL 10 MG/1
10 TABLET ORAL DAILY PRN
Qty: 30 TABLET | Refills: 3 | Status: SHIPPED | OUTPATIENT
Start: 2025-01-22

## 2025-01-31 ENCOUNTER — TELEMEDICINE (OUTPATIENT)
Dept: SLEEP MEDICINE | Facility: HOSPITAL | Age: 75
End: 2025-01-31
Payer: MEDICARE

## 2025-01-31 NOTE — PROGRESS NOTES
[facility]      Patient Care Team:  Aleksandr Olmstead MD as PCP - General (Internal Medicine)  Mariposa Sawant PA-C as Physician Assistant (Orthopedic Surgery)    Referring Provider: Michelle Schulte MD    Chief complaint:   Lack of energy/fatigue.     History of present illness:    Subjective     The provider is located in [place], [place] using a secure MyChart Video Visit through [facility]. Patient is being seen remotely via telehealth at their home address in [place], and stated that they are in a secure environment for this session. The patient's condition being diagnosed/treated is appropriate for telemedicine. The provider has identified herself as well as her credentials. The patient and/or patient's guardian, consent to be seen remotely, and when consent is given they understand that the consent allows for patient identifiable information to be sent to a third party as needed. They may refuse to be seen remotely at any time. The electronic data is encrypted and password protected, and the patient and/or guardian has been advised of the potential risks to privacy not withstanding such measures.  PT identifiers used: Name and .     You have chosen to receive care through a telehealth visit. Do you consent to use a video/audio connection for your medical care today? Yes.     Patient is a 74 y.o. male with hx of DM II and HTN. BMI=30. He complains of fatigue.     HST 24: LOUIE: 2.3 /h. Supine time was 78 minutes resulting in Supine LOUIE: 10 /h.     TRESSA=1.5 /h; Phu SpO2=90 %.    Watches TV & plays on his cell phone.  Bedtime: 11 PM. Falls asleep at around 1 am. Wakes up 7-8 AM.    ESS:   8    Review of Systems  Constitutional: No fever or chills. No changes in appetite. Fatigue.   ENMT: No sinus congestion, postnasal drip, hoarsness  Cardiovascular: No chest pain, palpitation or legs swelling.    Respiratory: No dyspnea, cough or wheezing.  Gastrointestinal: No  constipation, diarrhea, abdominal pain or acid reflux  Neurology: No headache, weakness, numbness or dizziness.   Musculoskeletal: No joints pain, stiffness or swelling.   Psychiatry: No depression, anxiety or irritability.   Hem/Lymphatic: No swollen glands or easy bruising.  Integumentary: No rash.  Endocrinology: No excessive thirst, cold or warm intolerance.   Urinary: No dysuria, bloody urine or frequent urination.     History  Past Medical History:   Diagnosis Date    Acid reflux     Anesthesia     FAMILY AND Pt REPORT Pt WAS COMBATIVE AFTER DENTAL PROCEDURE    Dizziness     DJD (degenerative joint disease)     LEFT SHOULDER; LEFT KNEE= MRI    Essential (primary) hypertension     Gastro-esophageal reflux disease without esophagitis     High cholesterol     HTN (hypertension)     Iron deficiency anemia, unspecified     Left knee pain     Mixed hyperlipidemia     Neuropathy     Other fatigue     Partial tear of left rotator cuff     PUD (peptic ulcer disease)     Pure hypercholesterolemia     INCREASED CK WITH ALL    SOB (shortness of breath) 2021    HAD ECHO/ STRESS NO PROBLEMS SINCE THEN. NO CARDIOLOGIST- DENIED CP/SOB    Type 2 diabetes mellitus without complication     ON MEDS- DOESN'T CHECK BG DAILY AT HOME    Unspecified osteoarthritis, unspecified site    ,   Past Surgical History:   Procedure Laterality Date    CIRCUMCISION      KNEE ACL RECONSTRUCTION      left    KNEE SURGERY Left     CLEAN     TOOTH EXTRACTION      TOTAL KNEE ARTHROPLASTY Left 10/24/2023    Procedure: LEFT TOTAL KNEE ARTHROPLASTY WITH DAVID ROBOT;  Surgeon: Gurinder Figueroa MD;  Location: [facility] MAIN OR;  Service: Robotics - Ortho;  Laterality: Left;   ,   Family History   Problem Relation Age of Onset    Colon cancer Other         X2 HE BELIEVES    Malig Hyperthermia Neg Hx    ,   Social History     Socioeconomic History    Marital status:    Tobacco Use    Smoking status: Never     Passive exposure: Never    Smokeless tobacco:  Never   Vaping Use    Vaping status: Never Used   Substance and Sexual Activity    Alcohol use: No    Drug use: No    Sexual activity: Defer     E-cigarette/Vaping    E-cigarette/Vaping Use Never User     Passive Exposure No     Counseling Given Yes      E-cigarette/Vaping Substances    Nicotine No     THC No     CBD No     Flavoring No      E-cigarette/Vaping Devices    Disposable No     Pre-filled or Refillable Cartridge No     Refillable Tank No     Pre-filled Pod No          , and Allergies:  Patient has no known allergies.    Medications:    Current Outpatient Medications:     amLODIPine-valsartan (EXFORGE)  MG per tablet, TAKE 1 TABLET BY MOUTH EVERY DAY, Disp: 90 tablet, Rfl: 1    Blood Glucose Calibration (Accu-Chek Guide Control) liquid, 1 each by In Vitro route Daily As Needed (machine needing controled)., Disp: 1 each, Rfl: 5    cetirizine (zyrTEC) 10 MG tablet, Take 1 tablet by mouth Daily., Disp: 90 tablet, Rfl: 1    diclofenac (VOLTAREN) 0.1 % ophthalmic solution, Administer 1 drop into the left eye 4 (Four) Times a Day., Disp: 2.5 mL, Rfl: 0    doxazosin (CARDURA) 2 MG tablet, TAKE 1 TABLET BY MOUTH EVERY DAY AT NIGHT, Disp: 90 tablet, Rfl: 1    ferrous sulfate 325 (65 FE) MG tablet, Take 1 tablet by mouth 3 (Three) Times a Week., Disp: , Rfl:     meloxicam (MOBIC) 15 MG tablet, Take 1 tablet by mouth Daily., Disp: 90 tablet, Rfl: 1    metFORMIN ER (GLUCOPHAGE-XR) 500 MG 24 hr tablet, TAKE 2 TABLETS BY MOUTH DAILY WITH BREAKFAST., Disp: 180 tablet, Rfl: 1    metoprolol succinate XL (TOPROL-XL) 25 MG 24 hr tablet, TAKE 1/2 TABLET BY MOUTH DAILY, Disp: 45 tablet, Rfl: 1    multivitamin with minerals tablet tablet, Take 1 tablet by mouth Daily., Disp: , Rfl:     omeprazole (priLOSEC) 40 MG capsule, TAKE 1 CAPSULE BY MOUTH TWICE A DAY, Disp: 180 capsule, Rfl: 1    pravastatin (PRAVACHOL) 20 MG tablet, TAKE 1 TABLET BY MOUTH EVERY DAY, Disp: 90 tablet, Rfl: 1    sertraline (ZOLOFT) 50 MG tablet, TAKE  1 TABLET BY MOUTH EVERY DAY, Disp: 90 tablet, Rfl: 1    tadalafil (CIALIS) 10 MG tablet, Take 1 tablet by mouth Daily As Needed for Erectile Dysfunction. May take up to 2 tablets (20 mg max), Disp: 30 tablet, Rfl: 3    traZODone (DESYREL) 50 MG tablet, TAKE 2 TABLETS BY MOUTH EVERY NIGHT, Disp: 180 tablet, Rfl: 1    triamcinolone (KENALOG) 0.5 % cream, Apply 1 Application topically to the appropriate area as directed 3 (Three) Times a Day., Disp: 30 g, Rfl: 1      Objective   Vital Signs:  There were no vitals filed for this visit.    There is no height or weight on file to calculate BMI.        Physical Exam:        Limited exam via Telehealth.    -General appearance: Not in acute distress.  -Eyes: Injected conjunctiva. EOMI.  -Neck: No visible thyromegaly.  -ENMT: Rios score 3.  -Chest: Symmetrical expansion. Non labored breathing.  -Musculoskeletal: No cyanosis, clubbing or edema  -Neurologic: Conscious, alert, oriented x3. Moves all extremities  -Integumentary: No rash on face or arms  -Psychiatry: Appropriate mood and affect.      Diagnostic data:  Lab Results   Component Value Date    HGBA1C 6.40 (H) 10/23/2024     Total Cholesterol   Date Value Ref Range Status   05/23/2024 140 0 - 200 mg/dL Final     Triglycerides   Date Value Ref Range Status   05/23/2024 106 0 - 150 mg/dL Final     HDL Cholesterol   Date Value Ref Range Status   05/23/2024 52 40 - 60 mg/dL Final     Hemoglobin   Date Value Ref Range Status   10/23/2024 12.2 (L) 13.0 - 17.7 g/dL Final     CO2   Date Value Ref Range Status   10/23/2024 26.4 22.0 - 29.0 mmol/L Final        Assessment   Snoring  Obesity, BMI 30  Insomnia, mostly sleep onset.  Inadequate sleep  HTN      Plan:  Discussed the results of the sleep study which was negative for sleep apnea.  Reported the possibility of sleep apnea for which a PSG would be recommended.  However he does not feel like he has any issues staying asleep and does not think he has sleep  apnea.    Discussed sleep hygiene.  Recommended to avoid staying in bed more than 30 minutes if unable to fall asleep and also avoid using any electronics in bed.    Counseled for weight loss.            Michelle Schulte MD  01/31/25  14:54 EST    This note was dictated utilizing Dragon dictation      Time: 23 min

## 2025-02-02 ENCOUNTER — HOSPITAL ENCOUNTER (EMERGENCY)
Facility: HOSPITAL | Age: 75
Discharge: HOME | End: 2025-02-02
Attending: EMERGENCY MEDICINE | Admitting: EMERGENCY MEDICINE
Payer: MEDICARE

## 2025-02-02 VITALS
DIASTOLIC BLOOD PRESSURE: 77 MMHG | OXYGEN SATURATION: 98 % | HEIGHT: 78 IN | HEART RATE: 64 BPM | WEIGHT: 270.73 LBS | BODY MASS INDEX: 31.32 KG/M2 | TEMPERATURE: 98.4 F | RESPIRATION RATE: 18 BRPM | SYSTOLIC BLOOD PRESSURE: 150 MMHG

## 2025-02-02 DIAGNOSIS — J10.1 INFLUENZA A: Primary | ICD-10-CM

## 2025-02-02 LAB
FLUAV SUBTYP SPEC NAA+PROBE: DETECTED
FLUBV RNA ISLT QL NAA+PROBE: NOT DETECTED
RSV RNA NPH QL NAA+NON-PROBE: NOT DETECTED
SARS-COV-2 RNA RESP QL NAA+PROBE: NOT DETECTED

## 2025-02-02 PROCEDURE — 99283 EMERGENCY DEPT VISIT LOW MDM: CPT

## 2025-02-02 PROCEDURE — 87637 SARSCOV2&INF A&B&RSV AMP PRB: CPT | Performed by: EMERGENCY MEDICINE

## 2025-02-02 RX ORDER — ONDANSETRON 4 MG/1
4 TABLET, ORALLY DISINTEGRATING ORAL 4 TIMES DAILY PRN
Qty: 20 TABLET | Refills: 0 | Status: SHIPPED | OUTPATIENT
Start: 2025-02-02

## 2025-02-02 RX ORDER — BENZONATATE 200 MG/1
200 CAPSULE ORAL 3 TIMES DAILY PRN
Qty: 20 CAPSULE | Refills: 0 | Status: SHIPPED | OUTPATIENT
Start: 2025-02-02

## 2025-02-02 NOTE — DISCHARGE INSTRUCTIONS
Make sure to stay hydrated by drinking plenty of fluids and get some rest.  Eat frequent, small meals of bland food throughout the day.  Avoid foods that are heavy, greasy, fatty, spicy, or acidic.    Treat your fevers and pain with Tylenol.  I would also recommend that you  some Cepacol lozenges over-the-counter, which can help to soothe your sore throat.  Chloraseptic spray is also another product you can use for sore throat.    Return to emergency department for worsening symptoms such as chest pain or shortness of breath.

## 2025-02-02 NOTE — ED PROVIDER NOTES
Time: 9:01 AM EST  Date of encounter:  2/2/2025  Independent Historian/Clinical History and Information was obtained by:   Patient    History is limited by: N/A    Chief Complaint: Flulike symptoms      History of Present Illness:  Patient is a 74 y.o. year old male who presents to the emergency department for evaluation of flulike symptoms for the past 3 days.  Patient reports sore throat, cough, nasal congestion, headache, body aches, chills.  Patient states he is unsure if he has had a fever.  Patient denies sick contacts.  Patient denies chest pain or shortness of breath.      Patient Care Team  Primary Care Provider: Aleksandr Olmstead MD    Past Medical History:     No Known Allergies  Past Medical History:   Diagnosis Date    Acid reflux     Anesthesia     FAMILY AND Pt REPORT Pt WAS COMBATIVE AFTER DENTAL PROCEDURE    Dizziness     DJD (degenerative joint disease)     LEFT SHOULDER; LEFT KNEE= MRI    Essential (primary) hypertension     Gastro-esophageal reflux disease without esophagitis     High cholesterol     HTN (hypertension)     Iron deficiency anemia, unspecified     Left knee pain     Mixed hyperlipidemia     Neuropathy     Other fatigue     Partial tear of left rotator cuff     PUD (peptic ulcer disease)     Pure hypercholesterolemia     INCREASED CK WITH ALL    SOB (shortness of breath) 2021    HAD ECHO/ STRESS NO PROBLEMS SINCE THEN. NO CARDIOLOGIST- DENIED CP/SOB    Type 2 diabetes mellitus without complication     ON MEDS- DOESN'T CHECK BG DAILY AT HOME    Unspecified osteoarthritis, unspecified site      Past Surgical History:   Procedure Laterality Date    CIRCUMCISION      KNEE ACL RECONSTRUCTION      left    KNEE SURGERY Left     CLEAN     TOOTH EXTRACTION      TOTAL KNEE ARTHROPLASTY Left 10/24/2023    Procedure: LEFT TOTAL KNEE ARTHROPLASTY WITH DAVID ROBOT;  Surgeon: Gurinder Figueroa MD;  Location: [facility] MAIN OR;  Service: Robotics - Ortho;  Laterality: Left;     Family History   Problem  Relation Age of Onset    Colon cancer Other         X2 HE BELIEVES    Malig Hyperthermia Neg Hx        Home Medications:  Prior to Admission medications    Medication Sig Start Date End Date Taking? Authorizing Provider   amLODIPine-valsartan (EXFORGE)  MG per tablet TAKE 1 TABLET BY MOUTH EVERY DAY 1/10/25   Aleksandr Olmstead MD   Blood Glucose Calibration (Accu-Chek Guide Control) liquid 1 each by In Vitro route Daily As Needed (machine needing controled). 1/6/23   Aleksandr Olmstead MD   cetirizine (zyrTEC) 10 MG tablet Take 1 tablet by mouth Daily. 12/6/23   Aleksandr Olmstead MD   diclofenac (VOLTAREN) 0.1 % ophthalmic solution Administer 1 drop into the left eye 4 (Four) Times a Day. 8/3/24   Estelita Griffin APRN   doxazosin (CARDURA) 2 MG tablet TAKE 1 TABLET BY MOUTH EVERY DAY AT NIGHT 8/19/24   Aleksandr Olmstead MD   ferrous sulfate 325 (65 FE) MG tablet Take 1 tablet by mouth 3 (Three) Times a Week.    ProviderKostas MD   meloxicam (MOBIC) 15 MG tablet Take 1 tablet by mouth Daily. 1/9/25   Aleksandr Olmstead MD   metFORMIN ER (GLUCOPHAGE-XR) 500 MG 24 hr tablet TAKE 2 TABLETS BY MOUTH DAILY WITH BREAKFAST. 8/19/24   Aleksandr Olmstead MD   metoprolol succinate XL (TOPROL-XL) 25 MG 24 hr tablet TAKE 1/2 TABLET BY MOUTH DAILY 7/22/24   Aleksandr Olmstead MD   multivitamin with minerals tablet tablet Take 1 tablet by mouth Daily.    ProviderKostas MD   omeprazole (priLOSEC) 40 MG capsule TAKE 1 CAPSULE BY MOUTH TWICE A DAY 7/24/24   Aleksandr Olmstead MD   pravastatin (PRAVACHOL) 20 MG tablet TAKE 1 TABLET BY MOUTH EVERY DAY 1/7/25   Aleksandr Olmstead MD   sertraline (ZOLOFT) 50 MG tablet TAKE 1 TABLET BY MOUTH EVERY DAY 12/26/24   Aleksandr Olmstead MD   tadalafil (CIALIS) 10 MG tablet Take 1 tablet by mouth Daily As Needed for Erectile Dysfunction. May take up to 2 tablets (20 mg max) 1/22/25   Delores Murphy APRN   traZODone (DESYREL) 50 MG tablet TAKE 2 TABLETS BY MOUTH EVERY NIGHT 8/19/24   Ishan,  "MD Aleksandr   triamcinolone (KENALOG) 0.5 % cream Apply 1 Application topically to the appropriate area as directed 3 (Three) Times a Day. 7/12/24   Aleksandr Olmstead MD        Social History:   Social History     Tobacco Use    Smoking status: Never     Passive exposure: Never    Smokeless tobacco: Never   Vaping Use    Vaping status: Never Used   Substance Use Topics    Alcohol use: No    Drug use: No         Review of Systems:  Review of Systems   Constitutional:  Positive for chills. Negative for fever.   HENT:  Positive for congestion and sore throat. Negative for trouble swallowing.    Respiratory:  Positive for cough. Negative for shortness of breath.    Cardiovascular:  Negative for chest pain.   Gastrointestinal:  Negative for abdominal pain.   Endocrine: Negative for polyuria.   Genitourinary:  Negative for dysuria.   Musculoskeletal:  Positive for myalgias. Negative for neck pain.   Skin:  Negative for rash.   Neurological:  Positive for headaches.   All other systems reviewed and are negative.       Physical Exam:  /57   Pulse 66   Temp 98.7 °F (37.1 °C)   Resp 18   Ht 198.1 cm (78\")   Wt 123 kg (270 lb 11.6 oz)   SpO2 100%   BMI 31.29 kg/m²     Physical Exam  Vitals and nursing note reviewed.   Constitutional:       Appearance: Normal appearance. He is not ill-appearing or toxic-appearing.   HENT:      Head: Normocephalic.      Nose: Nose normal. Congestion present.      Mouth/Throat:      Pharynx: Posterior oropharyngeal erythema present. No pharyngeal swelling or oropharyngeal exudate.   Eyes:      Extraocular Movements: Extraocular movements intact.      Conjunctiva/sclera: Conjunctivae normal.      Pupils: Pupils are equal, round, and reactive to light.   Cardiovascular:      Rate and Rhythm: Normal rate.      Pulses: Normal pulses.   Pulmonary:      Effort: Pulmonary effort is normal.   Abdominal:      General: Abdomen is flat. There is no distension.      Palpations: Abdomen is soft. "   Musculoskeletal:      Cervical back: Normal range of motion and neck supple.   Skin:     General: Skin is warm and dry.      Capillary Refill: Capillary refill takes less than 2 seconds.   Neurological:      General: No focal deficit present.      Mental Status: He is alert and oriented to person, place, and time.   Psychiatric:         Mood and Affect: Mood normal.              Medical Decision Making:      Comorbidities that affect care:    Diabetes, Hypertension    External Notes reviewed:    Previous Clinic Note: Urology office visit from 1/22/2025      The following orders were placed and all results were independently analyzed by me:  Orders Placed This Encounter   Procedures    COVID PRE-OP / PRE-PROCEDURE SCREENING ORDER (NO ISOLATION) - Swab, Nasopharynx    COVID-19, FLU A/B, RSV PCR 1 HR TAT - Swab, Nasopharynx       Medications Given in the Emergency Department:  Medications - No data to display     ED Course:         Labs:    Lab Results (last 24 hours)       Procedure Component Value Units Date/Time    COVID PRE-OP / PRE-PROCEDURE SCREENING ORDER (NO ISOLATION) - Swab, Nasopharynx [[id number]]  (Abnormal) Collected: 02/02/25 0712    Specimen: Swab from Nasopharynx Updated: 02/02/25 0828    Narrative:      The following orders were created for panel order COVID PRE-OP / PRE-PROCEDURE SCREENING ORDER (NO ISOLATION) - Swab, Nasopharynx.  Procedure                               Abnormality         Status                     ---------                               -----------         ------                     COVID-19, FLU A/B, RSV P...[[id number]]  Abnormal            Final result                 Please view results for these tests on the individual orders.    COVID-19, FLU A/B, RSV PCR 1 HR TAT - Swab, Nasopharynx [[id number]]  (Abnormal) Collected: 02/02/25 0712    Specimen: Swab from Nasopharynx Updated: 02/02/25 0828     COVID19 Not Detected     Influenza A PCR Detected     Influenza B PCR Not Detected      RSV, PCR Not Detected    Narrative:      Fact sheet for providers: https://www.fda.gov/media/364963/download    Fact sheet for patients: https://www.fda.gov/media/481922/download    Test performed by PCR.             Imaging:    No Radiology Exams Resulted Within Past 24 Hours      Differential Diagnosis and Discussion:    Viral syndrome: Differential diagnosis includes but is not limited to influenza, common cold, COVID-19, RSV, adenovirus, enteroviruses, herpes virus, hepatitis virus, measles, mumps, rubella, dengue fever, and possible bacterial infection.    PROCEDURES:    Labs were collected in the emergency department and all labs were reviewed and interpreted by me.    No orders to display       Procedures    MDM     Amount and/or Complexity of Data Reviewed  Clinical lab tests: reviewed       The patient is resting comfortably and feels better, is alert and in no distress. Influenza swab is positive for influenza A.  The patient is not within the window for Tamiflu as he has had the symptoms for the past 3 days. On re-examination the patient does not appear toxic and has no meningeal signs, and there's no intractable vomiting, respiratory distress and no apparent pain. Based on the history, exam, diagnostic testing and reassessment, the patient has no signs of meningitis, significant pneumonia, pyelonephritis, sepsis or other acute serious bacterial infections, or other significant pathology to warrant further testing, continued ED treatment, admission or specialist evaluation. The patient's vital signs have been stable. The patient's condition is stable and is appropriate for discharge. The patient was counseled to return to the ED for re-evaluation for worsening cough, shortness of breath, uncontrollable headache, altered mental status, or any symptoms which cause them concern. The patient will pursue further outpatient evaluation with the primary care physician or other designated or consultant  physician as indicated in the discharge instructions.                Patient Care Considerations:    ANTIVIRAL: I considered prescribing antiviral medication as an outpatient, however patient is outside the window for Tamiflu.      Consultants/Shared Management Plan:    None    Social Determinants of Health:    Patient is independent, reliable, and has access to care.       Disposition and Care Coordination:    Discharged: The patient is suitable and stable for discharge with no need for consideration of admission.    I have explained the patient´s condition, diagnoses and treatment plan based on the information available to me at this time. I have answered questions and addressed any concerns. The patient has a good  understanding of the patient´s diagnosis, condition, and treatment plan as can be expected at this point. The vital signs have been stable. The patient´s condition is stable and appropriate for discharge from the emergency department.      The patient will pursue further outpatient evaluation with the primary care physician or other designated or consulting physician as outlined in the discharge instructions. They are agreeable to this plan of care and follow-up instructions have been explained in detail. The patient has received these instructions in written format and has expressed an understanding of the discharge instructions. The patient is aware that any significant change in condition or worsening of symptoms should prompt an immediate return to this or the closest emergency department or call to 911.  I have explained discharge medications and the need for follow up with the patient/caretakers. This was also printed in the discharge instructions. Patient was discharged with the following medications and follow up:      Medication List        New Prescriptions      benzonatate 200 MG capsule  Commonly known as: TESSALON  Take 1 capsule by mouth 3 (Three) Times a Day As Needed for Cough.      ondansetron ODT 4 MG disintegrating tablet  Commonly known as: ZOFRAN-ODT  Place 1 tablet on the tongue 4 (Four) Times a Day As Needed for Nausea or Vomiting.               Where to Get Your Medications        These medications were sent to [facility]/pharmacy #[id number] - [place], [place] - [address] - [phone]  - [phone] FX  [address] [place] [address]      Hours: 24-hours Phone: [phone]   benzonatate 200 MG capsule  ondansetron ODT 4 MG disintegrating tablet      Aleksandr Olmstead MD  [address] [facility]  [place] [address]  [phone]    Call in 2 days         Final diagnoses:   Influenza A        ED Disposition       ED Disposition   Discharge    Condition   Stable    Comment   --               This medical record created using voice recognition software.             Antonia Harkins APRN  02/02/25 0912

## 2025-02-10 RX ORDER — METFORMIN HYDROCHLORIDE 500 MG/1
1000 TABLET, EXTENDED RELEASE ORAL
Qty: 180 TABLET | Refills: 1 | Status: SHIPPED | OUTPATIENT
Start: 2025-02-10

## 2025-02-10 RX ORDER — TRAZODONE HYDROCHLORIDE 50 MG/1
100 TABLET, FILM COATED ORAL NIGHTLY
Qty: 180 TABLET | Refills: 1 | Status: SHIPPED | OUTPATIENT
Start: 2025-02-10

## 2025-02-12 RX ORDER — DOXAZOSIN 2 MG/1
2 TABLET ORAL
Qty: 90 TABLET | Refills: 1 | Status: SHIPPED | OUTPATIENT
Start: 2025-02-12

## 2025-03-03 ENCOUNTER — LAB (OUTPATIENT)
Dept: LAB | Facility: HOSPITAL | Age: 75
End: 2025-03-03
Payer: MEDICARE

## 2025-03-03 DIAGNOSIS — I10 ESSENTIAL HYPERTENSION: ICD-10-CM

## 2025-03-03 DIAGNOSIS — E78.2 MIXED HYPERLIPIDEMIA: ICD-10-CM

## 2025-03-03 DIAGNOSIS — D50.9 IRON DEFICIENCY ANEMIA, UNSPECIFIED IRON DEFICIENCY ANEMIA TYPE: ICD-10-CM

## 2025-03-03 DIAGNOSIS — E11.9 TYPE 2 DIABETES MELLITUS WITHOUT COMPLICATION, WITHOUT LONG-TERM CURRENT USE OF INSULIN: ICD-10-CM

## 2025-03-03 LAB
ALBUMIN SERPL-MCNC: 3.9 G/DL (ref 3.5–5.2)
ALBUMIN/GLOB SERPL: 1.3 G/DL
ALP SERPL-CCNC: 70 U/L (ref 39–117)
ALT SERPL W P-5'-P-CCNC: 10 U/L (ref 1–41)
ANION GAP SERPL CALCULATED.3IONS-SCNC: 10.1 MMOL/L (ref 5–15)
AST SERPL-CCNC: 20 U/L (ref 1–40)
BASOPHILS # BLD AUTO: 0.02 10*3/MM3 (ref 0–0.2)
BASOPHILS NFR BLD AUTO: 0.4 % (ref 0–1.5)
BILIRUB SERPL-MCNC: 0.3 MG/DL (ref 0–1.2)
BUN SERPL-MCNC: 16 MG/DL (ref 8–23)
BUN/CREAT SERPL: 12 (ref 7–25)
CALCIUM SPEC-SCNC: 10 MG/DL (ref 8.6–10.5)
CHLORIDE SERPL-SCNC: 108 MMOL/L (ref 98–107)
CHOLEST SERPL-MCNC: 138 MG/DL (ref 0–200)
CO2 SERPL-SCNC: 25.9 MMOL/L (ref 22–29)
CREAT SERPL-MCNC: 1.33 MG/DL (ref 0.76–1.27)
DEPRECATED RDW RBC AUTO: 42 FL (ref 37–54)
EGFRCR SERPLBLD CKD-EPI 2021: 55.4 ML/MIN/1.73
EOSINOPHIL # BLD AUTO: 0.25 10*3/MM3 (ref 0–0.4)
EOSINOPHIL NFR BLD AUTO: 5.1 % (ref 0.3–6.2)
ERYTHROCYTE [DISTWIDTH] IN BLOOD BY AUTOMATED COUNT: 12.8 % (ref 12.3–15.4)
FERRITIN SERPL-MCNC: 258 NG/ML (ref 30–400)
GLOBULIN UR ELPH-MCNC: 2.9 GM/DL
GLUCOSE SERPL-MCNC: 130 MG/DL (ref 65–99)
HBA1C MFR BLD: 6.7 % (ref 4.8–5.6)
HCT VFR BLD AUTO: 36.3 % (ref 37.5–51)
HDLC SERPL-MCNC: 55 MG/DL (ref 40–60)
HGB BLD-MCNC: 12.4 G/DL (ref 13–17.7)
IMM GRANULOCYTES # BLD AUTO: 0.01 10*3/MM3 (ref 0–0.05)
IMM GRANULOCYTES NFR BLD AUTO: 0.2 % (ref 0–0.5)
IRON 24H UR-MRATE: 81 MCG/DL (ref 59–158)
IRON SATN MFR SERPL: 25 % (ref 20–50)
LDLC SERPL CALC-MCNC: 68 MG/DL (ref 0–100)
LDLC/HDLC SERPL: 1.23 {RATIO}
LYMPHOCYTES # BLD AUTO: 1.48 10*3/MM3 (ref 0.7–3.1)
LYMPHOCYTES NFR BLD AUTO: 30.1 % (ref 19.6–45.3)
MCH RBC QN AUTO: 31.1 PG (ref 26.6–33)
MCHC RBC AUTO-ENTMCNC: 34.2 G/DL (ref 31.5–35.7)
MCV RBC AUTO: 91 FL (ref 79–97)
MONOCYTES # BLD AUTO: 0.46 10*3/MM3 (ref 0.1–0.9)
MONOCYTES NFR BLD AUTO: 9.3 % (ref 5–12)
NEUTROPHILS NFR BLD AUTO: 2.7 10*3/MM3 (ref 1.7–7)
NEUTROPHILS NFR BLD AUTO: 54.9 % (ref 42.7–76)
NRBC BLD AUTO-RTO: 0 /100 WBC (ref 0–0.2)
PLATELET # BLD AUTO: 231 10*3/MM3 (ref 140–450)
PMV BLD AUTO: 10.2 FL (ref 6–12)
POTASSIUM SERPL-SCNC: 4.6 MMOL/L (ref 3.5–5.2)
PROT SERPL-MCNC: 6.8 G/DL (ref 6–8.5)
RBC # BLD AUTO: 3.99 10*6/MM3 (ref 4.14–5.8)
SODIUM SERPL-SCNC: 144 MMOL/L (ref 136–145)
TIBC SERPL-MCNC: 319 MCG/DL (ref 298–536)
TRANSFERRIN SERPL-MCNC: 214 MG/DL (ref 200–360)
TRIGL SERPL-MCNC: 78 MG/DL (ref 0–150)
VLDLC SERPL-MCNC: 15 MG/DL (ref 5–40)
WBC NRBC COR # BLD AUTO: 4.92 10*3/MM3 (ref 3.4–10.8)

## 2025-03-03 PROCEDURE — 83036 HEMOGLOBIN GLYCOSYLATED A1C: CPT

## 2025-03-03 PROCEDURE — 82728 ASSAY OF FERRITIN: CPT

## 2025-03-03 PROCEDURE — 80053 COMPREHEN METABOLIC PANEL: CPT

## 2025-03-03 PROCEDURE — 85025 COMPLETE CBC W/AUTO DIFF WBC: CPT

## 2025-03-03 PROCEDURE — 84466 ASSAY OF TRANSFERRIN: CPT

## 2025-03-03 PROCEDURE — 80061 LIPID PANEL: CPT

## 2025-03-03 PROCEDURE — 36415 COLL VENOUS BLD VENIPUNCTURE: CPT

## 2025-03-03 PROCEDURE — 83540 ASSAY OF IRON: CPT

## 2025-03-05 ENCOUNTER — OFFICE VISIT (OUTPATIENT)
Age: 75
End: 2025-03-05
Payer: MEDICARE

## 2025-03-05 VITALS
HEART RATE: 62 BPM | WEIGHT: 273.2 LBS | SYSTOLIC BLOOD PRESSURE: 136 MMHG | HEIGHT: 78 IN | TEMPERATURE: 98 F | BODY MASS INDEX: 31.61 KG/M2 | OXYGEN SATURATION: 98 % | DIASTOLIC BLOOD PRESSURE: 68 MMHG

## 2025-03-05 DIAGNOSIS — Z00.00 WELL ADULT EXAM: ICD-10-CM

## 2025-03-05 DIAGNOSIS — D50.8 IRON DEFICIENCY ANEMIA SECONDARY TO INADEQUATE DIETARY IRON INTAKE: ICD-10-CM

## 2025-03-05 DIAGNOSIS — E11.9 TYPE 2 DIABETES MELLITUS WITHOUT COMPLICATION, WITHOUT LONG-TERM CURRENT USE OF INSULIN: ICD-10-CM

## 2025-03-05 DIAGNOSIS — I10 ESSENTIAL HYPERTENSION: Primary | ICD-10-CM

## 2025-03-05 DIAGNOSIS — I77.810 ACQUIRED DILATION OF ASCENDING AORTA AND AORTIC ROOT: ICD-10-CM

## 2025-03-05 DIAGNOSIS — N18.31 STAGE 3A CHRONIC KIDNEY DISEASE: ICD-10-CM

## 2025-03-05 DIAGNOSIS — E78.2 MIXED HYPERLIPIDEMIA: ICD-10-CM

## 2025-03-05 PROBLEM — Z96.652 S/P TKR (TOTAL KNEE REPLACEMENT), LEFT: Status: RESOLVED | Noted: 2023-11-28 | Resolved: 2025-03-05

## 2025-03-05 NOTE — ASSESSMENT & PLAN NOTE
LDL is stable at 68 as of his 3/25 OV.  This is certainly at goal for primary prevention, he is on low to moderate dose pravastatin and can continue same.

## 2025-03-05 NOTE — ASSESSMENT & PLAN NOTE
Patient had a repeat echo in '21 that was read as stable, but has been sometime obviously, in the heart murmur is a little more pronounced.  We will get a repeat echocardiogram, and I will review with radiology in regards to the chest CT he had for the calcium score last year.

## 2025-03-05 NOTE — ASSESSMENT & PLAN NOTE
Blood pressure remains well-controlled his pulse is right around 60 as of his 3/25 OV.  Patient is on full dose amlodipine/valsartan along with low doses of doxazosin and metoprolol, stable to continue same.

## 2025-03-05 NOTE — ASSESSMENT & PLAN NOTE
Hemoglobin is baseline for him at 12.4 as of his 3/25 OV.  His iron studies are normal, so he can continue with his Monday Wednesday Friday dosing of over-the-counter iron.

## 2025-03-05 NOTE — PROGRESS NOTES
"Chief Complaint  Hypertension, Follow-up (Pt had labs, he states that this is routine), and Arthritis (Pt states that his fingers are getting worse. )    Subjective          Marcio Enrique presents to [facility]     History of Present Illness:  74-year-old male with underlying hypertension, hyperlipidemia, diabetes, who is coming in 3/25 for routine 4-month follow-up.  We will go over his meds, review his labs in detail, and address any new concerns he may have.    Review of Systems   Constitutional:  Negative for appetite change, fatigue and fever.   HENT:  Negative for congestion and ear pain.    Eyes:  Negative for blurred vision.   Respiratory:  Negative for cough, chest tightness, shortness of breath and wheezing.    Cardiovascular:  Negative for chest pain, palpitations and leg swelling.   Gastrointestinal:  Negative for abdominal pain.   Genitourinary:  Negative for difficulty urinating, dysuria and hematuria.   Musculoskeletal:  Negative for arthralgias and gait problem.   Skin:  Negative for skin lesions.   Neurological:  Negative for syncope, memory problem and confusion.   Psychiatric/Behavioral:  Negative for self-injury and depressed mood.        Objective   Vital Signs:   /68   Pulse 62   Temp 98 °F (36.7 °C) (Oral)   Ht 198.1 cm (77.99\")   Wt 124 kg (273 lb 3.2 oz)   SpO2 98%   BMI 31.58 kg/m²           Physical Exam  Vitals and nursing note reviewed.   Constitutional:       General: He is not in acute distress.     Appearance: Normal appearance. He is not toxic-appearing.   HENT:      Head: Atraumatic.      Right Ear: External ear normal.      Left Ear: External ear normal.      Nose: Nose normal.      Mouth/Throat:      Mouth: Mucous membranes are moist.   Eyes:      General:         Right eye: No discharge.         Left eye: No discharge.      Extraocular Movements: Extraocular movements intact.      Pupils: Pupils are equal, round, and reactive to " light.   Cardiovascular:      Rate and Rhythm: Normal rate and regular rhythm.      Pulses: Normal pulses.      Heart sounds: Normal heart sounds. No murmur heard.     No gallop.   Pulmonary:      Effort: Pulmonary effort is normal. No respiratory distress.      Breath sounds: No wheezing, rhonchi or rales.   Abdominal:      General: There is no distension.      Palpations: Abdomen is soft. There is no mass.      Tenderness: There is no abdominal tenderness. There is no guarding.   Musculoskeletal:         General: No swelling or tenderness.      Cervical back: No tenderness.      Right lower leg: No edema.      Left lower leg: No edema.   Skin:     General: Skin is warm and dry.      Findings: No rash.   Neurological:      General: No focal deficit present.      Mental Status: He is alert and oriented to person, place, and time. Mental status is at baseline.      Motor: No weakness.      Gait: Gait normal.   Psychiatric:         Mood and Affect: Mood normal.         Thought Content: Thought content normal.          Result Review :   The following data was reviewed by: Aleksandr Olmstead MD on 08/23/2021:                  Assessment and Plan    Diagnoses and all orders for this visit:    1. Essential hypertension (Primary)  Assessment & Plan:  Blood pressure remains well-controlled his pulse is right around 60 as of his 3/25 OV.  Patient is on full dose amlodipine/valsartan along with low doses of doxazosin and metoprolol, stable to continue same.      2. Type 2 diabetes mellitus without complication, without long-term current use of insulin  Assessment & Plan:  A1c is only 6.7 as of his 10/24 OV.  He is just on low to moderate dose metformin, so certainly appropriate to continue same.    Orders:  -     Hemoglobin A1c; Future    3. Stage 3a chronic kidney disease  Assessment & Plan:  This remains very mild, his GFR is stable at 55, electrolytes are normal, no acidosis.  We are just monitoring this with routine  labs.    Of note, patient is on full dose meloxicam, so we will have to keep this in mind as time passes.    Orders:  -     Basic metabolic panel; Future    4. Mixed hyperlipidemia  Overview:  CT calcium score 6/24:  Left main (LM): 0  Left anterior descending (LAD): 1  Left circumflex (CX): 12  Right coronary artery (RCA): 0  Total 13    Assessment & Plan:   LDL is stable at 68 as of his 3/25 OV.  This is certainly at goal for primary prevention, he is on low to moderate dose pravastatin and can continue same.      5. Iron deficiency anemia secondary to inadequate dietary iron intake  Overview:  Cologuard negative 8/22.    Assessment & Plan:  Hemoglobin is baseline for him at 12.4 as of his 3/25 OV.  His iron studies are normal, so he can continue with his Monday Wednesday Friday dosing of over-the-counter iron.    Orders:  -     CBC & Differential; Future  -     Iron Profile; Future  -     Ferritin; Future    6. Well adult exam  -     TSH; Future  -     T4, free; Future    7. Acquired dilation of ascending aorta and aortic root  Overview:  ASCENDING AORTIC DILATATION=3.8 cm per ECHO 3/18 (d/w nl 3.5 or less, dilated 3.5 to 4.5, aneurysmal is above 4.4, and surgery above 5.5).    Assessment & Plan:  Patient had a repeat echo in '21 that was read as stable, but has been sometime obviously, in the heart murmur is a little more pronounced.  We will get a repeat echocardiogram, and I will review with radiology in regards to the chest CT he had for the calcium score last year.    Orders:  -     Adult Transthoracic Echo Complete W/ Cont if Necessary Per Protocol; Future            BMI is >= 30 and <35. (Class 1 Obesity). The following options were offered after discussion;: exercise counseling/recommendations and nutrition counseling/recommendations     --  --  Older notes:  VISIT 4/21:   ANNUAL MEDICARE WELLNESS EXAM 12/20 = reviewed all forms with pt; no new discoveries.  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  --  DM stable on only 500 qd and that is fine...6.3...6.9 and I d/w bad trend...6.9 is holding on 500 qd only... 7.4 and rec 1000 qd again...7.0...7.4 is due to the steroids, and they are being weaned, so no med changes made 9/19...6.9 is back down off steroids...7.5 and will increase on RTO if same/higher = only on 500 qd recall...6.9 on 1000 qAM as of 8/20 OV---> 7.1 is fine 4/21. (TSH neg 4/21).  --  LIPIDS stable 3/14 w/o treatment...elevated CK without statins anyhow...defer... holding steady...122 and will add statin now...78, but CK up and c/o joints at 5/18 OV, so will hold Pravastatin 10 mg for now...86 is fine...96=ok for now---> 84 in 4/20.  --  HTN with need for checks at home before increase Exforge to max dose as of 2/18 OV... BP stable off HCTZ and on toprol...required increase exforge per Karyn; BP great 4/19 OV despite back on Naproxen past week---> stable 4/21.  --  SYNCOPE 2/18 and to ER for 5 hrs; had neg V/Q; did have aura, so most c/w vasovagal; exam neg; will get ECHO/Dopplers/tilt to complete w/u...ECHO is as below, the Carotids 3/18 were neg, and TILT + for NEUROCARDIOGENIC SYNCOPE=will try to lose HCTZ and add in low dose of toprol; pt to monitor BP and call with results; d/w keep hydrated/heed warning signs...c/o dizziness with rapid head mvmt and rec eval per ENT now...got better on it's own.  ASCENDING AORTIC DILATATION=3.8 cm per ECHO 3/18 (d/w nl 3.5 or less, dilated 3.5 to 4.5, aneurysmal is above 4.4, and surgery above 5.5).  --  DJD with L knee/shoulder requiring MRI's...increased sxs 5/18, so holding statin...c/o knee again 12/18, but doesn't sound  severe; OTC meds for now...I was unaware of bump in BP, so I started Naprosyn back a week ago...on prednisone as of 6/19 OV with Dr Mcgovern following now...ESR down 86 to 9 on prednisone as of 9/19 OV...still being weaned as of 12/19 OV and I d/w add tylenol to help offset the prednisone wean.  --  H PYLORI +...d/w tx plan with prilosec 20 mg/amoxil 1000mg/biaxin 500 mg all bid for 14 days...tolerated this.  GERD/BURPPING was better when he was on PPI for above, so try qod...no c/o 10/17.  GALLSTONES noted per below eval and will eval if persistent.  R ABD PAIN to ER, was nauseated, CT=? liver cysts and L renal lesion, he's concerned, so will get MRI 2/16... MRI 3/17 is with stable cysts.   --  BPH with intol to cardura prior, slow stream but no other sx's...defer for now.   --  --  PSA 2.8 (3/26/2024)  COLON 8/22 = Cologuard negative  PNEUMOVAX #1 12/13, Prevnar '16; rec Hep A 5/18.  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    Follow Up   Return in about 4 months (around 7/5/2025).  Patient was given instructions and counseling regarding his condition or for health maintenance advice. Please see specific information pulled into the AVS if appropriate.

## 2025-03-05 NOTE — ASSESSMENT & PLAN NOTE
This remains very mild, his GFR is stable at 55, electrolytes are normal, no acidosis.  We are just monitoring this with routine labs.    Of note, patient is on full dose meloxicam, so we will have to keep this in mind as time passes.

## 2025-03-05 NOTE — ASSESSMENT & PLAN NOTE
A1c is only 6.7 as of his 10/24 OV.  He is just on low to moderate dose metformin, so certainly appropriate to continue same.

## 2025-03-18 ENCOUNTER — HOSPITAL ENCOUNTER (OUTPATIENT)
Facility: HOSPITAL | Age: 75
Discharge: HOME | End: 2025-03-18
Admitting: INTERNAL MEDICINE
Payer: MEDICARE

## 2025-03-18 DIAGNOSIS — I77.810 ACQUIRED DILATION OF ASCENDING AORTA AND AORTIC ROOT: ICD-10-CM

## 2025-03-18 LAB
AORTIC DIMENSIONLESS INDEX: 0.96 (DI)
ASCENDING AORTA: 4 CM
AV MEAN PRESS GRAD SYS DOP V1V2: 5 MMHG
AV VMAX SYS DOP: 154 CM/SEC
BH CV ECHO MEAS - AO MAX PG: 9 MMHG
BH CV ECHO MEAS - AO ROOT DIAM: 3.5 CM
BH CV ECHO MEAS - AO V2 VTI: 34.1 CM
BH CV ECHO MEAS - AVA(I,D): 3.6 CM2
BH CV ECHO MEAS - EDV(CUBED): 133.4 ML
BH CV ECHO MEAS - EDV(MOD-SP2): 119 ML
BH CV ECHO MEAS - EDV(MOD-SP4): 120 ML
BH CV ECHO MEAS - EF(MOD-SP2): 55.5 %
BH CV ECHO MEAS - EF(MOD-SP4): 60.7 %
BH CV ECHO MEAS - ESV(CUBED): 45.5 ML
BH CV ECHO MEAS - ESV(MOD-SP2): 52.9 ML
BH CV ECHO MEAS - ESV(MOD-SP4): 47.2 ML
BH CV ECHO MEAS - FS: 30.1 %
BH CV ECHO MEAS - IVS/LVPW: 1.04 CM
BH CV ECHO MEAS - IVSD: 1.41 CM
BH CV ECHO MEAS - LA DIMENSION: 5.4 CM
BH CV ECHO MEAS - LAT PEAK E' VEL: 5.4 CM/SEC
BH CV ECHO MEAS - LV DIASTOLIC VOL/BSA (35-75): 46.8 CM2
BH CV ECHO MEAS - LV MASS(C)D: 296.7 GRAMS
BH CV ECHO MEAS - LV MAX PG: 8.5 MMHG
BH CV ECHO MEAS - LV MEAN PG: 4 MMHG
BH CV ECHO MEAS - LV SYSTOLIC VOL/BSA (12-30): 18.4 CM2
BH CV ECHO MEAS - LV V1 MAX: 146 CM/SEC
BH CV ECHO MEAS - LV V1 VTI: 32.6 CM
BH CV ECHO MEAS - LVIDD: 5.1 CM
BH CV ECHO MEAS - LVIDS: 3.6 CM
BH CV ECHO MEAS - LVOT AREA: 3.8 CM2
BH CV ECHO MEAS - LVOT DIAM: 2.2 CM
BH CV ECHO MEAS - LVPWD: 1.36 CM
BH CV ECHO MEAS - MED PEAK E' VEL: 3.6 CM/SEC
BH CV ECHO MEAS - MV A MAX VEL: 72.4 CM/SEC
BH CV ECHO MEAS - MV DEC SLOPE: 183 CM/SEC2
BH CV ECHO MEAS - MV DEC TIME: 0.31 SEC
BH CV ECHO MEAS - MV E MAX VEL: 57.4 CM/SEC
BH CV ECHO MEAS - MV E/A: 0.79
BH CV ECHO MEAS - RVDD: 3.7 CM
BH CV ECHO MEAS - SV(LVOT): 123.9 ML
BH CV ECHO MEAS - SV(MOD-SP2): 66.1 ML
BH CV ECHO MEAS - SV(MOD-SP4): 72.8 ML
BH CV ECHO MEAS - SVI(LVOT): 48.3 ML/M2
BH CV ECHO MEAS - SVI(MOD-SP2): 25.8 ML/M2
BH CV ECHO MEAS - SVI(MOD-SP4): 28.4 ML/M2
BH CV ECHO MEASUREMENTS AVERAGE E/E' RATIO: 12.76
IVRT: 63 MS
LEFT ATRIUM VOLUME INDEX: 32.3 ML/M2
LV EF BIPLANE MOD: 56.9 %

## 2025-03-18 PROCEDURE — 93306 TTE W/DOPPLER COMPLETE: CPT

## 2025-03-31 ENCOUNTER — HOSPITAL ENCOUNTER (OUTPATIENT)
Dept: CT IMAGING | Facility: HOSPITAL | Age: 75
Discharge: HOME | End: 2025-03-31
Admitting: INTERNAL MEDICINE
Payer: MEDICARE

## 2025-03-31 DIAGNOSIS — I77.810 ACQUIRED DILATION OF ASCENDING AORTA AND AORTIC ROOT: ICD-10-CM

## 2025-03-31 PROCEDURE — 25510000001 IOPAMIDOL PER 1 ML: Performed by: INTERNAL MEDICINE

## 2025-03-31 PROCEDURE — 71260 CT THORAX DX C+: CPT

## 2025-03-31 RX ORDER — IOPAMIDOL 755 MG/ML
100 INJECTION, SOLUTION INTRAVASCULAR
Status: COMPLETED | OUTPATIENT
Start: 2025-03-31 | End: 2025-03-31

## 2025-03-31 RX ADMIN — IOPAMIDOL 100 ML: 755 INJECTION, SOLUTION INTRAVENOUS at 09:04

## 2025-04-02 ENCOUNTER — RESULTS FOLLOW-UP (OUTPATIENT)
Age: 75
End: 2025-04-02
Payer: MEDICARE

## 2025-04-09 RX ORDER — METOPROLOL SUCCINATE 25 MG/1
12.5 TABLET, EXTENDED RELEASE ORAL DAILY
Qty: 45 TABLET | Refills: 1 | Status: SHIPPED | OUTPATIENT
Start: 2025-04-09

## 2025-05-19 RX ORDER — OMEPRAZOLE 40 MG/1
40 CAPSULE, DELAYED RELEASE ORAL 2 TIMES DAILY
Qty: 180 CAPSULE | Refills: 1 | Status: SHIPPED | OUTPATIENT
Start: 2025-05-19

## 2025-06-26 ENCOUNTER — TELEPHONE (OUTPATIENT)
Age: 75
End: 2025-06-26
Payer: MEDICARE

## 2025-06-26 DIAGNOSIS — M65.332 TRIGGER MIDDLE FINGER OF LEFT HAND: ICD-10-CM

## 2025-06-26 DIAGNOSIS — M65.331 TRIGGER MIDDLE FINGER OF RIGHT HAND: Primary | ICD-10-CM

## 2025-06-26 NOTE — TELEPHONE ENCOUNTER
Pt stopped by office yesterday and he states that both of his middle fingers are locking up and he had to straighten them out himself. Would you want to refer him to ortho? If so the referral is pended for you. Thanks.

## 2025-06-30 RX ORDER — PRAVASTATIN SODIUM 20 MG
20 TABLET ORAL DAILY
Qty: 90 TABLET | Refills: 3 | Status: SHIPPED | OUTPATIENT
Start: 2025-06-30

## 2025-07-01 RX ORDER — AMLODIPINE AND VALSARTAN 10; 320 MG/1; MG/1
1 TABLET ORAL DAILY
Qty: 90 TABLET | Refills: 3 | Status: SHIPPED | OUTPATIENT
Start: 2025-07-01

## 2025-07-01 RX ORDER — MELOXICAM 15 MG/1
15 TABLET ORAL DAILY
Qty: 90 TABLET | Refills: 3 | Status: SHIPPED | OUTPATIENT
Start: 2025-07-01

## 2025-07-07 ENCOUNTER — LAB (OUTPATIENT)
Dept: LAB | Facility: HOSPITAL | Age: 75
End: 2025-07-07
Payer: MEDICARE

## 2025-07-07 DIAGNOSIS — Z00.00 WELL ADULT EXAM: ICD-10-CM

## 2025-07-07 DIAGNOSIS — E11.9 TYPE 2 DIABETES MELLITUS WITHOUT COMPLICATION, WITHOUT LONG-TERM CURRENT USE OF INSULIN: ICD-10-CM

## 2025-07-07 DIAGNOSIS — N18.31 STAGE 3A CHRONIC KIDNEY DISEASE: ICD-10-CM

## 2025-07-07 DIAGNOSIS — D50.8 IRON DEFICIENCY ANEMIA SECONDARY TO INADEQUATE DIETARY IRON INTAKE: ICD-10-CM

## 2025-07-07 LAB
ANION GAP SERPL CALCULATED.3IONS-SCNC: 10.9 MMOL/L (ref 5–15)
BASOPHILS # BLD AUTO: 0.03 10*3/MM3 (ref 0–0.2)
BASOPHILS NFR BLD AUTO: 0.5 % (ref 0–1.5)
BUN SERPL-MCNC: 15 MG/DL (ref 8–23)
BUN/CREAT SERPL: 11.8 (ref 7–25)
CALCIUM SPEC-SCNC: 9.8 MG/DL (ref 8.6–10.5)
CHLORIDE SERPL-SCNC: 108 MMOL/L (ref 98–107)
CO2 SERPL-SCNC: 25.1 MMOL/L (ref 22–29)
CREAT SERPL-MCNC: 1.27 MG/DL (ref 0.76–1.27)
DEPRECATED RDW RBC AUTO: 40.1 FL (ref 37–54)
EGFRCR SERPLBLD CKD-EPI 2021: 58.2 ML/MIN/1.73
EOSINOPHIL # BLD AUTO: 0.28 10*3/MM3 (ref 0–0.4)
EOSINOPHIL NFR BLD AUTO: 5 % (ref 0.3–6.2)
ERYTHROCYTE [DISTWIDTH] IN BLOOD BY AUTOMATED COUNT: 12.2 % (ref 12.3–15.4)
FERRITIN SERPL-MCNC: 250 NG/ML (ref 30–400)
GLUCOSE SERPL-MCNC: 145 MG/DL (ref 65–99)
HBA1C MFR BLD: 6.7 % (ref 4.8–5.6)
HCT VFR BLD AUTO: 37 % (ref 37.5–51)
HGB BLD-MCNC: 12.6 G/DL (ref 13–17.7)
IMM GRANULOCYTES # BLD AUTO: 0.01 10*3/MM3 (ref 0–0.05)
IMM GRANULOCYTES NFR BLD AUTO: 0.2 % (ref 0–0.5)
IRON 24H UR-MRATE: 77 MCG/DL (ref 59–158)
IRON SATN MFR SERPL: 23 % (ref 20–50)
LYMPHOCYTES # BLD AUTO: 1.92 10*3/MM3 (ref 0.7–3.1)
LYMPHOCYTES NFR BLD AUTO: 34.5 % (ref 19.6–45.3)
MCH RBC QN AUTO: 30.9 PG (ref 26.6–33)
MCHC RBC AUTO-ENTMCNC: 34.1 G/DL (ref 31.5–35.7)
MCV RBC AUTO: 90.7 FL (ref 79–97)
MONOCYTES # BLD AUTO: 0.45 10*3/MM3 (ref 0.1–0.9)
MONOCYTES NFR BLD AUTO: 8.1 % (ref 5–12)
NEUTROPHILS NFR BLD AUTO: 2.88 10*3/MM3 (ref 1.7–7)
NEUTROPHILS NFR BLD AUTO: 51.7 % (ref 42.7–76)
NRBC BLD AUTO-RTO: 0 /100 WBC (ref 0–0.2)
PLATELET # BLD AUTO: 243 10*3/MM3 (ref 140–450)
PMV BLD AUTO: 9.8 FL (ref 6–12)
POTASSIUM SERPL-SCNC: 4.5 MMOL/L (ref 3.5–5.2)
RBC # BLD AUTO: 4.08 10*6/MM3 (ref 4.14–5.8)
SODIUM SERPL-SCNC: 144 MMOL/L (ref 136–145)
T4 FREE SERPL-MCNC: 1.03 NG/DL (ref 0.92–1.68)
TIBC SERPL-MCNC: 331 MCG/DL (ref 298–536)
TRANSFERRIN SERPL-MCNC: 222 MG/DL (ref 200–360)
TSH SERPL DL<=0.05 MIU/L-ACNC: 1.54 UIU/ML (ref 0.27–4.2)
WBC NRBC COR # BLD AUTO: 5.57 10*3/MM3 (ref 3.4–10.8)

## 2025-07-07 PROCEDURE — 84466 ASSAY OF TRANSFERRIN: CPT

## 2025-07-07 PROCEDURE — 84439 ASSAY OF FREE THYROXINE: CPT

## 2025-07-07 PROCEDURE — 82728 ASSAY OF FERRITIN: CPT

## 2025-07-07 PROCEDURE — 83036 HEMOGLOBIN GLYCOSYLATED A1C: CPT

## 2025-07-07 PROCEDURE — 83540 ASSAY OF IRON: CPT

## 2025-07-07 PROCEDURE — 80048 BASIC METABOLIC PNL TOTAL CA: CPT

## 2025-07-07 PROCEDURE — 84443 ASSAY THYROID STIM HORMONE: CPT

## 2025-07-07 PROCEDURE — 85025 COMPLETE CBC W/AUTO DIFF WBC: CPT

## 2025-07-07 PROCEDURE — 36415 COLL VENOUS BLD VENIPUNCTURE: CPT

## 2025-07-11 ENCOUNTER — OFFICE VISIT (OUTPATIENT)
Age: 75
End: 2025-07-11
Payer: MEDICARE

## 2025-07-11 VITALS
BODY MASS INDEX: 30.85 KG/M2 | SYSTOLIC BLOOD PRESSURE: 126 MMHG | HEIGHT: 78 IN | HEART RATE: 58 BPM | DIASTOLIC BLOOD PRESSURE: 76 MMHG | WEIGHT: 266.6 LBS | OXYGEN SATURATION: 98 %

## 2025-07-11 DIAGNOSIS — E11.9 TYPE 2 DIABETES MELLITUS WITHOUT COMPLICATION, WITHOUT LONG-TERM CURRENT USE OF INSULIN: ICD-10-CM

## 2025-07-11 DIAGNOSIS — F51.01 PRIMARY INSOMNIA: ICD-10-CM

## 2025-07-11 DIAGNOSIS — N18.31 STAGE 3A CHRONIC KIDNEY DISEASE: ICD-10-CM

## 2025-07-11 DIAGNOSIS — R53.83 OTHER FATIGUE: ICD-10-CM

## 2025-07-11 DIAGNOSIS — Z00.00 MEDICARE ANNUAL WELLNESS VISIT, SUBSEQUENT: Primary | ICD-10-CM

## 2025-07-11 DIAGNOSIS — I77.810 ACQUIRED DILATION OF ASCENDING AORTA AND AORTIC ROOT: ICD-10-CM

## 2025-07-11 DIAGNOSIS — I10 ESSENTIAL HYPERTENSION: ICD-10-CM

## 2025-07-11 DIAGNOSIS — R06.09 DYSPNEA ON EXERTION: ICD-10-CM

## 2025-07-11 DIAGNOSIS — E78.2 MIXED HYPERLIPIDEMIA: ICD-10-CM

## 2025-07-11 DIAGNOSIS — D50.8 IRON DEFICIENCY ANEMIA SECONDARY TO INADEQUATE DIETARY IRON INTAKE: ICD-10-CM

## 2025-07-11 NOTE — ASSESSMENT & PLAN NOTE
AWV completed 7/25.  Remains very active and independent.  No falls, no hospitalizations.  Discussed with patient previously the need for living will/etc.

## 2025-07-11 NOTE — ASSESSMENT & PLAN NOTE
This is lingering on as of 7/25.    As noted his thyroid function, B12, CBC, CT calcium score, PFTs etc. were all pretty unremarkable.    His issue is more when he tries to be active, but we pretty much rule that out.  He did have a stress test itself couple years ago as well which was normal.    Recommend we get a sleep study for further evaluation as of his 10/24 OV.    ---> As noted above, still an issue as of 7/25.  He was evaluated by the sleep lab and significant sleep apnea was ruled out.    His labs remained stable presently, we just repeated his thyroid function, his hemoglobin is holding at 12.5, he has been at this level for some time.    His symptoms are not just motivational/at rest, he also gets or out when trying to do significant physical activity, so since has been for 5 years since he has had a stress test and he has numerous cardiac risk factors, we will put in for a repeat 1 at this time.

## 2025-07-11 NOTE — ASSESSMENT & PLAN NOTE
His hemoglobin is stable at 12.6 as of his 7/25 OV.  He is on 3 times weekly over-the-counter iron, his iron studies are normal, stable to continue same.

## 2025-07-11 NOTE — ASSESSMENT & PLAN NOTE
This is still very mild, his GFR is 58 as of 7/25 OV.  His electrolytes are fine and there is no acidosis.    He does require daily full dose meloxicam, so this will need to be addressed if his kidney function trends down consistently on's.

## 2025-07-11 NOTE — ASSESSMENT & PLAN NOTE
Blood pressure remains well-controlled his pulse is right around 60 as of his 7/25 OV.  Patient is on full dose amlodipine/valsartan along with low doses of doxazosin and metoprolol, stable to continue same.

## 2025-07-11 NOTE — ASSESSMENT & PLAN NOTE
A1c is only 6.7 as of his 7/25 OV.  He is just on low to moderate dose metformin, so certainly appropriate to continue same.

## 2025-07-11 NOTE — PROGRESS NOTES
"Chief Complaint  Hyperlipidemia, Follow-up (Pt had labs, he states that this is routine. ), Fatigue (Pt states that he is very tired, he is shortness of breath at times, but he isn't active at all, he has slight vertigo.  He doesn't know if this is from the COVID vaccine.), Tingling in legs (He was on Gabapentin years ago for this. ), and Medicare Wellness-subsequent    Subjective          Marcio Enrique presents to [facility]     History of Present Illness:  75-year-old male with underlying hypertension, hyperlipidemia, diabetes, who is coming in 7/25 for routine 4-month follow-up.  We will go over his meds, review his labs in detail, and address any new concerns he may have. (We were weaning his trazodone off as of 7/25, and we will look at weaning sertraline off on return to office.)    Review of Systems   Constitutional:  Negative for appetite change, fatigue and fever.   HENT:  Negative for congestion and ear pain.    Eyes:  Negative for blurred vision.   Respiratory:  Negative for cough, chest tightness, shortness of breath and wheezing.    Cardiovascular:  Negative for chest pain, palpitations and leg swelling.   Gastrointestinal:  Negative for abdominal pain.   Genitourinary:  Negative for difficulty urinating, dysuria and hematuria.   Musculoskeletal:  Negative for arthralgias and gait problem.   Skin:  Negative for skin lesions.   Neurological:  Negative for syncope, memory problem and confusion.   Psychiatric/Behavioral:  Negative for self-injury and depressed mood.        Objective   Vital Signs:   /76   Pulse 58   Ht 198.1 cm (77.99\")   Wt 121 kg (266 lb 9.6 oz)   SpO2 98%   BMI 30.82 kg/m²           Physical Exam  Vitals and nursing note reviewed.   Constitutional:       General: He is not in acute distress.     Appearance: Normal appearance. He is not toxic-appearing.   HENT:      Head: Atraumatic.      Right Ear: External ear normal.      Left Ear: " External ear normal.      Nose: Nose normal.      Mouth/Throat:      Mouth: Mucous membranes are moist.   Eyes:      General:         Right eye: No discharge.         Left eye: No discharge.      Extraocular Movements: Extraocular movements intact.      Pupils: Pupils are equal, round, and reactive to light.   Cardiovascular:      Rate and Rhythm: Normal rate and regular rhythm.      Pulses: Normal pulses.      Heart sounds: Normal heart sounds. No murmur heard.     No gallop.   Pulmonary:      Effort: Pulmonary effort is normal. No respiratory distress.      Breath sounds: No wheezing, rhonchi or rales.   Abdominal:      General: There is no distension.      Palpations: Abdomen is soft. There is no mass.      Tenderness: There is no abdominal tenderness. There is no guarding.   Musculoskeletal:         General: No swelling or tenderness.      Cervical back: No tenderness.      Right lower leg: No edema.      Left lower leg: No edema.   Skin:     General: Skin is warm and dry.      Findings: No rash.   Neurological:      General: No focal deficit present.      Mental Status: He is alert and oriented to person, place, and time. Mental status is at baseline.      Motor: No weakness.      Gait: Gait normal.   Psychiatric:         Mood and Affect: Mood normal.         Thought Content: Thought content normal.          Result Review :   The following data was reviewed by: Aleksandr Olmstead MD on 08/23/2021:                  Assessment and Plan    Diagnoses and all orders for this visit:    1. Medicare annual wellness visit, subsequent (Primary)  Assessment & Plan:  AWV completed 7/25.  Remains very active and independent.  No falls, no hospitalizations.  Discussed with patient previously the need for living will/etc.             2. Essential hypertension  Overview:  Echo 3/25:    Left ventricular systolic function is normal. Left ventricular ejection fraction appears to be 56 - 60%.    Left ventricular wall thickness is  consistent with mild concentric hypertrophy.    Left ventricular diastolic function is consistent with (grade I) impaired relaxation.    The left atrial cavity is mildly dilated.    There is mild mitral regurgitation.    Moderate dilation of the ascending aorta is present, measuring 4 cm diameter.    Assessment & Plan:  Blood pressure remains well-controlled his pulse is right around 60 as of his 7/25 OV.  Patient is on full dose amlodipine/valsartan along with low doses of doxazosin and metoprolol, stable to continue same.    Orders:  -     Comprehensive metabolic panel; Future    3. Type 2 diabetes mellitus without complication, without long-term current use of insulin  Assessment & Plan:  A1c is only 6.7 as of his 7/25 OV.  He is just on low to moderate dose metformin, so certainly appropriate to continue same.    Orders:  -     Hemoglobin A1c; Future    4. Stage 3a chronic kidney disease  Assessment & Plan:  This is still very mild, his GFR is 58 as of 7/25 OV.  His electrolytes are fine and there is no acidosis.    He does require daily full dose meloxicam, so this will need to be addressed if his kidney function trends down consistently on's.      5. Iron deficiency anemia secondary to inadequate dietary iron intake  Overview:  Cologuard negative 8/22.    Assessment & Plan:  His hemoglobin is stable at 12.6 as of his 7/25 OV.  He is on 3 times weekly over-the-counter iron, his iron studies are normal, stable to continue same.    Orders:  -     CBC & Differential; Future  -     Iron Profile w/o Ferritin; Future  -     Ferritin; Future    6. Mixed hyperlipidemia  Overview:  CT calcium score 6/24:  Left main (LM): 0  Left anterior descending (LAD): 1  Left circumflex (CX): 12  Right coronary artery (RCA): 0  Total 13    Orders:  -     Lipid panel; Future    7. Other fatigue  Assessment & Plan:  This is lingering on as of 7/25.    As noted his thyroid function, B12, CBC, CT calcium score, PFTs etc. were all pretty  unremarkable.    His issue is more when he tries to be active, but we pretty much rule that out.  He did have a stress test itself couple years ago as well which was normal.    Recommend we get a sleep study for further evaluation as of his 10/24 OV.    ---> As noted above, still an issue as of 7/25.  He was evaluated by the sleep lab and significant sleep apnea was ruled out.    His labs remained stable presently, we just repeated his thyroid function, his hemoglobin is holding at 12.5, he has been at this level for some time.    His symptoms are not just motivational/at rest, he also gets or out when trying to do significant physical activity, so since has been for 5 years since he has had a stress test and he has numerous cardiac risk factors, we will put in for a repeat 1 at this time.    Orders:  -     Vitamin B12; Future  -     Folate; Future    8. Acquired dilation of ascending aorta and aortic root  Overview:  Chest CT 3/25:  The aorta measures 3.8 cm in diameter at the level of the sinuses of Valsalva. The sinotubular junction measures 3 cm in diameter. The ascending aorta measures 4.3 cm in diameter at the level of the main pulmonary artery bifurcation. The main pulmonary   artery measures 3.5 cm in maximal diameter    ECHO 3/25:      Left ventricular systolic function is normal. Left ventricular ejection fraction appears to be 56 - 60%.    Left ventricular wall thickness is consistent with mild concentric hypertrophy.    Left ventricular diastolic function is consistent with (grade I) impaired relaxation.    The left atrial cavity is mildly dilated.    There is mild mitral regurgitation.    Moderate dilation of the ascending aorta is present, measuring      4 cm diameter.    ECHO 3/18:  ASCENDING AORTIC DILATATION=3.8 cm (d/w nl 3.5 or less, dilated 3.5 to 4.5, aneurysmal is above 4.4, and surgery above 5.5).    Assessment & Plan:  The chest CT from 3/25 is as noted above and is unchanged from 3/18.   Discussed with patient we do not need to monitor this annually, every several years would be fine.      9. Dyspnea on exertion  Overview:  PFTs 4/22:    Spirometry:  No obstructive lung defect noted.  FEV1 is 3.86 L / 101% predicted.  FVC is 4.88 L / 97% predicted.  No bronchodilator response was noted.  Flow volume loop within normal limits.     Lung volumes:  No evidence of restriction, hyperinflation or air trapping.     Diffusion capacity:  Diffuse capacity with normal limits at 82% predicted.     Overall impression:  No evidence of obstruction or restriction.  No bronchodilator response was noted.  Diffuse capacity within normal limits.    Orders:  -     Stress Test With Myocardial Perfusion One Day; Future    10. Primary insomnia  Assessment & Plan:  No issues this regards as of his 7/25 OV, he is sleeping well, he wonders if he still requires the trazodone, so certainly we need to try to wean this off.    He is on 100 mg, recommend patient cut the dose in half for a week or 2, then try taking it every other night for a week or so, and then discontinue.                BMI is >= 30 and <35. (Class 1 Obesity). The following options were offered after discussion;: exercise counseling/recommendations and nutrition counseling/recommendations     --  --  Older notes:  VISIT 4/21:   ANNUAL MEDICARE WELLNESS EXAM 12/20 = reviewed all forms with pt; no new discoveries.  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  --  DM stable on only 500 qd and that is fine...6.3...6.9 and I d/w bad trend...6.9 is holding on 500 qd only... 7.4 and rec 1000 qd again...7.0...7.4 is due to the steroids, and  they are being weaned, so no med changes made 9/19...6.9 is back down off steroids...7.5 and will increase on RTO if same/higher = only on 500 qd recall...6.9 on 1000 qAM as of 8/20 OV---> 7.1 is fine 4/21. (TSH neg 4/21).  --  LIPIDS stable 3/14 w/o treatment...elevated CK without statins anyhow...defer... holding steady...122 and will add statin now...78, but CK up and c/o joints at 5/18 OV, so will hold Pravastatin 10 mg for now...86 is fine...96=ok for now---> 84 in 4/20.  --  HTN with need for checks at home before increase Exforge to max dose as of 2/18 OV... BP stable off HCTZ and on toprol...required increase exforge per Karyn; BP great 4/19 OV despite back on Naproxen past week---> stable 4/21.  --  SYNCOPE 2/18 and to ER for 5 hrs; had neg V/Q; did have aura, so most c/w vasovagal; exam neg; will get ECHO/Dopplers/tilt to complete w/u...ECHO is as below, the Carotids 3/18 were neg, and TILT + for NEUROCARDIOGENIC SYNCOPE=will try to lose HCTZ and add in low dose of toprol; pt to monitor BP and call with results; d/w keep hydrated/heed warning signs...c/o dizziness with rapid head mvmt and rec eval per ENT now...got better on it's own.  ASCENDING AORTIC DILATATION=3.8 cm per ECHO 3/18 (d/w nl 3.5 or less, dilated 3.5 to 4.5, aneurysmal is above 4.4, and surgery above 5.5).  --  DJD with L knee/shoulder requiring MRI's...increased sxs 5/18, so holding statin...c/o knee again 12/18, but doesn't sound severe; OTC meds for now...I was unaware of bump in BP, so I started Naprosyn back a week ago...on prednisone as of 6/19 OV with Dr Mcgovern following now...ESR down 86 to 9 on prednisone as of 9/19 OV...still being weaned as of 12/19 OV and I d/w add tylenol to help offset the prednisone wean.  --  H PYLORI +...d/w tx plan with prilosec 20 mg/amoxil 1000mg/biaxin 500 mg all bid for 14 days...tolerated this.  GERD/BURPPING was better when he was on PPI for above, so try qod...no c/o 10/17.  GALLSTONES noted  per below eval and will eval if persistent.  R ABD PAIN to ER, was nauseated, CT=? liver cysts and L renal lesion, he's concerned, so will get MRI 2/16... MRI 3/17 is with stable cysts.   --  BPH with intol to cardura prior, slow stream but no other sx's...defer for now.   --  --  PSA 2.8 (3/26/2024)  COLON 8/22 = Cologuard negative  PNEUMOVAX #1 12/13, Prevnar '16; rec Hep A 5/18.  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    Follow Up   Return in about 4 months (around 11/11/2025).  Patient was given instructions and counseling regarding his condition or for health maintenance advice. Please see specific information pulled into the AVS if appropriate.

## 2025-07-11 NOTE — ASSESSMENT & PLAN NOTE
The chest CT from 3/25 is as noted above and is unchanged from 3/18.  Discussed with patient we do not need to monitor this annually, every several years would be fine.

## 2025-07-11 NOTE — PATIENT INSTRUCTIONS
1.  You will get a call from the cardiovascular lab in regards to scheduling the stress test, as noted it will be chemical stress test, you will not have to get on the treadmill.    2.  Cut the trazodone in half, take this every evening for the next 2 weeks or so, then try taking it every other day for another week or so, and then discontinue.    3.  After you get off of the trazodone, and are sleeping well, give us a call, and I can instruct you on how to back off on the sertraline.

## 2025-07-11 NOTE — ASSESSMENT & PLAN NOTE
No issues this regards as of his 7/25 OV, he is sleeping well, he wonders if he still requires the trazodone, so certainly we need to try to wean this off.    He is on 100 mg, recommend patient cut the dose in half for a week or 2, then try taking it every other night for a week or so, and then discontinue.

## 2025-07-18 NOTE — PROGRESS NOTES
Chief Complaint: No chief complaint on file.    Subjective         History of Present Illness  Marcio Enrique is a 75 y.o. male presents to [facility] UROLOGY to be seen for follow-up.    Patient was previously seen by me with last visit on 1/22/2025 for erectile dysfunction.  He was started on tadalafil at that visit.  He is here for follow-up.    History of Present Illness  The patient is a 75-year-old male who presents for follow-up of erectile dysfunction.    He reports no current issues but notes that the tadalafil prescribed during his last visit has not been effective. He initially took one tablet, then increased the dosage to two tablets, but still did not observe any improvement. He has previously tried sildenafil (Viagra), which also proved ineffective.     He expresses a desire to discontinue all treatments and is not interested in Trimix injections. However, he is open to receiving information about penile implants.    Previous 1/22/2025:  Patient presents reporting he has been having difficulty with erections. He reports his wife passed away 4-5 years ago and was sick for 10 years prior to that so was having trouble for this.      He was treated with sildenafil but this was not effective. He was able to get some erection but not enough for intercourse.      Frequency-denies   Urgency-sometimes   Incontinence-denies   Nocturia-rare   Stream-not strong   GH-denies   History of stones-denies    surgeries-denies   Family history of  malignancy-brother - prostate CA at age 80   Cardiopulmonary-denies   Anticoagulants-denies   Smoker-denies     PSA  3/26/2024 2.8      Objective     Past Medical History:   Diagnosis Date    Acid reflux     Anesthesia     FAMILY AND Pt REPORT Pt WAS COMBATIVE AFTER DENTAL PROCEDURE    Dizziness     DJD (degenerative joint disease)     LEFT SHOULDER; LEFT KNEE= MRI    Essential (primary) hypertension     Gastro-esophageal reflux disease without esophagitis      High cholesterol     HTN (hypertension)     Iron deficiency anemia, unspecified     Left knee pain     Mixed hyperlipidemia     Neuropathy     Other fatigue     Partial tear of left rotator cuff     PUD (peptic ulcer disease)     Pure hypercholesterolemia     INCREASED CK WITH ALL    SOB (shortness of breath) 2021    HAD ECHO/ STRESS NO PROBLEMS SINCE THEN. NO CARDIOLOGIST- DENIED CP/SOB    Type 2 diabetes mellitus without complication     ON MEDS- DOESN'T CHECK BG DAILY AT HOME    Unspecified osteoarthritis, unspecified site        Past Surgical History:   Procedure Laterality Date    CIRCUMCISION      KNEE ACL RECONSTRUCTION      left    KNEE SURGERY Left     CLEAN     TOOTH EXTRACTION      TOTAL KNEE ARTHROPLASTY Left 10/24/2023    Procedure: LEFT TOTAL KNEE ARTHROPLASTY WITH DAVID ROBOT;  Surgeon: Henry, Gurinder CAMPBELL MD;  Location: [facility] MAIN OR;  Service: Robotics - Ortho;  Laterality: Left;         Current Outpatient Medications:     amLODIPine-valsartan (EXFORGE)  MG per tablet, TAKE 1 TABLET BY MOUTH EVERY DAY, Disp: 90 tablet, Rfl: 3    Blood Glucose Calibration (Accu-Chek Guide Control) liquid, 1 each by In Vitro route Daily As Needed (machine needing controled)., Disp: 1 each, Rfl: 5    cetirizine (zyrTEC) 10 MG tablet, Take 1 tablet by mouth Daily., Disp: 90 tablet, Rfl: 1    doxazosin (CARDURA) 2 MG tablet, TAKE 1 TABLET BY MOUTH EVERY DAY AT NIGHT, Disp: 90 tablet, Rfl: 1    ferrous sulfate 325 (65 FE) MG tablet, Take 1 tablet by mouth 3 (Three) Times a Week., Disp: , Rfl:     meloxicam (MOBIC) 15 MG tablet, TAKE 1 TABLET BY MOUTH EVERY DAY, Disp: 90 tablet, Rfl: 3    metFORMIN ER (GLUCOPHAGE-XR) 500 MG 24 hr tablet, TAKE 2 TABLETS BY MOUTH DAILY WITH BREAKFAST., Disp: 180 tablet, Rfl: 1    metoprolol succinate XL (TOPROL-XL) 25 MG 24 hr tablet, Take 0.5 tablets by mouth Daily., Disp: 45 tablet, Rfl: 1    multivitamin with minerals tablet tablet, Take 1 tablet by mouth Daily., Disp: , Rfl:      omeprazole (priLOSEC) 40 MG capsule, TAKE 1 CAPSULE BY MOUTH TWICE A DAY, Disp: 180 capsule, Rfl: 1    ondansetron ODT (ZOFRAN-ODT) 4 MG disintegrating tablet, Place 1 tablet on the tongue 4 (Four) Times a Day As Needed for Nausea or Vomiting., Disp: 20 tablet, Rfl: 0    pravastatin (PRAVACHOL) 20 MG tablet, TAKE 1 TABLET BY MOUTH EVERY DAY, Disp: 90 tablet, Rfl: 3    sertraline (ZOLOFT) 50 MG tablet, TAKE 1 TABLET BY MOUTH EVERY DAY, Disp: 90 tablet, Rfl: 1    tadalafil (CIALIS) 10 MG tablet, Take 1 tablet by mouth Daily As Needed for Erectile Dysfunction. May take up to 2 tablets (20 mg max), Disp: 30 tablet, Rfl: 3    traZODone (DESYREL) 50 MG tablet, TAKE 2 TABLETS BY MOUTH EVERY NIGHT, Disp: 180 tablet, Rfl: 1    triamcinolone (KENALOG) 0.5 % cream, Apply 1 Application topically to the appropriate area as directed 3 (Three) Times a Day., Disp: 30 g, Rfl: 1    No Known Allergies     Family History   Problem Relation Age of Onset    Colon cancer Other         X2 HE BELIEVES    Malig Hyperthermia Neg Hx        Social History     Socioeconomic History    Marital status:    Tobacco Use    Smoking status: Never     Passive exposure: Never    Smokeless tobacco: Never   Vaping Use    Vaping status: Never Used   Substance and Sexual Activity    Alcohol use: No    Drug use: No    Sexual activity: Defer       Vital Signs:   There were no vitals taken for this visit.     Physical Exam  Vitals reviewed.   Constitutional:       Appearance: Normal appearance.   Neurological:      General: No focal deficit present.      Mental Status: He is alert and oriented to person, place, and time.   Psychiatric:         Mood and Affect: Mood normal.         Behavior: Behavior normal.          Result Review :   The following data was reviewed by: NORA Mcbride on 07/22/2025:         Results        Procedures        Assessment and Plan    There are no diagnoses linked to this encounter.    Assessment & Plan  1.  Erectile dysfunction.  Tadalafil 10 mg has not been effective, even after increasing the dose to 20 mg. Advised against further increasing the dosage beyond 20 mg. Discussed the potential risks and benefits of Trimix injections, but this option was declined. The possibility of a penile implant was discussed, and he expressed interest in receiving more information.       Information about penile implants was provided, and the medical assistant will check his insurance coverage for the procedure. If covered, he will be contacted to schedule an appointment with Dr. Beck for further discussion. Potential side effects of the penile implant surgery include infection and other surgical risks, which will be further detailed by Dr. Beck.      Follow Up   No follow-ups on file.  Patient was given instructions and counseling regarding his condition or for health maintenance advice. Please see specific information pulled into the AVS if appropriate.         This document has been electronically signed by NORA Mcbride  July 18, 2025 11:49 EDT     Patient or patient representative verbalized consent for the use of Ambient Listening during the visit with  NORA Mcbride for chart documentation. 7/22/2025  10:59 EDT

## 2025-07-22 ENCOUNTER — RESULTS FOLLOW-UP (OUTPATIENT)
Age: 75
End: 2025-07-22
Payer: MEDICARE

## 2025-07-22 ENCOUNTER — HOSPITAL ENCOUNTER (OUTPATIENT)
Facility: HOSPITAL | Age: 75
Discharge: HOME | End: 2025-07-22
Admitting: INTERNAL MEDICINE
Payer: MEDICARE

## 2025-07-22 ENCOUNTER — OFFICE VISIT (OUTPATIENT)
Dept: UROLOGY | Age: 75
End: 2025-07-22
Payer: MEDICARE

## 2025-07-22 VITALS — RESPIRATION RATE: 14 BRPM | HEIGHT: 78 IN | BODY MASS INDEX: 30.86 KG/M2 | WEIGHT: 266.76 LBS

## 2025-07-22 DIAGNOSIS — R06.09 DYSPNEA ON EXERTION: ICD-10-CM

## 2025-07-22 DIAGNOSIS — N52.8 OTHER MALE ERECTILE DYSFUNCTION: Primary | ICD-10-CM

## 2025-07-22 LAB
BH CV IMMEDIATE POST RECOVERY TECH DATA SYMPTOMS: NORMAL
BH CV IMMEDIATE POST TECH DATA BLOOD PRESSURE: NORMAL MMHG
BH CV IMMEDIATE POST TECH DATA HEART RATE: 74 BPM
BH CV IMMEDIATE POST TECH DATA OXYGEN SATS: 98 %
BH CV REST NUCLEAR ISOTOPE DOSE: 10.6 MCI
BH CV SIX MINUTE RECOVERY TECH DATA BLOOD PRESSURE: NORMAL
BH CV SIX MINUTE RECOVERY TECH DATA HEART RATE: 69 BPM
BH CV SIX MINUTE RECOVERY TECH DATA OXYGEN SATURATION: 96 %
BH CV SIX MINUTE RECOVERY TECH DATA SYMPTOMS: NORMAL
BH CV STRESS BP STAGE 1: NORMAL
BH CV STRESS COMMENTS STAGE 1: NORMAL
BH CV STRESS DOSE REGADENOSON STAGE 1: 0.4
BH CV STRESS DURATION MIN STAGE 1: 0
BH CV STRESS DURATION SEC STAGE 1: 10
BH CV STRESS HR STAGE 1: 63
BH CV STRESS NUCLEAR ISOTOPE DOSE: 35.7 MCI
BH CV STRESS O2 STAGE 1: 98
BH CV STRESS PROTOCOL 1: NORMAL
BH CV STRESS RECOVERY BP: NORMAL MMHG
BH CV STRESS RECOVERY HR: 69 BPM
BH CV STRESS RECOVERY O2: 96 %
BH CV STRESS STAGE 1: 1
BH CV THREE MINUTE POST TECH DATA BLOOD PRESSURE: NORMAL MMHG
BH CV THREE MINUTE POST TECH DATA HEART RATE: 71 BPM
BH CV THREE MINUTE POST TECH DATA OXYGEN SATURATION: 98 %
BH CV THREE MINUTE RECOVERY TECH DATA SYMPTOM: NORMAL
MAXIMAL PREDICTED HEART RATE: 143 BPM
PERCENT MAX PREDICTED HR: 53.85 %
SPECT HRT GATED+EF W RNC IV: 50 %
STRESS BASELINE BP: NORMAL MMHG
STRESS BASELINE HR: 54 BPM
STRESS O2 SAT REST: 98 %
STRESS PERCENT HR: 63 %
STRESS POST O2 SAT PEAK: 98 %
STRESS POST PEAK BP: NORMAL MMHG
STRESS POST PEAK HR: 77 BPM
STRESS TARGET HR: 122 BPM

## 2025-07-22 PROCEDURE — 93016 CV STRESS TEST SUPVJ ONLY: CPT

## 2025-07-22 PROCEDURE — 93017 CV STRESS TEST TRACING ONLY: CPT

## 2025-07-22 PROCEDURE — 25010000002 REGADENOSON 0.4 MG/5ML SOLUTION: Performed by: INTERNAL MEDICINE

## 2025-07-22 PROCEDURE — 78452 HT MUSCLE IMAGE SPECT MULT: CPT | Performed by: INTERNAL MEDICINE

## 2025-07-22 PROCEDURE — 34310000005 TECHNETIUM TETROFOSMIN KIT: Performed by: INTERNAL MEDICINE

## 2025-07-22 PROCEDURE — 78452 HT MUSCLE IMAGE SPECT MULT: CPT

## 2025-07-22 PROCEDURE — A9502 TC99M TETROFOSMIN: HCPCS | Performed by: INTERNAL MEDICINE

## 2025-07-22 PROCEDURE — 93018 CV STRESS TEST I&R ONLY: CPT | Performed by: INTERNAL MEDICINE

## 2025-07-22 RX ORDER — REGADENOSON 0.08 MG/ML
0.4 INJECTION, SOLUTION INTRAVENOUS
Status: COMPLETED | OUTPATIENT
Start: 2025-07-22 | End: 2025-07-22

## 2025-07-22 RX ADMIN — TETROFOSMIN 1 DOSE: 1.38 INJECTION, POWDER, LYOPHILIZED, FOR SOLUTION INTRAVENOUS at 08:35

## 2025-07-22 RX ADMIN — TETROFOSMIN 1 DOSE: 1.38 INJECTION, POWDER, LYOPHILIZED, FOR SOLUTION INTRAVENOUS at 09:30

## 2025-07-22 RX ADMIN — REGADENOSON 0.4 MG: 0.08 INJECTION, SOLUTION INTRAVENOUS at 09:30

## 2025-07-28 RX ORDER — DOXAZOSIN 2 MG/1
2 TABLET ORAL
Qty: 90 TABLET | Refills: 1 | Status: SHIPPED | OUTPATIENT
Start: 2025-07-28

## 2025-07-28 RX ORDER — METFORMIN HYDROCHLORIDE 500 MG/1
1000 TABLET, EXTENDED RELEASE ORAL
Qty: 180 TABLET | Refills: 1 | Status: SHIPPED | OUTPATIENT
Start: 2025-07-28

## 2025-08-05 RX ORDER — TRAZODONE HYDROCHLORIDE 50 MG/1
100 TABLET ORAL NIGHTLY
Qty: 180 TABLET | Refills: 1 | Status: SHIPPED | OUTPATIENT
Start: 2025-08-05

## 2025-08-27 ENCOUNTER — TELEPHONE (OUTPATIENT)
Age: 75
End: 2025-08-27
Payer: MEDICARE

## (undated) DEVICE — SUT VIC 0 CT1 36IN J946H

## (undated) DEVICE — 3 BONE CEMENT MIXER: Brand: MIXEVAC

## (undated) DEVICE — NDL HYPO ECLPS SFTY 18G 1 1/2IN

## (undated) DEVICE — PULLOVER TOGA, 2X LARGE: Brand: FLYTE, SURGICOOL

## (undated) DEVICE — INTENDED FOR TISSUE SEPARATION, AND OTHER PROCEDURES THAT REQUIRE A SHARP SURGICAL BLADE TO PUNCTURE OR CUT.: Brand: BARD-PARKER ® CARBON RIB-BACK BLADES

## (undated) DEVICE — STRYKER PERFORMANCE SERIES SAGITTAL BLADE: Brand: STRYKER PERFORMANCE SERIES

## (undated) DEVICE — SYR LUERLOK 30CC

## (undated) DEVICE — TOWEL,OR,DSP,ST,BLUE,STD,4/PK,20PK/CS: Brand: MEDLINE

## (undated) DEVICE — SHEET,DRAPE,70X85,STERILE: Brand: MEDLINE

## (undated) DEVICE — ENCORE® LATEX ORTHO SIZE 8, STERILE LATEX POWDER-FREE SURGICAL GLOVE: Brand: ENCORE

## (undated) DEVICE — PROXIMATE RH ROTATING HEAD SKIN STAPLERS (35 WIDE) CONTAINS 35 STAINLESS STEEL STAPLES: Brand: PROXIMATE

## (undated) DEVICE — DRP ROBOTIC ROSA BX/20

## (undated) DEVICE — GAUZE,SPONGE,4"X4",16PLY,STRL,LF,10/TRAY: Brand: MEDLINE

## (undated) DEVICE — SOL IRR NACL 0.9PCT 3000ML

## (undated) DEVICE — GLV SURG ULTRAFREE MAX LTX PF 8

## (undated) DEVICE — GLV SURG SENSICARE PI PF LF 7 GRN STRL

## (undated) DEVICE — DRSNG SURESITE WNDW 4X4.5

## (undated) DEVICE — Device: Brand: PULSAVAC®

## (undated) DEVICE — DRP SURG U/DRP INVISISHIELD PA 48X52IN

## (undated) DEVICE — UNDYED BRAIDED (POLYGLACTIN 910), SYNTHETIC ABSORBABLE SUTURE: Brand: COATED VICRYL

## (undated) DEVICE — PEEL-AWAY TOGA, 2X LARGE: Brand: FLYTE

## (undated) DEVICE — ELECTRD BLD EDGE COAT 3IN

## (undated) DEVICE — MAT FLR ABS W/BLU/LINER 56X72IN WHT

## (undated) DEVICE — CVR LEG BOOTLEG F/R NOSKID 33IN

## (undated) DEVICE — APPL CHLORAPREP HI/LITE 26ML ORNG

## (undated) DEVICE — DISPOSABLE TOURNIQUET CUFF 30"X4", 1-LINE, WHITE, STERILE, 1EA/PK, 10PK/CS: Brand: ASP MEDICAL

## (undated) DEVICE — DRSNG PAD ABD 8X10IN STRL

## (undated) DEVICE — DRSNG GZ PETROLTM XEROFORM CURAD 1X8IN STRL

## (undated) DEVICE — BASIC SINGLE BASIN-LF: Brand: MEDLINE INDUSTRIES, INC.

## (undated) DEVICE — NO-SCRATCH ™ SMALL WHITNEY CURETTE ™ IS A SINGLE-USE, PLASTIC CURETTE FOR QUICKLY APPLYING, MANIPULATING AND REMOVING BONE CEMENT DURING HIP AND KNEE REPLACEMENT SURGERY. THE PLASTIC IS SOFTER THAN STEEL INSTRUMENTS, REDUCING THE RISK OF DAMAGING THE PROSTHESIS WITH METAL INSTRUMENTS.  THE CURETTE’S 6MM TIP REMOVES EXCESS CEMENT FROM REPLACEMENT HIPS AND KNEES. EASY-TO-MANEUVER, THE SMALL BLUE CURETTE LETS YOU REMOVE CEMENT FROM ALL EDGES OF THE PROSTHESIS.NO-SCRATCH WHITNEY SMALL CURETTE FEATURES:SAFER THAN STEEL- MADE OF PLASTIC - STURDY YET SOFTER THAN SURGICAL STEEL.HANDIER- EACH TOOL HAS A MOLDED-IN THUMB INDENTATION INSTANTLY ORIENTING THE TOOL.- EASIER TO MANEUVER IN HARD TO SEE PLACES.- COLOR-CODED FOR EASY IDENTIFICATION.FASTER- COMES INDIVIDUALLY PACKAGED IN STERILE, PEEL OPEN POUCH, READY TO GO.- APPLIES, MANIPULATES, OR REMOVES CEMENT WITH FINGERTIP PRECISION.ECONOMICAL- THE COST OF A SINGLE REVISION DWARFS THE COST OF A SINGLE-USE CURETTE. - DISPOSABLE – THERE’S NO NEED TO WASTE TIME REMOVING HARDENED CEMENT OR RE-STERILIZING TOOLS.- LESS EXPENSIVE TO BUY AND INVENTORY - ORDER ONLY THE TOOL YOU USE.- PACKAGED 25 INDIVIDUALLY WRAPPED TOOLS TO A CARTON FOR CONVENIENT SHELF STORAGE.: Brand: WHITNEY NO-SCRATCH CURETTE (SMALL)

## (undated) DEVICE — GLV SURG SENSICARE SLT PF LF 7 STRL

## (undated) DEVICE — SLV SCD KN/LEN ADJ EXPRSS BLENDED MD 1P/U

## (undated) DEVICE — TOTAL KNEE-LF: Brand: MEDLINE INDUSTRIES, INC.

## (undated) DEVICE — PENCL E/S SMOKEEVAC W/TELESCP CANN

## (undated) DEVICE — SUT ETHIB 1 X538H ETX538H